# Patient Record
Sex: MALE | Race: WHITE | NOT HISPANIC OR LATINO | Employment: OTHER | URBAN - METROPOLITAN AREA
[De-identification: names, ages, dates, MRNs, and addresses within clinical notes are randomized per-mention and may not be internally consistent; named-entity substitution may affect disease eponyms.]

---

## 2020-03-03 ENCOUNTER — TELEPHONE (OUTPATIENT)
Dept: SCHEDULING | Facility: IMAGING CENTER | Age: 65
End: 2020-03-03

## 2020-06-01 ENCOUNTER — OFFICE VISIT (OUTPATIENT)
Dept: MEDICAL GROUP | Facility: PHYSICIAN GROUP | Age: 65
End: 2020-06-01
Payer: COMMERCIAL

## 2020-06-01 VITALS
HEART RATE: 72 BPM | TEMPERATURE: 97.9 F | WEIGHT: 172.4 LBS | RESPIRATION RATE: 16 BRPM | SYSTOLIC BLOOD PRESSURE: 124 MMHG | DIASTOLIC BLOOD PRESSURE: 80 MMHG | BODY MASS INDEX: 24.14 KG/M2 | HEIGHT: 71 IN | OXYGEN SATURATION: 95 %

## 2020-06-01 DIAGNOSIS — E78.2 MIXED HYPERLIPIDEMIA: ICD-10-CM

## 2020-06-01 DIAGNOSIS — E10.9 TYPE 1 DIABETES MELLITUS WITHOUT COMPLICATION (HCC): ICD-10-CM

## 2020-06-01 DIAGNOSIS — I10 ESSENTIAL HYPERTENSION: ICD-10-CM

## 2020-06-01 DIAGNOSIS — N52.8 OTHER MALE ERECTILE DYSFUNCTION: ICD-10-CM

## 2020-06-01 PROCEDURE — 99204 OFFICE O/P NEW MOD 45 MIN: CPT | Performed by: FAMILY MEDICINE

## 2020-06-01 RX ORDER — FLASH GLUCOSE SENSOR
KIT MISCELLANEOUS
COMMUNITY
End: 2021-05-06 | Stop reason: SDUPTHER

## 2020-06-01 RX ORDER — ASPIRIN 81 MG/1
81 TABLET, CHEWABLE ORAL DAILY
COMMUNITY

## 2020-06-01 RX ORDER — AMLODIPINE BESYLATE 5 MG/1
TABLET ORAL
COMMUNITY
Start: 2020-05-13 | End: 2021-05-06 | Stop reason: SDUPTHER

## 2020-06-01 RX ORDER — INSULIN GLARGINE 100 [IU]/ML
INJECTION, SOLUTION SUBCUTANEOUS
COMMUNITY
Start: 2020-05-13 | End: 2021-05-04 | Stop reason: SDUPTHER

## 2020-06-01 RX ORDER — LORATADINE 10 MG/1
10 TABLET ORAL DAILY
COMMUNITY
End: 2023-07-24

## 2020-06-01 RX ORDER — INSULIN LISPRO 100 [IU]/ML
INJECTION, SOLUTION INTRAVENOUS; SUBCUTANEOUS
Qty: 5 PEN | Refills: 11 | Status: SHIPPED | OUTPATIENT
Start: 2020-06-01 | End: 2020-06-01 | Stop reason: SDUPTHER

## 2020-06-01 RX ORDER — TADALAFIL 20 MG/1
20 TABLET ORAL PRN
Qty: 30 TAB | Refills: 3 | Status: SHIPPED | OUTPATIENT
Start: 2020-06-01 | End: 2020-12-01

## 2020-06-01 RX ORDER — RANITIDINE 150 MG/1
150 TABLET ORAL 2 TIMES DAILY
COMMUNITY
End: 2021-05-04

## 2020-06-01 RX ORDER — INSULIN LISPRO 100 [IU]/ML
INJECTION, SOLUTION INTRAVENOUS; SUBCUTANEOUS
Qty: 15 PEN | Refills: 0 | Status: SHIPPED | OUTPATIENT
Start: 2020-06-01 | End: 2021-04-29 | Stop reason: SDUPTHER

## 2020-06-01 RX ORDER — ATORVASTATIN CALCIUM 10 MG/1
TABLET, FILM COATED ORAL
COMMUNITY
Start: 2020-04-24 | End: 2020-12-01 | Stop reason: SDUPTHER

## 2020-06-01 ASSESSMENT — PATIENT HEALTH QUESTIONNAIRE - PHQ9: CLINICAL INTERPRETATION OF PHQ2 SCORE: 0

## 2020-06-01 NOTE — PROGRESS NOTES
Subjective:     CC:  Diagnoses of Type 1 diabetes mellitus without complication (HCC), Mixed hyperlipidemia, Essential hypertension, and Other male erectile dysfunction were pertinent to this visit.    HISTORY OF THE PRESENT ILLNESS: Patient is a 65 y.o. male. This pleasant patient is here today to establish care and discuss the following problems.   Prior PCP: PCP in [place]     Type 1 diabetes mellitus without complication (HCC)  This is a chronic condition  The patient was diagnosed with this 43 years ago   He is on insulin basaglar 47U qAM  Also on Humalog 10U before breakfast and 55U before dinner.   Last A1c was 7/16/19 at 6.3  Cannot tell if he is hypoglycemic   Has not seen endocrinology    Mixed hyperlipidemia  This is a chronic issue.  The patient has a hx of of this. He is on lipitor 10mg daily.     Essential hypertension  Stable. Monitoring BP at home. Currently taking amlodipine 5mg as directed. Also taking baby aspirin. Denies lightheadedness, vision changes, headache, palpitations or leg swelling.      Other male erectile dysfunction  This is a chronic issue  The patient has had ED on/off over the past year.   Trouble initiating with this.   Requesting medicine today.       Allergies: Patient has no known allergies.    Current Outpatient Medications Ordered in Epic   Medication Sig Dispense Refill   • amLODIPine (NORVASC) 5 MG Tab      • atorvastatin (LIPITOR) 10 MG Tab      • INSULIN GLARGINE 100 UNIT/ML injection PEN      • insulin aspart (NOVOLOG) 100 UNIT/ML Solution Inject  as instructed 3 times a day before meals.     • Continuous Blood Gluc Sensor (FREESTYLE DON SENSOR SYSTEM) Misc by Does not apply route.     • raNITidine (ZANTAC) 150 MG Tab Take 150 mg by mouth 2 times a day.     • aspirin (ASPIRIN 81) 81 MG Chew Tab chewable tablet Take 81 mg by mouth every day.     • loratadine (CLARITIN) 10 MG Tab Take 10 mg by mouth every day.     • insulin lispro (HUMALOG) 100 UNIT/ML Solution  "Pen-injector injection PEN Continue with 10U qAM and 55U qPM 5 PEN 11   • tadalafil (CIALIS) 20 MG tablet Take 1 Tab by mouth as needed for Erectile Dysfunction. 30 Tab 3     No current Epic-ordered facility-administered medications on file.        Past Medical History:   Diagnosis Date   • HTN (hypertension)        History reviewed. No pertinent surgical history.    Social History     Tobacco Use   • Smoking status: Never Smoker   • Smokeless tobacco: Never Used   Substance Use Topics   • Alcohol use: Yes     Alcohol/week: 1.2 oz     Types: 2 Cans of beer per week   • Drug use: Never       Social History     Social History Narrative   • Not on file       Family History   Problem Relation Age of Onset   • No Known Problems Mother    • No Known Problems Father        Health Maintenance: Completed    ROS:   Gen: no fevers/chills, no changes in weight  Eyes: no changes in vision  ENT: no sore throat, no hearing loss, no bloody nose  Pulm: no sob, no cough  CV: no chest pain, no palpitations  GI: no nausea/vomiting, no diarrhea  : no dysuria  MSk: no myalgias  Skin: no rash  Neuro: no headaches, no numbness/tingling      Objective:     Exam: /80 (BP Location: Left arm, Patient Position: Sitting, BP Cuff Size: Adult)   Pulse 72   Temp 36.6 °C (97.9 °F) (Temporal)   Resp 16   Ht 1.803 m (5' 11\")   Wt 78.2 kg (172 lb 6.4 oz)   SpO2 95%  Body mass index is 24.04 kg/m².    General: Normal appearing. No distress.  HENT: Normocephalic. Ears normal shape and contour, canals are clear bilaterally, tympanic membranes are benign, nasal mucosa benign, oropharynx is without erythema, edema or exudates.   Eyes: Conjunctiva clear lids without ptosis, pupils equal and reactive to light accommodation.  Neck: Supple without JVD. Thyroid is not enlarged.  Pulmonary: Clear to ausculation.  Normal effort. No rales, ronchi, or wheezing.  Cardiovascular: Regular rate and rhythm without murmur. Radial pulses are intact and equal " bilaterally.  Abdomen: Soft, nontender, nondistended. Normal bowel sounds. Liver and spleen are not palpable  Neurologic: Moves all extremities  Lymph: No cervical or supraclavicular lymph nodes are palpable  Skin: Warm and dry.  No obvious lesions.  Musculoskeletal: Normal gait. No extremity cyanosis, clubbing, or edema.  Psych: Normal mood and affect. Alert and oriented x3. Judgment and insight is normal.    Assessment & Plan:   65 y.o. male with the following -    1. Type 1 diabetes mellitus without complication (HCC)  This is a chronic issue.  The patient has a history of type 1 diabetes currently on Humalog 10 units in the morning and 55 units at night as managed by previous PCP.  He is also taking Basaglar 47 units in the morning.  Most recent A1c was 6.3 back in July 2019.  I did offer him the opportunity to establish with our diabetic nurse and he will consider this in the near future.  Today he is requesting refills for his medications.  I would like to repeat his A1c today to see if there needs to be changes to his current regimen.  We will also refer to ophthalmology.  Blood pressure is within goal.  The patient is already on statin.  The patient is not on ACE inhibitor.  We will continue to revisit this in the future.  - insulin lispro (HUMALOG) 100 UNIT/ML Solution Pen-injector injection PEN; Continue with 10U qAM and 55U qPM  Dispense: 5 PEN; Refill: 11  - HEMOGLOBIN A1C; Future  - Lipid Profile; Future  - CBC WITHOUT DIFFERENTIAL; Future  - Basic Metabolic Panel; Future  - REFERRAL TO OPHTHALMOLOGY    2. Mixed hyperlipidemia  Is a chronic issue.  The patient has a history of hyperlipidemia currently on atorvastatin 10 mg daily.  The patient is due for labs today.    3. Essential hypertension  This is a chronic issue.  Patient has a history of hypertension currently on amlodipine 5 mg daily.  Blood pressure is within goal.    4. Other male erectile dysfunction  This is a chronic issue.  The patient has  been experiencing erectile dysfunction.  He is requesting a refill for Cialis today.  - tadalafil (CIALIS) 20 MG tablet; Take 1 Tab by mouth as needed for Erectile Dysfunction.  Dispense: 30 Tab; Refill: 3      Return in about 6 months (around 12/1/2020).    Please note that this dictation was created using voice recognition software. I have made every reasonable attempt to correct obvious errors, but I expect that there are errors of grammar and possibly content that I did not discover before finalizing the note.

## 2020-06-01 NOTE — ASSESSMENT & PLAN NOTE
This is a chronic issue  The patient has had ED on/off over the past year.   Trouble initiating with this.   Requesting medicine today.

## 2020-06-01 NOTE — ASSESSMENT & PLAN NOTE
Stable. Monitoring BP at home. Currently taking amlodipine 5mg as directed. Also taking baby aspirin. Denies lightheadedness, vision changes, headache, palpitations or leg swelling.

## 2020-06-01 NOTE — TELEPHONE ENCOUNTER
Received request via: Patient    Was the patient seen in the last year in this department? Yes    Does the patient have an active prescription (recently filled or refills available) for medication(s) requested? No     Patient needs 90 day supply for insurance

## 2020-06-01 NOTE — ASSESSMENT & PLAN NOTE
This is a chronic condition  The patient was diagnosed with this 43 years ago   He is on insulin basaglar 47U qAM  Also on Humalog 10U before breakfast and 55U before dinner.   Last A1c was 7/16/19 at 6.3  Cannot tell if he is hypoglycemic   Has not seen endocrinology

## 2020-06-09 LAB — HBA1C MFR BLD: 6.6 % (ref 0–5.6)

## 2020-09-15 ENCOUNTER — TELEPHONE (OUTPATIENT)
Dept: MEDICAL GROUP | Facility: PHYSICIAN GROUP | Age: 65
End: 2020-09-15

## 2020-09-15 DIAGNOSIS — M54.9 OTHER CHRONIC BACK PAIN: ICD-10-CM

## 2020-09-15 DIAGNOSIS — G89.29 OTHER CHRONIC BACK PAIN: ICD-10-CM

## 2020-09-15 NOTE — TELEPHONE ENCOUNTER
1. Caller Name: Sukumar Tyson                          Call Back Number: [phone] (home)     Patient is requesting a referral to a chiropractor Luis Fernando Voss @ [facility] for back pain.    Please advise

## 2020-09-22 ENCOUNTER — TELEPHONE (OUTPATIENT)
Dept: MEDICAL GROUP | Facility: PHYSICIAN GROUP | Age: 65
End: 2020-09-22

## 2020-09-22 NOTE — TELEPHONE ENCOUNTER
1. Caller Name: Sukumar Tyson                          Call Back Number: [phone] (home)     Patient is upset and want something done about his chiropractor referral. I explained to the patient when it was placed and today several times that it does take 5-7 business days. Patient was upset stating Dr. Awad needs to get faster on referrals I explained that this is a whole other department that handles this. I let patient know he is welcome to come in for an appointment. He does not want an appointment and believes the office is money hungry. Patient will call back in a couple days to get an update

## 2020-12-01 ENCOUNTER — TELEMEDICINE (OUTPATIENT)
Dept: MEDICAL GROUP | Facility: PHYSICIAN GROUP | Age: 65
End: 2020-12-01
Payer: COMMERCIAL

## 2020-12-01 VITALS — WEIGHT: 170 LBS | BODY MASS INDEX: 23.8 KG/M2 | TEMPERATURE: 97.5 F | HEIGHT: 71 IN

## 2020-12-01 DIAGNOSIS — I10 ESSENTIAL HYPERTENSION: ICD-10-CM

## 2020-12-01 DIAGNOSIS — E78.2 MIXED HYPERLIPIDEMIA: ICD-10-CM

## 2020-12-01 DIAGNOSIS — G89.29 CHRONIC LOW BACK PAIN WITHOUT SCIATICA, UNSPECIFIED BACK PAIN LATERALITY: ICD-10-CM

## 2020-12-01 DIAGNOSIS — E10.9 TYPE 1 DIABETES MELLITUS WITHOUT COMPLICATION (HCC): ICD-10-CM

## 2020-12-01 DIAGNOSIS — M54.50 CHRONIC LOW BACK PAIN WITHOUT SCIATICA, UNSPECIFIED BACK PAIN LATERALITY: ICD-10-CM

## 2020-12-01 PROCEDURE — 99214 OFFICE O/P EST MOD 30 MIN: CPT | Mod: 95,CR | Performed by: FAMILY MEDICINE

## 2020-12-01 RX ORDER — ATORVASTATIN CALCIUM 10 MG/1
10 TABLET, FILM COATED ORAL DAILY
Qty: 90 TAB | Refills: 3 | Status: SHIPPED | OUTPATIENT
Start: 2020-12-01 | End: 2021-11-29

## 2020-12-01 RX ORDER — SILDENAFIL 100 MG/1
100 TABLET, FILM COATED ORAL PRN
Qty: 10 TAB | Refills: 3 | Status: SHIPPED | OUTPATIENT
Start: 2020-12-01 | End: 2020-12-01

## 2020-12-01 RX ORDER — SILDENAFIL 100 MG/1
100 TABLET, FILM COATED ORAL PRN
Qty: 30 TAB | Refills: 3 | Status: SHIPPED | OUTPATIENT
Start: 2020-12-01 | End: 2022-01-20

## 2020-12-01 NOTE — PROGRESS NOTES
Virtual Visit: Established Patient   This visit was conducted via Zoom using secure and encrypted videoconferencing technology. The patient was in a private location in the state of [place].    The patient's identity was confirmed and verbal consent was obtained for this virtual visit.    Subjective:   CC:   Chief Complaint   Patient presents with   • Follow-Up       Sukumar Tyson is a 65 y.o. male presenting for evaluation and management of:    #DMT1  This is a chronic issue  Currently on Basaglar 47U qAM and Humalog 10U before breakfast and 55U before dinner  Last A1c was 6.6 in 6/20  FBG has been in the 120s  Not following up with endocrinology   Follows up with ophthalmology     #HTN  Stable. Monitoring BP at home. Currently taking amlodipine 5mg daily as directed. Also taking baby aspirin. Denies lightheadedness, vision changes, headache, palpitations or leg swelling.    #HLD  This is a chronic issue  The patient is on Lipitor 10mg daily  Last lipid panel 6/20 and all wnl.     #Low back pain  This is a chronic issue  Has seen a chiropractor   Requesting referral today    ROS   Denies any recent fevers or chills. No nausea or vomiting. No chest pains or shortness of breath.     No Known Allergies    Current medicines (including changes today)  Current Outpatient Medications   Medication Sig Dispense Refill   • atorvastatin (LIPITOR) 10 MG Tab Take 1 Tab by mouth every day. 90 Tab 3   • sildenafil citrate (VIAGRA) 100 MG tablet Take 1 Tab by mouth as needed for Erectile Dysfunction. 30 Tab 3   • glucagon 1 mg Recon Soln Infuse 1 mg into a venous catheter Once for 1 dose. 1 Each 0   • amLODIPine (NORVASC) 5 MG Tab      • INSULIN GLARGINE 100 UNIT/ML injection PEN      • insulin aspart (NOVOLOG) 100 UNIT/ML Solution Inject  as instructed 3 times a day before meals.     • Continuous Blood Gluc Sensor ([employer] DON SENSOR SYSTEM) Misc by Does not apply route.     • raNITidine (ZANTAC) 150 MG Tab Take 150 mg by  "mouth 2 times a day.     • aspirin (ASPIRIN 81) 81 MG Chew Tab chewable tablet Take 81 mg by mouth every day.     • loratadine (CLARITIN) 10 MG Tab Take 10 mg by mouth every day.     • insulin lispro (HUMALOG) 100 UNIT/ML Solution Pen-injector injection PEN Continue with 10U qAM and 55U qPM 15 PEN 0     No current facility-administered medications for this visit.        Patient Active Problem List    Diagnosis Date Noted   • Chronic low back pain without sciatica 12/01/2020   • Type 1 diabetes mellitus without complication (HCC) 06/01/2020   • Mixed hyperlipidemia 06/01/2020   • Essential hypertension 06/01/2020   • Other male erectile dysfunction 06/01/2020       Family History   Problem Relation Age of Onset   • No Known Problems Mother    • No Known Problems Father        He  has a past medical history of HTN (hypertension).  He  has no past surgical history on file.       Objective:   Temp 36.4 °C (97.5 °F) (Oral) Comment: pt reported  Ht 1.803 m (5' 11\") Comment: pt reported  Wt 77.1 kg (170 lb) Comment: pt reported  BMI 23.71 kg/m²     Physical Exam:  Constitutional: Alert, no distress, well-groomed.  Skin: No rashes in visible areas.  Eye: Round. Conjunctiva clear, lids normal. No icterus.   ENMT: Lips pink without lesions, good dentition, moist mucous membranes. Phonation normal.  Neck: No masses, no thyromegaly. Moves freely without pain.  Respiratory: Unlabored respiratory effort, no cough or audible wheeze  Psych: Alert and oriented x3, normal affect and mood.       Assessment and Plan:   The following treatment plan was discussed:     1. Type 1 diabetes mellitus without complication (HCC)  Last A1c - 6.6 6/20. Due for repeat  Next A1c due - due in 1 month   Regimen - basaglar 47U qam, humalog 10U before breakfast and 55U before dinner   BP at goal <140/90  Last DM foot exam - w/ the last 12 months   Last ophthalmology visit - 7/20  Follow up in - 6 mo    - HEMOGLOBIN A1C; Future  - atorvastatin " (LIPITOR) 10 MG Tab; Take 1 Tab by mouth every day.  Dispense: 90 Tab; Refill: 3  - glucagon 1 mg Recon Soln; Infuse 1 mg into a venous catheter Once for 1 dose.  Dispense: 1 Each; Refill: 0    2. Essential hypertension  This is a chronic issue, stable. Continue with current regimen  - atorvastatin (LIPITOR) 10 MG Tab; Take 1 Tab by mouth every day.  Dispense: 90 Tab; Refill: 3    3. Mixed hyperlipidemia  This is a chronic issue. Due for repeat lipid panel 6/21.     4. Chronic low back pain without sciatica, unspecified back pain laterality  This is a new problem. Has had lumbar back pain w/o sciatica. Failed chiropractor therapy. Will refer to PT.   - REFERRAL TO PHYSICAL THERAPY    Other orders  - sildenafil citrate (VIAGRA) 100 MG tablet; Take 1 Tab by mouth as needed for Erectile Dysfunction.  Dispense: 30 Tab; Refill: 3        Follow-up: Return in about 6 months (around 6/1/2021).

## 2021-01-04 ENCOUNTER — PHYSICAL THERAPY (OUTPATIENT)
Dept: PHYSICAL THERAPY | Facility: REHABILITATION | Age: 66
End: 2021-01-04
Attending: FAMILY MEDICINE
Payer: COMMERCIAL

## 2021-01-04 DIAGNOSIS — M54.50 CHRONIC LOW BACK PAIN WITHOUT SCIATICA, UNSPECIFIED BACK PAIN LATERALITY: ICD-10-CM

## 2021-01-04 DIAGNOSIS — G89.29 CHRONIC LOW BACK PAIN WITHOUT SCIATICA, UNSPECIFIED BACK PAIN LATERALITY: ICD-10-CM

## 2021-01-04 PROCEDURE — 97162 PT EVAL MOD COMPLEX 30 MIN: CPT

## 2021-01-04 PROCEDURE — 97535 SELF CARE MNGMENT TRAINING: CPT

## 2021-01-04 PROCEDURE — 97110 THERAPEUTIC EXERCISES: CPT

## 2021-01-04 ASSESSMENT — ENCOUNTER SYMPTOMS
PAIN SCALE AT LOWEST: 3
PAIN SCALE AT HIGHEST: 8
PAIN SCALE: 3

## 2021-01-04 NOTE — OP THERAPY EVALUATION
Outpatient Physical Therapy  INITIAL EVALUATION    [facility]  [address], [address]  [place] [address]  Phone:  [phone]    Date of Evaluation: 2021    Patient: Sukumar Tyson  YOB: 1955  MRN: [id number]     Referring Provider: Soren Awad M.D.  [address]  [place] [address]   Referring Diagnosis Chronic low back pain without sciatica, unspecified back pain laterality [M54.5, G89.29]     Time Calculation                   Chief Complaint: No chief complaint on file.    Visit Diagnoses     ICD-10-CM   1. Chronic low back pain without sciatica, unspecified back pain laterality  M54.5    G89.29         Subjective:   History of Present Illness:     Mechanism of injury:  Lower left back pain that has been going on for years. Feels constant symptoms. Made worse with sitting and forward bending. Denies any specific injury. Walking helps reduce symptoms. Dressing every morning, to put on socks and shoes provokes low back symptoms. Denies radicular symptoms, but does recall a past episode of sciatica in 2020 where symptoms travelled down lateral left leg. Recalls, 7-8 pain upon standing after sitting for 10-15 minutes.     Occupation: Semi-retired   Hobbies: skiing, walking, stretches, golfing  Pain:     Current pain rating:  3    At best pain rating:  3    At worst pain ratin    Location:  L. lumbar     Progression:  Stable  Treatments:     Previous treatment:  Chiropractic    Treatment Comments:  Has had 4 chiro visits in past 6 weeks, don't feel like it's helping  Takes 2 ibuprofen and 2 tylenol before activity   Patient Goals:     Patient goals for therapy:  Decreased pain      Past Medical History:   Diagnosis Date   • HTN (hypertension)      No past surgical history on file.  Social History     Tobacco Use   • Smoking status: Never Smoker   • Smokeless tobacco: Never Used   Substance Use Topics   • Alcohol use: Yes      Alcohol/week: 1.2 oz     Types: 2 Cans of beer per week     Family and Occupational History     Socioeconomic History   • Marital status:      Spouse name: Not on file   • Number of children: Not on file   • Years of education: Not on file   • Highest education level: Not on file   Occupational History   • Not on file       Objective     Hip Screen   Hip range of motion within functional limits with the following exceptions: Pain with active and passive hip flex on L. In sitting but not supine   Stiff but not painful R. Hip IR  Hip strength within functional limits    Tenderness     Left Hip   Tenderness in the PSIS, sacroiliac joint and sacrum.      Active Range of Motion     Lumbar   Flexion: decreased  Extension: within functional limits  Left rotation: decreased  Right rotation: within functional limits    Additional Active Range of Motion Details  ROS LR and flexion     Passive Range of Motion     Lumbar   Flexion: within functional limits  Extension: decreased  Left rotation: decreased  Right rotation: decreased    Additional Passive Range of Motion Details  Pain PROM flexion and caudal R. And L. Rotation     Joint Play   Spine     Central PA [place]        L4: hypomobile with grade 2       L5: hypomobile with grade 2       S1: hypomobile with grade 2            Therapeutic Exercises (CPT 77151):     1. HS stretch supine w/ strap , 2 min B, HEP w/ picture    2. Piriformis stretch , 1 min , HEP w/ picture    3. Bridging , 1 x 15 x 5'', HEP w/ picture    4. Ball caudal rotation , 1 min , HEP for lumbar decompression when Symptomatic     Therapeutic Treatments and Modalities:     1. Mechanical Traction (CPT 60632), Lumbar, 75/35# and 60/20t w/ MHP     2. Functional Training, Self Care (CPT 10485), HEP and education on sitting posture     Time-based treatments/modalities:           Assessment, Response and Plan:   Impairments: lacks appropriate home exercise program, limited mobility and pain with function     Assessment details:  Patient would benefit from skilled physical therapy to address low back pain by working on strength and conditioning, AROM/PROM, manual therapy techniques, postural positioning, activity tolerance and functional activity   Barriers to therapy:  None  Prognosis: good    Goals:   Short Term Goals:   1) Indep with HEP  2) 50% less pain in forward trunk flexion while seated  Short term goal time span:  2-4 weeks      Long Term Goals:    1) Progression/regression advancing HEP  2) Able to don/doff socks shoes with 1-2 levels of less pain on VAS  Long term goal time span:  4-6 weeks    Plan:   Therapy options:  Physical therapy treatment to continue  Planned therapy interventions:  Therapeutic Exercise (CPT 81300), Therapeutic Activities (CPT 44110), Functional Training, Self Care (CPT 11250) and Neuromuscular Re-education (CPT 89976)  Frequency:  2x week  Duration in weeks:  6  Duration in visits:  12  Discussed with:  Patient      Functional Assessment Used        Referring provider co-signature:  I have reviewed this plan of care and my co-signature certifies the need for services.    Certification Period: 01/04/2021 to  03/01/21    Physician Signature: ________________________________ Date: ______________

## 2021-01-11 ENCOUNTER — PHYSICAL THERAPY (OUTPATIENT)
Dept: PHYSICAL THERAPY | Facility: REHABILITATION | Age: 66
End: 2021-01-11
Attending: FAMILY MEDICINE
Payer: COMMERCIAL

## 2021-01-11 DIAGNOSIS — G89.29 CHRONIC LOW BACK PAIN WITHOUT SCIATICA, UNSPECIFIED BACK PAIN LATERALITY: ICD-10-CM

## 2021-01-11 DIAGNOSIS — M54.50 CHRONIC LOW BACK PAIN WITHOUT SCIATICA, UNSPECIFIED BACK PAIN LATERALITY: ICD-10-CM

## 2021-01-11 PROCEDURE — 97110 THERAPEUTIC EXERCISES: CPT

## 2021-01-11 PROCEDURE — 97535 SELF CARE MNGMENT TRAINING: CPT

## 2021-01-11 NOTE — OP THERAPY DAILY TREATMENT
"  Outpatient Physical Therapy  DAILY TREATMENT     [facility] [place] [address] [facility], [address]  [place] [address]  Phone:  [phone]    Date: 01/11/2021    Patient: Sukumar Tyson  YOB: 1955  MRN: [id number]     Time Calculation    Start time: 0900  Stop time: 0930 Time Calculation (min): 30 minutes         Chief Complaint: Back Problem    Visit #: 2    SUBJECTIVE:  No changes in symptoms. The constant pain I feel in low back feels still present.     OBJECTIVE:  Current objective measures:     Standing lumbar flexion \"pain\" in posterior legs posterior not back  qped flexion, normal     Normal rotation mobility    PIVM: stiff for lumbar extension          Therapeutic Exercises (CPT 11787):     1. HS stretch supine w/ strap , 2 min B, HEP w/ picture    2. Piriformis stretch , 1 min , HEP w/ picture    3. Bridging , 1 x 15 x 5'', HEP w/ picture    4. Ball caudal rotation , HEP, HEP for lumbar decompression when Symptomatic     5. Ball wall squat, 1 x 20 , emphasis on neutral lumbar spine     6. ASLR , 2 x 12 B     Therapeutic Treatments and Modalities:     1. Mechanical Traction (CPT 06838), Lumbar, 75/35# and 60/20t w/ MHP     2. Functional Training, Self Care (CPT 25813), HEP and education on sitting posture     Time-based treatments/modalities:    Physical Therapy Timed Treatment Charges  Functional training, self care minutes (CPT 48562): 10 minutes  Therapeutic exercise minutes (CPT 43085): 20 minutes      ASSESSMENT:   Response to treatment: patient has very restricted mobility in his hamstrings and glutes and has pain with forward flexion as his chief complaint. Focus of PT has been improving lower extremity soft tissue mobility. Tolerated tx well.     PLAN/RECOMMENDATIONS:   Plan for treatment: therapy treatment to continue next visit.  Planned interventions for next visit: continue with current treatment.       "

## 2021-01-18 ENCOUNTER — PHYSICAL THERAPY (OUTPATIENT)
Dept: PHYSICAL THERAPY | Facility: REHABILITATION | Age: 66
End: 2021-01-18
Attending: FAMILY MEDICINE
Payer: COMMERCIAL

## 2021-01-18 DIAGNOSIS — G89.29 CHRONIC LOW BACK PAIN WITHOUT SCIATICA, UNSPECIFIED BACK PAIN LATERALITY: ICD-10-CM

## 2021-01-18 DIAGNOSIS — M54.50 CHRONIC LOW BACK PAIN WITHOUT SCIATICA, UNSPECIFIED BACK PAIN LATERALITY: ICD-10-CM

## 2021-01-18 PROCEDURE — 97110 THERAPEUTIC EXERCISES: CPT

## 2021-01-18 PROCEDURE — 97140 MANUAL THERAPY 1/> REGIONS: CPT

## 2021-01-18 NOTE — OP THERAPY DAILY TREATMENT
Outpatient Physical Therapy  DAILY TREATMENT     [facility] [address] [facility], [address]  [place] [address]  Phone:  [phone]    Date: 01/18/2021    Patient: Sukumar Tyson  YOB: 1955  MRN: [id number]     Time Calculation    Start time: 0830  Stop time: 0900 Time Calculation (min): 30 minutes         Chief Complaint: Back Problem    Visit #: 3    SUBJECTIVE:  The patient reports difficulty getting up from seated positions and turning to the (L) side standing. No changes in frequency or intensity of symptoms as of lately. Pain 2-3/10     OBJECTIVE:  Current objective measures:     increase mobility to lumbar; decrease tightness to quadratus piriformis       Therapeutic Exercises (CPT 79734):     1. HS stretch supine w/ strap , 2 min B, HEP w/ picture    2. Piriformis stretch , 1 min , HEP w/ picture    3. Bridging , 1 x 15 x 5'', HEP w/ picture    4. Ball caudal rotation , HEP, HEP for lumbar decompression when Symptomatic     5. Ball wall squat, 1 x 20 , emphasis on neutral lumbar spine     6. ASLR , 2 x 12 B     7. Prone over ball trunk flexion, 2 minutes    Therapeutic Treatments and Modalities:     1. Mechanical Traction (CPT 67187), Lumbar, 75/35# and 60/20t w/ MHP     2. Functional Training, Self Care (CPT 51945), HEP and education on sitting posture     3. Manual Therapy (CPT 66613), STM to the (L) quadratus/ piriformis     Time-based treatments/modalities:    Physical Therapy Timed Treatment Charges  Manual therapy minutes (CPT 19880): 15 minutes  Therapeutic exercise minutes (CPT 45785): 15 minutes      ASSESSMENT:   Response to treatment:   Patient responded well with modification to piriformis/gluteal stretch with positional change in supine; self care directions for lumbar traction at home using theraball in prone , seated triceps extension lifting weight of body intermittently with tolerable outcomes to alleviate pressure to lumbar. Patient given HEP  handout with  Exercises.     PLAN/RECOMMENDATIONS:   Plan for treatment: therapy treatment to continue next visit.  Planned interventions for next visit: continue with current treatment.

## 2021-02-01 ENCOUNTER — APPOINTMENT (OUTPATIENT)
Dept: PHYSICAL THERAPY | Facility: REHABILITATION | Age: 66
End: 2021-02-01
Attending: FAMILY MEDICINE
Payer: COMMERCIAL

## 2021-02-09 NOTE — OP THERAPY DAILY TREATMENT
Outpatient Physical Therapy  DAILY TREATMENT     [facility] Outpatient Physical Therapy [place] [address] [employer] [facility], [address]  [place] [address]  Phone:  [phone]    Date: 02/10/2021    Patient: Sukumar Tyson  YOB: 1955  MRN: [id number]     Time Calculation    Start time: 0800  Stop time: 0830 Time Calculation (min): 30 minutes         Chief Complaint: Back Problem    Visit #: 4    SUBJECTIVE:  The patient reports continued discomfort/pain initially when getting up from bending or seated positions    OBJECTIVE:  Current objective measures:       Decrease tightness to the lumbar paraspinals; increase strength and mobility in (L) hip      Therapeutic Exercises (CPT 60855):     1. HS stretch supine w/ strap , 2 min B    2. Piriformis stretch , 1 min     3. Bridging , 1 x 15 x 5''    4. Ball caudal rotation , HEP    5. Ball wall squat, 1 x 20 , emphasis on neutral lumbar spine     6. Prone table (L) hip extension, 2 x15 reps    7. (L) hip flexion, abduction with green cord, 2 x15 reps    Therapeutic Treatments and Modalities:     1. Mechanical Traction (CPT 37765), Lumbar, 75/35# and 60/20t w/ MHP     2. Functional Training, Self Care (CPT 67457), HEP and education on sitting posture     3. Manual Therapy (CPT 16324), STM to the (L) quadratus/ piriformis     Time-based treatments/modalities:    Physical Therapy Timed Treatment Charges  Manual therapy minutes (CPT 38358): 10 minutes  Therapeutic exercise minutes (CPT 82764): 20 minutes      ASSESSMENT:   Response to treatment:   Performed prone on table (L) hip extension; patient completed strict movement of exercise with lumbar and (R) gluteal fatigue response; less discomfort getting up from seated position. Patient was given HEP reference for newly introduced exercises and acknowledged.     PLAN/RECOMMENDATIONS:   Plan for treatment: therapy treatment to continue next visit.  Planned interventions for next visit: continue with current treatment.

## 2021-02-10 ENCOUNTER — PHYSICAL THERAPY (OUTPATIENT)
Dept: PHYSICAL THERAPY | Facility: REHABILITATION | Age: 66
End: 2021-02-10
Attending: FAMILY MEDICINE
Payer: COMMERCIAL

## 2021-02-10 DIAGNOSIS — G89.29 CHRONIC LOW BACK PAIN WITHOUT SCIATICA, UNSPECIFIED BACK PAIN LATERALITY: ICD-10-CM

## 2021-02-10 DIAGNOSIS — M54.50 CHRONIC LOW BACK PAIN WITHOUT SCIATICA, UNSPECIFIED BACK PAIN LATERALITY: ICD-10-CM

## 2021-02-10 PROCEDURE — 97140 MANUAL THERAPY 1/> REGIONS: CPT

## 2021-02-10 PROCEDURE — 97110 THERAPEUTIC EXERCISES: CPT

## 2021-03-15 DIAGNOSIS — Z23 NEED FOR VACCINATION: ICD-10-CM

## 2021-04-29 DIAGNOSIS — E10.9 TYPE 1 DIABETES MELLITUS WITHOUT COMPLICATION (HCC): ICD-10-CM

## 2021-04-29 RX ORDER — INSULIN LISPRO 100 [IU]/ML
INJECTION, SOLUTION INTRAVENOUS; SUBCUTANEOUS
Qty: 15 EACH | Refills: 3 | Status: SHIPPED | OUTPATIENT
Start: 2021-04-29 | End: 2021-05-04 | Stop reason: SDUPTHER

## 2021-05-04 ENCOUNTER — OFFICE VISIT (OUTPATIENT)
Dept: MEDICAL GROUP | Facility: PHYSICIAN GROUP | Age: 66
End: 2021-05-04
Payer: COMMERCIAL

## 2021-05-04 ENCOUNTER — PATIENT MESSAGE (OUTPATIENT)
Dept: MEDICAL GROUP | Facility: PHYSICIAN GROUP | Age: 66
End: 2021-05-04

## 2021-05-04 VITALS
WEIGHT: 166.6 LBS | OXYGEN SATURATION: 96 % | HEIGHT: 71 IN | DIASTOLIC BLOOD PRESSURE: 61 MMHG | HEART RATE: 69 BPM | BODY MASS INDEX: 23.32 KG/M2 | SYSTOLIC BLOOD PRESSURE: 140 MMHG | RESPIRATION RATE: 16 BRPM | TEMPERATURE: 97.2 F

## 2021-05-04 DIAGNOSIS — Z12.12 SCREENING FOR COLORECTAL CANCER: ICD-10-CM

## 2021-05-04 DIAGNOSIS — Z12.5 SCREENING FOR MALIGNANT NEOPLASM OF PROSTATE: ICD-10-CM

## 2021-05-04 DIAGNOSIS — E10.9 TYPE 1 DIABETES MELLITUS WITHOUT COMPLICATION (HCC): ICD-10-CM

## 2021-05-04 DIAGNOSIS — Z00.00 WELL ADULT EXAM: ICD-10-CM

## 2021-05-04 DIAGNOSIS — Z12.11 SCREENING FOR COLORECTAL CANCER: ICD-10-CM

## 2021-05-04 PROCEDURE — 99396 PREV VISIT EST AGE 40-64: CPT | Performed by: FAMILY MEDICINE

## 2021-05-04 RX ORDER — INSULIN LISPRO 100 [IU]/ML
INJECTION, SOLUTION INTRAVENOUS; SUBCUTANEOUS
Qty: 15 EACH | Refills: 3 | Status: SHIPPED | OUTPATIENT
Start: 2021-05-04 | End: 2021-05-06 | Stop reason: SDUPTHER

## 2021-05-04 RX ORDER — SODIUM PHOSPHATE,MONO-DIBASIC 19G-7G/118
500 ENEMA (ML) RECTAL
COMMUNITY

## 2021-05-04 RX ORDER — INSULIN GLARGINE 100 [IU]/ML
47 INJECTION, SOLUTION SUBCUTANEOUS EVERY EVENING
Qty: 15 EACH | Refills: 3 | Status: SHIPPED
Start: 2021-05-04 | End: 2021-12-28

## 2021-05-04 RX ORDER — INSULIN GLARGINE 100 [IU]/ML
47 INJECTION, SOLUTION SUBCUTANEOUS EVERY EVENING
Qty: 15 EACH | Refills: 3 | Status: SHIPPED
Start: 2021-05-04 | End: 2021-05-04 | Stop reason: SDUPTHER

## 2021-05-04 RX ORDER — MULTIVITAMIN WITH FOLIC ACID 400 MCG
1 TABLET ORAL DAILY
COMMUNITY

## 2021-05-04 ASSESSMENT — PATIENT HEALTH QUESTIONNAIRE - PHQ9: CLINICAL INTERPRETATION OF PHQ2 SCORE: 0

## 2021-05-04 NOTE — PROGRESS NOTES
Subjective:     CC:   Chief Complaint   Patient presents with   • Annual Exam       HPI:   Sukumar Tyson is a 66 y.o. male who presents for an annual exam. He is feeling well and has no complaints.    Health Maintenance  Diet: well controlled and balanced   Exercise: keeping active and walks daily. Loves to ski.   Substance Abuse: none   Safe in relationship.   Seat belts, bike helmet, gun safety discussed.  Sun protection used.    Cancer screening  Colorectal Cancer Screening: last colonoscopy was 5 years ago.     Prostate Cancer Screening/PSA: requested     Infectious disease screening/Immunizations  --HIV Screening: Declines   --Hepatitis C Screening: neg   --Immunizations:    UTD  He  has a past medical history of HTN (hypertension).  He  has no past surgical history on file.  Family History   Problem Relation Age of Onset   • No Known Problems Mother    • No Known Problems Father      Social History     Tobacco Use   • Smoking status: Never Smoker   • Smokeless tobacco: Never Used   Substance Use Topics   • Alcohol use: Yes     Alcohol/week: 1.2 oz     Types: 2 Cans of beer per week   • Drug use: Never       Patient Active Problem List    Diagnosis Date Noted   • Chronic low back pain without sciatica 12/01/2020   • Type 1 diabetes mellitus without complication (HCC) 06/01/2020   • Mixed hyperlipidemia 06/01/2020   • Essential hypertension 06/01/2020   • Other male erectile dysfunction 06/01/2020       Current Outpatient Medications   Medication Sig Dispense Refill   • glucosamine Sulfate 500 MG Cap Take 500 mg by mouth 3 times a day with meals.     • insulin lispro (HUMALOG) 100 UNIT/ML Solution Pen-injector injection PEN Continue with 10U qAM and 55U qPM 15 Each 3   • atorvastatin (LIPITOR) 10 MG Tab Take 1 Tab by mouth every day. 90 Tab 3   • sildenafil citrate (VIAGRA) 100 MG tablet Take 1 Tab by mouth as needed for Erectile Dysfunction. 30 Tab 3   • amLODIPine (NORVASC) 5 MG Tab      • INSULIN  "GLARGINE 100 UNIT/ML injection PEN      • Continuous Blood Gluc Sensor (FREESTYLE DON SENSOR SYSTEM) Misc by Does not apply route.     • aspirin (ASPIRIN 81) 81 MG Chew Tab chewable tablet Take 81 mg by mouth every day.     • loratadine (CLARITIN) 10 MG Tab Take 10 mg by mouth every day.     • Multiple Vitamin (DAILY-JEANNA) Tab Take 1 tablet by mouth every day.       No current facility-administered medications for this visit.    (including changes today)  Allergies: Patient has no known allergies.    Review of Systems   Constitutional: Negative for fever, chills and malaise/fatigue.   HENT: Negative for congestion.    Eyes: Negative for pain.   Respiratory: Negative for cough and shortness of breath.    Cardiovascular: Negative for leg swelling.   Gastrointestinal: Negative for nausea, vomiting, abdominal pain and diarrhea.   Genitourinary: Negative for dysuria and hematuria.   Skin: Negative for rash.   Neurological: Negative for dizziness, focal weakness and headaches.   Endo/Heme/Allergies: Does not bruise/bleed easily.   Psychiatric/Behavioral: Negative for depression.  The patient is not nervous/anxious.      Objective:     /61 (BP Location: Left arm, Patient Position: Sitting, BP Cuff Size: Adult)   Pulse 69   Temp 36.2 °C (97.2 °F) (Temporal)   Resp 16   Ht 1.803 m (5' 11\")   Wt 75.6 kg (166 lb 9.6 oz)   SpO2 96%   BMI 23.24 kg/m²   Body mass index is 23.24 kg/m².  Wt Readings from Last 4 Encounters:   05/04/21 75.6 kg (166 lb 9.6 oz)   12/01/20 77.1 kg (170 lb)   06/01/20 78.2 kg (172 lb 6.4 oz)       Physical Exam:  Constitutional: Well-developed and well-nourished. Not diaphoretic. No distress.   Skin: Skin is warm and dry. No rash noted.  Head: Atraumatic without lesions.  Eyes: Conjunctivae and extraocular motions are normal. Pupils are equal, round, and reactive to light. No scleral icterus.   Ears:  External ears unremarkable. Tympanic membranes clear and intact.  Nose: Nares patent. " Septum midline. Turbinates without erythema nor edema. No discharge.   Mouth/Throat: Dentition is normal. Tongue normal. Oropharynx is clear and moist. Posterior pharynx without erythema or exudates.  Neck: Supple, trachea midline. Normal range of motion. No thyromegaly present. No lymphadenopathy--cervical or supraclavicular.  Cardiovascular: Regular rate and rhythm, S1 and S2 without murmur, rubs, or gallops.    Lungs: Effort normal. Clear to auscultation throughout. No adventitious sounds. No CVA tenderness.  Abdomen: Soft, non tender, and without distention. Active bowel sounds in all four quadrants. No rebound, guarding, masses or HSM.  Extremities: No cyanosis, clubbing, erythema, nor edema. Distal pulses intact and symmetric.   Musculoskeletal: All major joints AROM full in all directions without pain.  Neurological: Alert and oriented x 3.  No cranial nerve deficit. 5/5 myotomes. Sensation intact.   Psychiatric:  Behavior, mood, and affect are appropriate.      Assessment and Plan:     1. Well adult exam  CBC WITHOUT DIFFERENTIAL    Comp Metabolic Panel    HEMOGLOBIN A1C    Lipid Profile   2. Screening for colorectal cancer  REFERRAL TO GI FOR COLONOSCOPY   3. Screening for malignant neoplasm of prostate  PROSTATE SPECIFIC AG SCREENING       HCM: Completed.  Labs per orders.  Counseling about diet, supplements, exercise, skin care and safe sex.    Follow-up: Return in about 6 months (around 11/4/2021).

## 2021-05-06 RX ORDER — AMLODIPINE BESYLATE 5 MG/1
5 TABLET ORAL DAILY
Qty: 90 TABLET | Refills: 3 | Status: SHIPPED | OUTPATIENT
Start: 2021-05-06 | End: 2021-08-04

## 2021-05-06 RX ORDER — INSULIN LISPRO 100 [IU]/ML
INJECTION, SOLUTION INTRAVENOUS; SUBCUTANEOUS
Qty: 15 EACH | Refills: 3 | Status: SHIPPED | OUTPATIENT
Start: 2021-05-06 | End: 2022-01-20

## 2021-05-06 RX ORDER — FLASH GLUCOSE SENSOR
KIT MISCELLANEOUS
Qty: 6 EACH | Refills: 3 | Status: SHIPPED | OUTPATIENT
Start: 2021-05-06 | End: 2024-03-06

## 2021-05-11 LAB — HBA1C MFR BLD: 6.2 % (ref 0–5.6)

## 2021-09-20 ENCOUNTER — TELEPHONE (OUTPATIENT)
Dept: MEDICAL GROUP | Facility: PHYSICIAN GROUP | Age: 66
End: 2021-09-20

## 2021-09-20 VITALS — SYSTOLIC BLOOD PRESSURE: 132 MMHG | DIASTOLIC BLOOD PRESSURE: 79 MMHG

## 2021-09-20 NOTE — TELEPHONE ENCOUNTER
9/10/21 requested home blood pressure reading from patient via [facility], patient responded with same, readings within normal range, see flowsheets.

## 2021-10-11 ENCOUNTER — TELEPHONE (OUTPATIENT)
Dept: MEDICAL GROUP | Facility: PHYSICIAN GROUP | Age: 66
End: 2021-10-11

## 2021-10-11 NOTE — TELEPHONE ENCOUNTER
Patient paged on call physician on 10/10/2021 at 12:30 PM. He reported positive home Covid test. His symptoms started last Friday. His wife tested positive over the weekend prompting him to use a home test. He reports vaccination with Moderna. Symptoms include Tmax 100.9 F and dry cough, he feels like he has a cold. Discussed arranging for nasal swab in clinic and reasons for additional evaluation. He has a pulse ox at home, has not gone below 97%. He feels comfortable using OTC medicines to treat symptoms and reaching out if he would worsen. I will make his primary physician aware so their staff can continue to check in with him. He was appreciative of the discussion. He declines monoclonal antibody infusion.

## 2021-11-01 ENCOUNTER — APPOINTMENT (OUTPATIENT)
Dept: MEDICAL GROUP | Facility: PHYSICIAN GROUP | Age: 66
End: 2021-11-01

## 2021-11-29 DIAGNOSIS — I10 ESSENTIAL HYPERTENSION: ICD-10-CM

## 2021-11-29 DIAGNOSIS — E10.9 TYPE 1 DIABETES MELLITUS WITHOUT COMPLICATION (HCC): ICD-10-CM

## 2021-11-29 RX ORDER — ATORVASTATIN CALCIUM 10 MG/1
TABLET, FILM COATED ORAL
Qty: 90 TABLET | Refills: 3 | Status: SHIPPED | OUTPATIENT
Start: 2021-11-29 | End: 2022-10-13 | Stop reason: SDUPTHER

## 2022-01-20 ENCOUNTER — HOSPITAL ENCOUNTER (EMERGENCY)
Facility: MEDICAL CENTER | Age: 67
End: 2022-01-20
Attending: EMERGENCY MEDICINE
Payer: COMMERCIAL

## 2022-01-20 ENCOUNTER — APPOINTMENT (OUTPATIENT)
Dept: RADIOLOGY | Facility: MEDICAL CENTER | Age: 67
End: 2022-01-20
Attending: EMERGENCY MEDICINE
Payer: COMMERCIAL

## 2022-01-20 ENCOUNTER — OFFICE VISIT (OUTPATIENT)
Dept: MEDICAL GROUP | Facility: PHYSICIAN GROUP | Age: 67
End: 2022-01-20
Payer: COMMERCIAL

## 2022-01-20 VITALS
OXYGEN SATURATION: 94 % | BODY MASS INDEX: 22.9 KG/M2 | SYSTOLIC BLOOD PRESSURE: 128 MMHG | TEMPERATURE: 98.6 F | HEIGHT: 71 IN | WEIGHT: 163.6 LBS | HEART RATE: 57 BPM | DIASTOLIC BLOOD PRESSURE: 62 MMHG

## 2022-01-20 VITALS
DIASTOLIC BLOOD PRESSURE: 125 MMHG | WEIGHT: 166.01 LBS | SYSTOLIC BLOOD PRESSURE: 187 MMHG | RESPIRATION RATE: 18 BRPM | HEART RATE: 59 BPM | BODY MASS INDEX: 23.24 KG/M2 | HEIGHT: 71 IN | TEMPERATURE: 96.9 F | OXYGEN SATURATION: 96 %

## 2022-01-20 DIAGNOSIS — I10 HYPERTENSION, UNSPECIFIED TYPE: ICD-10-CM

## 2022-01-20 DIAGNOSIS — R07.89 LEFT CHEST PRESSURE: ICD-10-CM

## 2022-01-20 DIAGNOSIS — K21.9 GASTROESOPHAGEAL REFLUX DISEASE WITHOUT ESOPHAGITIS: ICD-10-CM

## 2022-01-20 DIAGNOSIS — R07.89 OTHER CHEST PAIN: ICD-10-CM

## 2022-01-20 DIAGNOSIS — Z12.12 SCREENING FOR COLORECTAL CANCER: ICD-10-CM

## 2022-01-20 DIAGNOSIS — E10.9 TYPE 1 DIABETES MELLITUS WITHOUT COMPLICATION (HCC): ICD-10-CM

## 2022-01-20 DIAGNOSIS — Z12.11 SCREENING FOR COLORECTAL CANCER: ICD-10-CM

## 2022-01-20 DIAGNOSIS — N52.8 OTHER MALE ERECTILE DYSFUNCTION: ICD-10-CM

## 2022-01-20 DIAGNOSIS — R06.02 SHORTNESS OF BREATH: ICD-10-CM

## 2022-01-20 LAB
ALBUMIN SERPL BCP-MCNC: 4.6 G/DL (ref 3.2–4.9)
ALBUMIN/GLOB SERPL: 1.7 G/DL
ALP SERPL-CCNC: 68 U/L (ref 30–99)
ALT SERPL-CCNC: 29 U/L (ref 2–50)
ANION GAP SERPL CALC-SCNC: 11 MMOL/L (ref 7–16)
AST SERPL-CCNC: 28 U/L (ref 12–45)
BASOPHILS # BLD AUTO: 0.4 % (ref 0–1.8)
BASOPHILS # BLD: 0.03 K/UL (ref 0–0.12)
BILIRUB SERPL-MCNC: 0.6 MG/DL (ref 0.1–1.5)
BUN SERPL-MCNC: 23 MG/DL (ref 8–22)
CALCIUM SERPL-MCNC: 9.4 MG/DL (ref 8.5–10.5)
CHLORIDE SERPL-SCNC: 108 MMOL/L (ref 96–112)
CO2 SERPL-SCNC: 25 MMOL/L (ref 20–33)
CREAT SERPL-MCNC: 0.91 MG/DL (ref 0.5–1.4)
EKG IMPRESSION: NORMAL
EOSINOPHIL # BLD AUTO: 0.14 K/UL (ref 0–0.51)
EOSINOPHIL NFR BLD: 1.9 % (ref 0–6.9)
ERYTHROCYTE [DISTWIDTH] IN BLOOD BY AUTOMATED COUNT: 43.8 FL (ref 35.9–50)
GLOBULIN SER CALC-MCNC: 2.7 G/DL (ref 1.9–3.5)
GLUCOSE SERPL-MCNC: 57 MG/DL (ref 65–99)
HCT VFR BLD AUTO: 45.8 % (ref 42–52)
HGB BLD-MCNC: 15.4 G/DL (ref 14–18)
IMM GRANULOCYTES # BLD AUTO: 0.02 K/UL (ref 0–0.11)
IMM GRANULOCYTES NFR BLD AUTO: 0.3 % (ref 0–0.9)
LYMPHOCYTES # BLD AUTO: 2.77 K/UL (ref 1–4.8)
LYMPHOCYTES NFR BLD: 37.3 % (ref 22–41)
MCH RBC QN AUTO: 31.5 PG (ref 27–33)
MCHC RBC AUTO-ENTMCNC: 33.6 G/DL (ref 33.7–35.3)
MCV RBC AUTO: 93.7 FL (ref 81.4–97.8)
MONOCYTES # BLD AUTO: 0.6 K/UL (ref 0–0.85)
MONOCYTES NFR BLD AUTO: 8.1 % (ref 0–13.4)
NEUTROPHILS # BLD AUTO: 3.86 K/UL (ref 1.82–7.42)
NEUTROPHILS NFR BLD: 52 % (ref 44–72)
NRBC # BLD AUTO: 0 K/UL
NRBC BLD-RTO: 0 /100 WBC
PLATELET # BLD AUTO: 227 K/UL (ref 164–446)
PMV BLD AUTO: 10.2 FL (ref 9–12.9)
POTASSIUM SERPL-SCNC: 4.1 MMOL/L (ref 3.6–5.5)
PROT SERPL-MCNC: 7.3 G/DL (ref 6–8.2)
RBC # BLD AUTO: 4.89 M/UL (ref 4.7–6.1)
SODIUM SERPL-SCNC: 144 MMOL/L (ref 135–145)
TROPONIN T SERPL-MCNC: 6 NG/L (ref 6–19)
TROPONIN T SERPL-MCNC: 8 NG/L (ref 6–19)
WBC # BLD AUTO: 7.4 K/UL (ref 4.8–10.8)

## 2022-01-20 PROCEDURE — 71045 X-RAY EXAM CHEST 1 VIEW: CPT

## 2022-01-20 PROCEDURE — 99215 OFFICE O/P EST HI 40 MIN: CPT | Performed by: FAMILY MEDICINE

## 2022-01-20 PROCEDURE — 36415 COLL VENOUS BLD VENIPUNCTURE: CPT

## 2022-01-20 PROCEDURE — 93000 ELECTROCARDIOGRAM COMPLETE: CPT | Performed by: FAMILY MEDICINE

## 2022-01-20 PROCEDURE — A9270 NON-COVERED ITEM OR SERVICE: HCPCS | Performed by: EMERGENCY MEDICINE

## 2022-01-20 PROCEDURE — 700102 HCHG RX REV CODE 250 W/ 637 OVERRIDE(OP): Performed by: EMERGENCY MEDICINE

## 2022-01-20 PROCEDURE — 85025 COMPLETE CBC W/AUTO DIFF WBC: CPT

## 2022-01-20 PROCEDURE — 84484 ASSAY OF TROPONIN QUANT: CPT

## 2022-01-20 PROCEDURE — 99284 EMERGENCY DEPT VISIT MOD MDM: CPT

## 2022-01-20 PROCEDURE — 80053 COMPREHEN METABOLIC PANEL: CPT

## 2022-01-20 PROCEDURE — 93005 ELECTROCARDIOGRAM TRACING: CPT

## 2022-01-20 PROCEDURE — 93005 ELECTROCARDIOGRAM TRACING: CPT | Performed by: EMERGENCY MEDICINE

## 2022-01-20 RX ORDER — OMEPRAZOLE 40 MG/1
40 CAPSULE, DELAYED RELEASE ORAL DAILY
Qty: 30 CAPSULE | Refills: 3 | Status: SHIPPED | OUTPATIENT
Start: 2022-01-20 | End: 2022-04-18 | Stop reason: SDUPTHER

## 2022-01-20 RX ORDER — AMLODIPINE BESYLATE 5 MG/1
5 TABLET ORAL DAILY
COMMUNITY
Start: 2022-01-12 | End: 2022-05-16

## 2022-01-20 RX ORDER — GLUCAGON 3 MG/1
3 POWDER NASAL
Qty: 1 EACH | Refills: 3 | Status: SHIPPED | OUTPATIENT
Start: 2022-01-20 | End: 2023-12-11

## 2022-01-20 RX ORDER — INSULIN GLARGINE 100 [IU]/ML
47 INJECTION, SOLUTION SUBCUTANEOUS EVERY EVENING
Qty: 5 EACH | Refills: 6 | Status: SHIPPED | OUTPATIENT
Start: 2022-01-20 | End: 2022-03-28

## 2022-01-20 RX ORDER — TADALAFIL 20 MG/1
20 TABLET ORAL PRN
Qty: 30 TABLET | Refills: 3 | Status: SHIPPED | OUTPATIENT
Start: 2022-01-20 | End: 2023-07-24 | Stop reason: SDUPTHER

## 2022-01-20 RX ORDER — OMEPRAZOLE 20 MG/1
40 CAPSULE, DELAYED RELEASE ORAL ONCE
Status: COMPLETED | OUTPATIENT
Start: 2022-01-20 | End: 2022-01-20

## 2022-01-20 RX ADMIN — OMEPRAZOLE 40 MG: 20 CAPSULE, DELAYED RELEASE ORAL at 21:23

## 2022-01-20 ASSESSMENT — PATIENT HEALTH QUESTIONNAIRE - PHQ9: CLINICAL INTERPRETATION OF PHQ2 SCORE: 0

## 2022-01-21 ENCOUNTER — TELEPHONE (OUTPATIENT)
Dept: MEDICAL GROUP | Facility: PHYSICIAN GROUP | Age: 67
End: 2022-01-21

## 2022-01-21 NOTE — DISCHARGE INSTRUCTIONS
Continue following a strict diabetic diet  Take your blood pressure medications as directed and check your blood pressure daily, if you are still consistently running over 150 systolic please let your doctor know as they may need to increase your dosage.  Continue taking 1 baby aspirin a day.  I am switching your GERD medication, follow a strict diet with small frequent meals, do not lie flat for at least 1 hour past eating, elevate the head of your bed by 30 degrees, avoid all alcohol, nicotine and caffeine products.  Follow-up with your primary care provider this next week for recheck and return immediately if there is any change or worsening in your condition.  All of your laboratory tests tonight do not indicate any type of cardiac problem but I would recommend an outpatient cardiac evaluation which can be done when your Medicare kicks in.

## 2022-01-21 NOTE — ASSESSMENT & PLAN NOTE
Chronic issue  The patient diagnosed 43 years ago  On basaglar 47U daily  However, insurance only wanted lantus 47U  Follows up with ophtho

## 2022-01-21 NOTE — ED NOTES
(Assist RN) Dr. Atkins in to reevaluate pt. Discharge teaching and paperwork provided regarding chest pressure and all questions/concerns answered. VSS,  assessment stable. Given information regarding home care and reasons to follow up with ED or primary MD.

## 2022-01-21 NOTE — PROGRESS NOTES
Subjective:     Chief Complaint   Patient presents with   • Medication Refill     Basaglar, freestyle don sensor,TADALFIL NOT Sildenafil   • Diabetes Follow-up   • Eye Problem     Irritation, started just before appointment       HPI:   Sukumar presents today to discuss the following.    Type 1 diabetes mellitus without complication (HCC)  Chronic issue  The patient diagnosed 43 years ago  On basaglar 47U daily  However, insurance only wanted lantus 47U  Follows up with ophtho    Other male erectile dysfunction  Chronic issue  Would like to continue with cialis     Other chest pain  New problem  Started having chest pain a couple of months ago  Short lived and he feels a dull pain with tightness.  It is unrelated to activity.  Palpating over the chest does not recreate the pain.  Denies any active chest pain at this time.  Ideally he would like to hold off on any treatment until April when his insurance changes.      Past Medical History:   Diagnosis Date   • HTN (hypertension)        Current Outpatient Medications Ordered in Epic   Medication Sig Dispense Refill   • amLODIPine (NORVASC) 5 MG Tab Take 5 mg by mouth every day. FOR 90 DAYS     • insulin glargine (INSULIN GLARGINE) 100 UNIT/ML Solution Pen-injector injection Inject 47 Units under the skin every evening. 5 Each 6   • tadalafil (CIALIS) 20 MG tablet Take 1 Tablet by mouth as needed for Erectile Dysfunction. 30 Tablet 3   • Glucagon (BAQSIMI TWO PACK) 3 MG/DOSE Powder Administer 3 mg into affected nostril(S) one time as needed for up to 1 dose. 1 Each 3   • atorvastatin (LIPITOR) 10 MG Tab Take 1 tablet by mouth once daily 90 Tablet 3   • Continuous Blood Gluc Sensor (FREESTYLE DON SENSOR SYSTEM) [facility] Follow instructions on the box 6 Each 3   • Multiple Vitamin (DAILY-JEANNA) Tab Take 1 tablet by mouth every day.     • glucosamine Sulfate 500 MG Cap Take 500 mg by mouth 3 times a day with meals.     • aspirin (ASPIRIN 81) 81 MG Chew Tab chewable tablet  "Take 81 mg by mouth every day.     • loratadine (CLARITIN) 10 MG Tab Take 10 mg by mouth every day.       No current [facility]-ordered facility-administered medications on file.       Allergies:  Patient has no known allergies.    Health Maintenance: Completed    ROS:  Gen: no fevers/chills, no changes in weight  Eyes: no changes in vision  Pulm: no sob, no cough  CV:  no palpitations  GI: no nausea/vomiting, no diarrhea      Objective:     Exam:  /62 (BP Location: Right arm, Patient Position: Sitting, BP Cuff Size: Adult)   Pulse (!) 57   Temp 37 °C (98.6 °F) (Temporal)   Ht 1.803 m (5' 11\")   Wt 74.2 kg (163 lb 9.6 oz)   SpO2 94%   BMI 22.82 kg/m²  Body mass index is 22.82 kg/m².    Constitutional: Alert, no distress, well-groomed.  Skin: Warm, dry, good turgor, no rashes in visible areas.  Eye: Equal, round and reactive, conjunctiva clear, lids normal.  ENMT: Lips without lesions, good dentition, moist mucous membranes.  Neck: Trachea midline, no masses, no thyromegaly.  Respiratory: Unlabored respiratory effort, no cough.  MSK: Normal gait, moves all extremities.  Neuro: Grossly non-focal.   Psych: Alert and oriented x3, normal affect and mood.          Assessment & Plan:     66 y.o. male with the following -     1. Type 1 diabetes mellitus without complication (HCC)  Chronic, stable condition.  Patient due for repeat labs.  Requesting refill for glargine.  - insulin glargine (INSULIN GLARGINE) 100 UNIT/ML Solution Pen-injector injection; Inject 47 Units under the skin every evening.  Dispense: 5 Each; Refill: 6  - Hemoglobin A1c; Future    2. Other male erectile dysfunction  Chronic, stable condition.  Continue with Cialis.    3. Screening for colorectal cancer  - Referral to GI for Colonoscopy    4. Other chest pain  New problem.  Unknown etiology and prognosis.  The patient has been complaining of chest tightness/dull aches in the center of the chest over the past couple of months.  He denies any chest " pain at this time.  It is unrelated to activity.  As such, it is atypical for cardiac etiology.  I did give him the warning that he should go to the emergency room should he have new chest pain once again.  He is hesitant given the fact that he is about to change insurance.  However he verbalizes understanding about seeking urgent medical attention for this problem.  I did perform an EKG in the office today which demonstrated possible ST elevation in the anterior leads.  Given this finding I do recommend the patient goes to the emergency room for more prompt evaluation.  This was explained to the patient he verbalized understanding.  - EKG - Clinic Performed      Return in about 3 months (around 4/20/2022).    Please note that this dictation was created using voice recognition software. I have made every reasonable attempt to correct obvious errors, but I expect that there are errors of grammar and possibly content that I did not discover before finalizing the note.

## 2022-01-21 NOTE — ED TRIAGE NOTES
"Chief Complaint   Patient presents with   • Sent by MD     for an abnormal EKG   • Chest Pain     intermittent x 2 months, described as dull pain and some pressure on the left side of his chest. Denies any SOB and has been able to maintain regular activity levels.      BP (!) 183/83   Pulse 68   Temp 36 °C (96.8 °F) (Temporal)   Resp 18   Ht 1.803 m (5' 11\")   Wt 75.3 kg (166 lb 0.1 oz)   SpO2 98%   BMI 23.15 kg/m²     Pt ambulatory to triage for above, protocol ordered.   "

## 2022-01-21 NOTE — ASSESSMENT & PLAN NOTE
New problem  Started having chest pain a couple of months ago  Short lived and he feels a dull pain with tightness.  It is unrelated to activity.  Palpating over the chest does not recreate the pain.  Denies any active chest pain at this time.  Ideally he would like to hold off on any treatment until April when his insurance changes.

## 2022-01-21 NOTE — TELEPHONE ENCOUNTER
Phone conversation with patient, he wants to schedule follow-up appointment with his primary care provider following his ED visit on 1/20/22, he asked me to call him back on 1/24/22 to schedule same.      1/24/22 called patient & left voicemail message, asking him to call Nurse Yanna at [phone] to schedule his follow-up appointment with his primary care provider following his ED visit on 1/20/22.

## 2022-01-21 NOTE — ED NOTES
"RV@ 1937  Patient stats \"I feel fine,  My doctor did an EKG and had me come in, but I feel fine.\"  Apologized for wait times.   "

## 2022-01-21 NOTE — ED PROVIDER NOTES
"ED Provider Note     Scribed for Farzaneh Tyler D.O. by Manuel Ring. 1/20/2022, 9:00 PM.     Primary care provider: Soren Awad M.D.  Means of arrival: Walk-in         History obtained from: Patient  History limited by: None    CHIEF COMPLAINT  Chief Complaint   Patient presents with   • Sent by MD     for an abnormal EKG   • Chest Pain     intermittent x 2 months, described as dull pain and some pressure on the left side of his chest. Denies any SOB and has been able to maintain regular activity levels.        HPI  Sukumar Tyson is a 66 y.o. male who presents to the emergency Department for acute, mild intermittent chest pain onset 2 months ago. Per patient, he was at his primary provider for his routine appointment. He had an abnormal EKG which showed a slight ST elevation and was told to present to the ED but states that he feels normal. He describes his pain as \"dull pressure.\" He additionally notes that he is normally active. Denies shortness of breath, nausea, vomiting, or diaphoresis. Patient states that he had COVID a few months ago and believes his symptoms may be associated to the virus. No alleviating or exacerbating factors reported. Denies family history of heart problems. He has a history of GERD and takes Pepcid 20 mg daily but it does not seem to be working as well anymore.  He also takes 1 baby aspirin a day.  He denies tobacco or substance abuse.  He states he works out regularly [place] by walking and he skis.  He is an insulin-dependent diabetic type I.    REVIEW OF SYSTEMS  Pertinent positives include chest pain. Pertinent negatives include no shortness of breath, nausea, vomiting, or diaphoresis.   See HPI for further details. All other systems are negative.    PAST MEDICAL HISTORY  Past Medical History:   Diagnosis Date   • HTN (hypertension)        FAMILY HISTORY  Family History   Problem Relation Age of Onset   • No Known Problems Mother    • No Known Problems Father  " "      SOCIAL HISTORY  Social History     Tobacco Use   • Smoking status: Never Smoker   • Smokeless tobacco: Never Used   Vaping Use   • Vaping Use: Never used   Substance Use Topics   • Alcohol use: Yes     Alcohol/week: 1.2 oz     Types: 2 Cans of beer per week   • Drug use: Never      Social History     Substance and Sexual Activity   Drug Use Never       SURGICAL HISTORY  History reviewed. No pertinent surgical history.    CURRENT MEDICATIONS  No current facility-administered medications for this encounter.    Current Outpatient Medications:   •  amLODIPine (NORVASC) 5 MG Tab, Take 5 mg by mouth every day. FOR 90 DAYS, Disp: , Rfl:   •  insulin glargine (INSULIN GLARGINE) 100 UNIT/ML Solution Pen-injector injection, Inject 47 Units under the skin every evening., Disp: 5 Each, Rfl: 6  •  tadalafil (CIALIS) 20 MG tablet, Take 1 Tablet by mouth as needed for Erectile Dysfunction., Disp: 30 Tablet, Rfl: 3  •  Glucagon (BAQSIMI TWO PACK) 3 MG/DOSE Powder, Administer 3 mg into affected nostril(S) one time as needed for up to 1 dose., Disp: 1 Each, Rfl: 3  •  atorvastatin (LIPITOR) 10 MG Tab, Take 1 tablet by mouth once daily, Disp: 90 Tablet, Rfl: 3  •  Continuous Blood Gluc Sensor (FREESTYLE DON SENSOR SYSTEM) [facility] [place], Follow instructions on the box, Disp: 6 Each, Rfl: 3  •  Multiple Vitamin (DAILY-JEANNA) Tab, Take 1 tablet by mouth every day., Disp: , Rfl:   •  glucosamine Sulfate 500 MG Cap, Take 500 mg by mouth 3 times a day with meals., Disp: , Rfl:   •  aspirin (ASPIRIN 81) 81 MG Chew Tab chewable tablet, Take 81 mg by mouth every day., Disp: , Rfl:   •  loratadine (CLARITIN) 10 MG Tab, Take 10 mg by mouth every day., Disp: , Rfl:     ALLERGIES  No Known Allergies    PHYSICAL EXAM  VITAL SIGNS: BP (!) 173/76   Pulse 67   Temp 36.1 °C (96.9 °F) (Temporal)   Resp 18   Ht 1.803 m (5' 11\")   Wt 75.3 kg (166 lb 0.1 oz)   SpO2 98% Comment: Room air  BMI 23.15 kg/m²     Constitutional: Patient is well developed, " well nourished. Non-toxic appearing. No acute distress.   HENT: Normocephalic, atraumatic.  Moist oral mucosa.  Cardiovascular: Normal heart rate and Regular rhythm. No murmur.  Thorax & Lungs: Clear and equal breath sounds with good excursion. No respiratory distress, no rhonchi, wheezing or rales.  Abdomen: Bowel sounds normal in all four quadrants. Soft ,nontender, no rebound, guarding, palpable masses.   Skin: Warm, Dry, No erythema, No rashes.   Extremities: Peripheral pulses 4/4 No edema, No tenderness, normal range of motion.  Neurologic: Alert & oriented x 3, Normal motor function, Normal sensory function.  Psychiatric: Affect normal, Judgment normal, Mood normal.     DIAGNOSTICS/PROCEDURES    LABS  Results for orders placed or performed during the hospital encounter of 01/20/22   CBC with Differential   Result Value Ref Range    WBC 7.4 4.8 - 10.8 K/uL    RBC 4.89 4.70 - 6.10 M/uL    Hemoglobin 15.4 14.0 - 18.0 g/dL    Hematocrit 45.8 42.0 - 52.0 %    MCV 93.7 81.4 - 97.8 fL    MCH 31.5 27.0 - 33.0 pg    MCHC 33.6 (L) 33.7 - 35.3 g/dL    RDW 43.8 35.9 - 50.0 fL    Platelet Count 227 164 - 446 K/uL    MPV 10.2 9.0 - 12.9 fL    Neutrophils-Polys 52.00 44.00 - 72.00 %    Lymphocytes 37.30 22.00 - 41.00 %    Monocytes 8.10 0.00 - 13.40 %    Eosinophils 1.90 0.00 - 6.90 %    Basophils 0.40 0.00 - 1.80 %    Immature Granulocytes 0.30 0.00 - 0.90 %    Nucleated RBC 0.00 /100 WBC    Neutrophils (Absolute) 3.86 1.82 - 7.42 K/uL    Lymphs (Absolute) 2.77 1.00 - 4.80 K/uL    Monos (Absolute) 0.60 0.00 - 0.85 K/uL    Eos (Absolute) 0.14 0.00 - 0.51 K/uL    Baso (Absolute) 0.03 0.00 - 0.12 K/uL    Immature Granulocytes (abs) 0.02 0.00 - 0.11 K/uL    NRBC (Absolute) 0.00 K/uL   Complete Metabolic Panel (CMP)   Result Value Ref Range    Sodium 144 135 - 145 mmol/L    Potassium 4.1 3.6 - 5.5 mmol/L    Chloride 108 96 - 112 mmol/L    Co2 25 20 - 33 mmol/L    Anion Gap 11.0 7.0 - 16.0    Glucose 57 (L) 65 - 99 mg/dL    Bun  23 (H) 8 - 22 mg/dL    Creatinine 0.91 0.50 - 1.40 mg/dL    Calcium 9.4 8.5 - 10.5 mg/dL    AST(SGOT) 28 12 - 45 U/L    ALT(SGPT) 29 2 - 50 U/L    Alkaline Phosphatase 68 30 - 99 U/L    Total Bilirubin 0.6 0.1 - 1.5 mg/dL    Albumin 4.6 3.2 - 4.9 g/dL    Total Protein 7.3 6.0 - 8.2 g/dL    Globulin 2.7 1.9 - 3.5 g/dL    A-G Ratio 1.7 g/dL   Troponin   Result Value Ref Range    Troponin T 8 6 - 19 ng/L   ESTIMATED GFR   Result Value Ref Range    GFR If African American >60 >60 mL/min/1.73 m 2    GFR If Non African American >60 >60 mL/min/1.73 m 2   TROPONIN   Result Value Ref Range    Troponin T 6 6 - 19 ng/L   EKG   Result Value Ref Range    Report       [facility] Emergency Dept.    Test Date:  2022  Pt Name:    SUSAN PAIGE             Department: ER  MRN:        [id number]                      Room:  Gender:     Male                         Technician: [id number]  :        [id number]                   Requested By:ER TRIAGE PROTOCOL  Order #:    [id number]                    Reading MD: TITI BOWLES DO    Measurements  Intervals                                Axis  Rate:       57                           P:          64  AL:         117                          QRS:        95  QRSD:       108                          T:          57  QT:         445  QTc:        434    Interpretive Statements  Sinus bradycardia  Borderline short AL interval  Probable left atrial enlargement  Right axis deviation  Borderline ST elevation, anterior leads  Baseline wander in lead(s) V6  No previous ECG available for comparison  Electronically Signed On 2022 22:17:14 PST by TITI BOWLES DO       Labs reviewed by me    EKG  12 Lead EKG interpreted by me as shown above.    RADIOLOGY/PROCEDURES  DX-CHEST-PORTABLE (1 VIEW)   Final Result      No acute cardiopulmonary abnormality.        Results and radiologist interpretation reviewed by me.     COURSE & MEDICAL DECISION MAKING  Pertinent Labs & Imaging  studies reviewed. (See chart for details)    9:00 PM - Patient seen and evaluated at bedside. Ordered for DX-Chest, Troponin, Estimated GFR, CBC w/ Diff, CMP, Troponin, and EKG to evaluate. Patient will be treated with Prilosec 40 mg PO for his symptoms. Differential diagnoses include, but are not limited to, GERD vs. Pulmonary Etiology from recent COVID vs. Cardiac    Patient feels much better after the Prilosec.  Second troponin level came back normal.  He will be sent home in stable condition.    10:18 PM - Patient was reevaluated at bedside. I informed him that his first and second Troponin levels are normal. I had a conversation with him about my concerns of his blood pressure which he will monitor at home. Discussed plans and precautions for discharge. He will be prescribed Prilosec. Patient understands and verbalizes agreement to the plan of discharge.  He is to follow-up with his primary care provider for scheduling for an upper GI, outpatient echocardiogram and stress test and he is to return if worsening    The patient will return for new or worsening symptoms and is stable at the time of discharge.    DISPOSITION:  Patient will be discharged home in stable condition.    FOLLOW UP:  Soren Awad M.D.  [address]  [facility] [id number] [phone]    Schedule an appointment as soon as possible for a visit in 1 week  For recheck of your chest pressure and blood pressure.    OUTPATIENT MEDICATIONS:  New Prescriptions    OMEPRAZOLE (PRILOSEC) 40 MG DELAYED-RELEASE CAPSULE    Take 1 Capsule by mouth every day.     FINAL IMPRESSION  1. Left chest pressure    2. Type 1 diabetes mellitus without complication (HCC)    3. Gastroesophageal reflux disease without esophagitis      Manuel PAINTING (Kurt), am scribing for, and in the presence of, Farzaneh Tyler D.O..    Electronically signed by: Manuel Ring (Kurt), 1/20/2022    Farzaneh PAINTING D.O. personally performed the services described in this  documentation, as scribed by Manuel Ring in my presence, and it is both accurate and complete.    The note accurately reflects work and decisions made by me.  Farzaneh Tyler D.O.  1/21/2022  2:37 AM

## 2022-02-01 ENCOUNTER — TELEPHONE (OUTPATIENT)
Dept: MEDICAL GROUP | Facility: PHYSICIAN GROUP | Age: 67
End: 2022-02-01

## 2022-02-01 NOTE — TELEPHONE ENCOUNTER
Phone Number Called: [phone]    Call outcome: Left detailed message for patient. Informed to call back with any additional questions.    Message: Please call Nurse Yanna with [facility] at [phone] regarding getting an up-to-date blood pressure reading.    Patient returned my call on 2/2/22, provided two recent BP readings, see Flowsheets, readings within normal range.

## 2022-02-02 VITALS — SYSTOLIC BLOOD PRESSURE: 129 MMHG | DIASTOLIC BLOOD PRESSURE: 73 MMHG | HEART RATE: 56 BPM

## 2022-03-28 DIAGNOSIS — E10.9 TYPE 1 DIABETES MELLITUS WITHOUT COMPLICATION (HCC): ICD-10-CM

## 2022-03-28 RX ORDER — INSULIN GLARGINE 100 [IU]/ML
INJECTION, SOLUTION SUBCUTANEOUS
Qty: 15 ML | Refills: 3 | Status: SHIPPED | OUTPATIENT
Start: 2022-03-28 | End: 2022-04-18

## 2022-03-28 RX ORDER — INSULIN GLARGINE 100 [IU]/ML
INJECTION, SOLUTION SUBCUTANEOUS
Qty: 15 ML | Refills: 3 | OUTPATIENT
Start: 2022-03-28

## 2022-04-06 ENCOUNTER — HOSPITAL ENCOUNTER (OUTPATIENT)
Dept: LAB | Facility: MEDICAL CENTER | Age: 67
End: 2022-04-06
Attending: FAMILY MEDICINE
Payer: MEDICARE

## 2022-04-06 DIAGNOSIS — Z12.5 SCREENING FOR MALIGNANT NEOPLASM OF PROSTATE: ICD-10-CM

## 2022-04-06 DIAGNOSIS — Z00.00 WELL ADULT EXAM: ICD-10-CM

## 2022-04-06 LAB
ALBUMIN SERPL BCP-MCNC: 4.4 G/DL (ref 3.2–4.9)
ALBUMIN/GLOB SERPL: 1.7 G/DL
ALP SERPL-CCNC: 86 U/L (ref 30–99)
ALT SERPL-CCNC: 26 U/L (ref 2–50)
ANION GAP SERPL CALC-SCNC: 8 MMOL/L (ref 7–16)
AST SERPL-CCNC: 23 U/L (ref 12–45)
BILIRUB SERPL-MCNC: 0.7 MG/DL (ref 0.1–1.5)
BUN SERPL-MCNC: 26 MG/DL (ref 8–22)
CALCIUM SERPL-MCNC: 9.3 MG/DL (ref 8.5–10.5)
CHLORIDE SERPL-SCNC: 104 MMOL/L (ref 96–112)
CO2 SERPL-SCNC: 25 MMOL/L (ref 20–33)
CREAT SERPL-MCNC: 0.98 MG/DL (ref 0.5–1.4)
EST. AVERAGE GLUCOSE BLD GHB EST-MCNC: 143 MG/DL
GFR SERPLBLD CREATININE-BSD FMLA CKD-EPI: 86 ML/MIN/1.73 M 2
GLOBULIN SER CALC-MCNC: 2.6 G/DL (ref 1.9–3.5)
GLUCOSE SERPL-MCNC: 303 MG/DL (ref 65–99)
HBA1C MFR BLD: 6.6 % (ref 4–5.6)
POTASSIUM SERPL-SCNC: 4.9 MMOL/L (ref 3.6–5.5)
PROT SERPL-MCNC: 7 G/DL (ref 6–8.2)
PSA SERPL-MCNC: 1.52 NG/ML (ref 0–4)
SODIUM SERPL-SCNC: 137 MMOL/L (ref 135–145)

## 2022-04-06 PROCEDURE — 84153 ASSAY OF PSA TOTAL: CPT | Mod: GA

## 2022-04-06 PROCEDURE — 83036 HEMOGLOBIN GLYCOSYLATED A1C: CPT | Mod: GA

## 2022-04-06 PROCEDURE — 80053 COMPREHEN METABOLIC PANEL: CPT

## 2022-04-06 PROCEDURE — 36415 COLL VENOUS BLD VENIPUNCTURE: CPT | Mod: GA

## 2022-04-18 ENCOUNTER — OFFICE VISIT (OUTPATIENT)
Dept: MEDICAL GROUP | Facility: PHYSICIAN GROUP | Age: 67
End: 2022-04-18
Payer: MEDICARE

## 2022-04-18 VITALS
HEART RATE: 67 BPM | SYSTOLIC BLOOD PRESSURE: 116 MMHG | BODY MASS INDEX: 23.38 KG/M2 | HEIGHT: 71 IN | OXYGEN SATURATION: 97 % | WEIGHT: 167 LBS | TEMPERATURE: 98.1 F | DIASTOLIC BLOOD PRESSURE: 70 MMHG

## 2022-04-18 DIAGNOSIS — E10.9 TYPE 1 DIABETES MELLITUS WITHOUT COMPLICATION (HCC): ICD-10-CM

## 2022-04-18 DIAGNOSIS — K21.9 GASTROESOPHAGEAL REFLUX DISEASE WITHOUT ESOPHAGITIS: ICD-10-CM

## 2022-04-18 PROCEDURE — 99214 OFFICE O/P EST MOD 30 MIN: CPT | Performed by: FAMILY MEDICINE

## 2022-04-18 RX ORDER — INSULIN LISPRO 100 [IU]/ML
8 INJECTION, SOLUTION INTRAVENOUS; SUBCUTANEOUS
COMMUNITY

## 2022-04-18 RX ORDER — OMEPRAZOLE 40 MG/1
40 CAPSULE, DELAYED RELEASE ORAL DAILY
Qty: 90 CAPSULE | Refills: 3 | Status: SHIPPED | OUTPATIENT
Start: 2022-04-18 | End: 2023-05-01

## 2022-04-18 ASSESSMENT — FIBROSIS 4 INDEX: FIB4 SCORE: 1.29

## 2022-04-18 NOTE — PROGRESS NOTES
Subjective:     Chief Complaint   Patient presents with   • Hospital Follow-up     From EKG Abnormality, No chest pain since ED visit   • Diabetes Follow-up       HPI:   Sukumar presents today to discuss the following.    Type 1 diabetes mellitus without complication (HCC)  Chronic issue  On insulin - basaglar 47U daily. However, he would like to be transitioned to Tresiba instead.   Retinal exam is normal     Gastroesophageal reflux disease without esophagitis  Chronic issue  Likely cause of his chest pain  On omeprazole 40mg daily       Past Medical History:   Diagnosis Date   • HTN (hypertension)        Current Outpatient Medications Ordered in Epic   Medication Sig Dispense Refill   • insulin lispro (HUMALOG,ADMELOG) 100 UNIT/ML Solution Pen-injector injection PEN      • Insulin Degludec 100 UNIT/ML Solution Pen-injector Inject 47 Units under the skin every day for 30 days. 4 Each 3   • Continuous Blood Gluc  (FREESTYLE DON 2 READER) Device Follow box instructions 1 Each 3   • omeprazole (PRILOSEC) 40 MG delayed-release capsule Take 1 Capsule by mouth every day. 90 Capsule 3   • amLODIPine (NORVASC) 5 MG Tab Take 5 mg by mouth every day. FOR 90 DAYS     • tadalafil (CIALIS) 20 MG tablet Take 1 Tablet by mouth as needed for Erectile Dysfunction. 30 Tablet 3   • Glucagon (BAQSIMI TWO PACK) 3 MG/DOSE Powder Administer 3 mg into affected nostril(S) one time as needed for up to 1 dose. 1 Each 3   • atorvastatin (LIPITOR) 10 MG Tab Take 1 tablet by mouth once daily 90 Tablet 3   • Continuous Blood Gluc Sensor (FREESTYLE DON SENSOR SYSTEM) Misc Follow instructions on the box 6 Each 3   • Multiple Vitamin (DAILY-JEANNA) Tab Take 1 tablet by mouth every day.     • glucosamine Sulfate 500 MG Cap Take 500 mg by mouth 3 times a day with meals.     • aspirin (ASA) 81 MG Chew Tab chewable tablet Take 81 mg by mouth every day.     • loratadine (CLARITIN) 10 MG Tab Take 10 mg by mouth every day.       No current  "Epic-ordered facility-administered medications on file.       Allergies:  Patient has no known allergies.    Health Maintenance: Completed    ROS:  Gen: no fevers/chills, no changes in weight  Eyes: no changes in vision  Pulm: no sob, no cough  CV: no chest pain, no palpitations  GI: no nausea/vomiting, no diarrhea      Objective:     Exam:  /70 (BP Location: Right arm, Patient Position: Sitting, BP Cuff Size: Adult)   Pulse 67   Temp 36.7 °C (98.1 °F) (Temporal)   Ht 1.803 m (5' 11\")   Wt 75.8 kg (167 lb)   SpO2 97%   BMI 23.29 kg/m²  Body mass index is 23.29 kg/m².    Constitutional: Alert, no distress, well-groomed.  Skin: Warm, dry, good turgor, no rashes in visible areas.  Eye: Equal, round and reactive, conjunctiva clear, lids normal.  ENMT: Lips without lesions, good dentition, moist mucous membranes.  Neck: Trachea midline, no masses, no thyromegaly.  Respiratory: Unlabored respiratory effort, no cough.  MSK: Normal gait, moves all extremities.  Neuro: Grossly non-focal.   Psych: Alert and oriented x3, normal affect and mood.        Assessment & Plan:     67 y.o. male with the following -     1. Type 1 diabetes mellitus without complication (HCC)  Chronic issue, stable.  At this time we will transition to Tresiba per patient request.  This is compatible with his formulary.  Continue with 47 units though he may decrease 20% down of usual dose.  Slowly titrate.  I will repeat A1c in 3 months.  - Insulin Degludec 100 UNIT/ML Solution Pen-injector; Inject 47 Units under the skin every day for 30 days.  Dispense: 4 Each; Refill: 3  - Continuous Blood Gluc  (FREESTYLE DON 2 READER) Device; Follow box instructions  Dispense: 1 Each; Refill: 3  - HEMOGLOBIN A1C; Future    2. Gastroesophageal reflux disease without esophagitis  Chronic condition.  Continue with omeprazole.  - omeprazole (PRILOSEC) 40 MG delayed-release capsule; Take 1 Capsule by mouth every day.  Dispense: 90 Capsule; Refill: " 3      Return in about 3 months (around 7/18/2022).    Please note that this dictation was created using voice recognition software. I have made every reasonable attempt to correct obvious errors, but I expect that there are errors of grammar and possibly content that I did not discover before finalizing the note.

## 2022-04-18 NOTE — ASSESSMENT & PLAN NOTE
Chronic issue  On insulin - basaglar 47U daily. However, he would like to be transitioned to Tresiba instead.   Retinal exam is normal

## 2022-04-25 ENCOUNTER — TELEPHONE (OUTPATIENT)
Dept: MEDICAL GROUP | Facility: PHYSICIAN GROUP | Age: 67
End: 2022-04-25
Payer: MEDICARE

## 2022-04-25 NOTE — TELEPHONE ENCOUNTER
VOICEMAIL  1. Caller Name: Lali with [employer]       Call Back Number: [phone]    2. Message: Lali [facility] requesting update regarding patient  Cristina prescription form.  Lali has left fax number [phone]    3. Patient approves office to leave a detailed voicemail/MyChart message: N\A

## 2022-08-09 ENCOUNTER — OFFICE VISIT (OUTPATIENT)
Dept: MEDICAL GROUP | Facility: PHYSICIAN GROUP | Age: 67
End: 2022-08-09
Payer: MEDICARE

## 2022-08-09 ENCOUNTER — HOSPITAL ENCOUNTER (OUTPATIENT)
Dept: LAB | Facility: MEDICAL CENTER | Age: 67
End: 2022-08-09
Attending: FAMILY MEDICINE
Payer: MEDICARE

## 2022-08-09 VITALS
BODY MASS INDEX: 23.8 KG/M2 | TEMPERATURE: 97.3 F | DIASTOLIC BLOOD PRESSURE: 64 MMHG | HEART RATE: 63 BPM | OXYGEN SATURATION: 95 % | SYSTOLIC BLOOD PRESSURE: 120 MMHG | WEIGHT: 170 LBS | HEIGHT: 71 IN

## 2022-08-09 DIAGNOSIS — E10.9 TYPE 1 DIABETES MELLITUS WITHOUT COMPLICATION (HCC): ICD-10-CM

## 2022-08-09 DIAGNOSIS — E78.2 MIXED HYPERLIPIDEMIA: ICD-10-CM

## 2022-08-09 DIAGNOSIS — M54.50 CHRONIC LOW BACK PAIN WITHOUT SCIATICA, UNSPECIFIED BACK PAIN LATERALITY: ICD-10-CM

## 2022-08-09 DIAGNOSIS — G89.29 CHRONIC LOW BACK PAIN WITHOUT SCIATICA, UNSPECIFIED BACK PAIN LATERALITY: ICD-10-CM

## 2022-08-09 LAB
CHOLEST SERPL-MCNC: 141 MG/DL (ref 100–199)
EST. AVERAGE GLUCOSE BLD GHB EST-MCNC: 143 MG/DL
FASTING STATUS PATIENT QL REPORTED: NORMAL
HBA1C MFR BLD: 6.6 % (ref 4–5.6)
HDLC SERPL-MCNC: 55 MG/DL
LDLC SERPL CALC-MCNC: 77 MG/DL
TRIGL SERPL-MCNC: 44 MG/DL (ref 0–149)

## 2022-08-09 PROCEDURE — 80061 LIPID PANEL: CPT

## 2022-08-09 PROCEDURE — 83036 HEMOGLOBIN GLYCOSYLATED A1C: CPT | Mod: GA

## 2022-08-09 PROCEDURE — 99214 OFFICE O/P EST MOD 30 MIN: CPT | Performed by: FAMILY MEDICINE

## 2022-08-09 PROCEDURE — 36415 COLL VENOUS BLD VENIPUNCTURE: CPT

## 2022-08-09 RX ORDER — INSULIN DEGLUDEC 100 U/ML
INJECTION, SOLUTION SUBCUTANEOUS
COMMUNITY
Start: 2022-07-15 | End: 2022-09-21

## 2022-08-09 ASSESSMENT — FIBROSIS 4 INDEX: FIB4 SCORE: 1.29

## 2022-08-09 NOTE — PROGRESS NOTES
Subjective:     Chief Complaint   Patient presents with   • Diabetes Follow-up   • Results     Colonoscopy        HPI:   Sukumar presents today to discuss the following.    Type 1 diabetes mellitus without complication (HCC)  Chronic issue  Last A1c at 6.6 4/22  On tresiba 34U qHS, lispro 7Uam and 14qHS    Due for repeat A1c    FBG has been in the 100s    Mixed hyperlipidemia  Chronic issue  lipitor 10    Chronic low back pain without sciatica  Chronic issue  Still has trouble with this  Has done PT w/o much help      Past Medical History:   Diagnosis Date   • HTN (hypertension)        Current Outpatient Medications Ordered in Epic   Medication Sig Dispense Refill   • TRESIBA FLEXTOUCH 100 UNIT/ML Solution Pen-injector INJECT 47 UNITS SUBCUTANEOUSLY ONCE DAILY     • amLODIPine (NORVASC) 5 MG Tab Take 1 tablet by mouth once daily for 90 days 90 Tablet 3   • insulin lispro (HUMALOG,ADMELOG) 100 UNIT/ML Solution Pen-injector injection PEN      • Continuous Blood Gluc  (FREESTYLE DON 2 READER) Device Follow box instructions 1 Each 3   • omeprazole (PRILOSEC) 40 MG delayed-release capsule Take 1 Capsule by mouth every day. 90 Capsule 3   • tadalafil (CIALIS) 20 MG tablet Take 1 Tablet by mouth as needed for Erectile Dysfunction. 30 Tablet 3   • Glucagon (BAQSIMI TWO PACK) 3 MG/DOSE Powder Administer 3 mg into affected nostril(S) one time as needed for up to 1 dose. 1 Each 3   • atorvastatin (LIPITOR) 10 MG Tab Take 1 tablet by mouth once daily 90 Tablet 3   • Continuous Blood Gluc Sensor (FREESTYLE DON SENSOR SYSTEM) [facility] Follow instructions on the box 6 Each 3   • Multiple Vitamin (DAILY-JEANNA) Tab Take 1 tablet by mouth every day.     • glucosamine Sulfate 500 MG Cap Take 500 mg by mouth 3 times a day with meals.     • aspirin (ASA) 81 MG Chew Tab chewable tablet Take 81 mg by mouth every day.     • loratadine (CLARITIN) 10 MG Tab Take 10 mg by mouth every day.       No current Epic-ordered  "facility-administered medications on file.       Allergies:  Patient has no known allergies.    Health Maintenance: Completed    ROS:  Gen: no fevers/chills, no changes in weight  Eyes: no changes in vision  Pulm: no sob, no cough  CV: no chest pain, no palpitations  GI: no nausea/vomiting, no diarrhea      Objective:     Exam:  /64 (BP Location: Left arm, Patient Position: Sitting, BP Cuff Size: Adult)   Pulse 63   Temp 36.3 °C (97.3 °F) (Temporal)   Ht 1.803 m (5' 11\")   Wt 77.1 kg (170 lb)   SpO2 95%   BMI 23.71 kg/m²  Body mass index is 23.71 kg/m².      Constitutional: Alert, no distress, well-groomed.  Skin: Warm, dry, good turgor, no rashes in visible areas.  Eye: Equal, round and reactive, conjunctiva clear, lids normal.  ENMT: Lips without lesions, good dentition, moist mucous membranes.  Neck: Trachea midline, no masses, no thyromegaly.  Respiratory: Unlabored respiratory effort, no cough.  MSK: Normal gait, moves all extremities.  Neuro: Grossly non-focal.   Psych: Alert and oriented x3, normal affect and mood.        Assessment & Plan:     67 y.o. male with the following -     1. Type 1 diabetes mellitus without complication (HCC)  Chronic condition.  Continue with current regimen.  Due for repeat labs    2. Mixed hyperlipidemia  Chronic condition.  Due for repeat labs.  Continue with statin  - Lipid Profile; Future    3. Chronic low back pain without sciatica, unspecified back pain laterality  Chronic, worsening condition.  Getting more sciatica bilaterally.  Will refer to orthopedics for further evaluation.  - Referral to Orthopedics      Return in about 4 months (around 12/9/2022).    Please note that this dictation was created using voice recognition software. I have made every reasonable attempt to correct obvious errors, but I expect that there are errors of grammar and possibly content that I did not discover before finalizing the note.      "

## 2022-08-09 NOTE — ASSESSMENT & PLAN NOTE
Chronic issue  Last A1c at 6.6 4/22  On tresiba 34U qHS, lispro 7Uam and 14qHS    Due for repeat A1c    FBG has been in the 100s

## 2022-09-21 RX ORDER — INSULIN DEGLUDEC 100 U/ML
INJECTION, SOLUTION SUBCUTANEOUS
Qty: 15 ML | Refills: 0 | Status: SHIPPED | OUTPATIENT
Start: 2022-09-21 | End: 2022-11-07

## 2022-10-13 DIAGNOSIS — E10.9 TYPE 1 DIABETES MELLITUS WITHOUT COMPLICATION (HCC): ICD-10-CM

## 2022-10-13 DIAGNOSIS — I10 ESSENTIAL HYPERTENSION: ICD-10-CM

## 2022-10-13 RX ORDER — ATORVASTATIN CALCIUM 10 MG/1
10 TABLET, FILM COATED ORAL DAILY
Qty: 90 TABLET | Refills: 3 | Status: SHIPPED | OUTPATIENT
Start: 2022-10-13 | End: 2023-08-01

## 2022-11-10 ENCOUNTER — PATIENT MESSAGE (OUTPATIENT)
Dept: HEALTH INFORMATION MANAGEMENT | Facility: OTHER | Age: 67
End: 2022-11-10

## 2023-01-11 ENCOUNTER — PATIENT MESSAGE (OUTPATIENT)
Dept: MEDICAL GROUP | Facility: PHYSICIAN GROUP | Age: 68
End: 2023-01-11
Payer: MEDICARE

## 2023-01-16 RX ORDER — INSULIN DEGLUDEC 100 U/ML
INJECTION, SOLUTION SUBCUTANEOUS
Qty: 15 ML | Refills: 6 | Status: SHIPPED | OUTPATIENT
Start: 2023-01-16 | End: 2023-07-24

## 2023-01-23 ENCOUNTER — OFFICE VISIT (OUTPATIENT)
Dept: MEDICAL GROUP | Facility: PHYSICIAN GROUP | Age: 68
End: 2023-01-23
Payer: MEDICARE

## 2023-01-23 ENCOUNTER — HOSPITAL ENCOUNTER (OUTPATIENT)
Dept: LAB | Facility: MEDICAL CENTER | Age: 68
End: 2023-01-23
Attending: FAMILY MEDICINE
Payer: MEDICARE

## 2023-01-23 VITALS
OXYGEN SATURATION: 96 % | WEIGHT: 171 LBS | BODY MASS INDEX: 23.94 KG/M2 | DIASTOLIC BLOOD PRESSURE: 60 MMHG | SYSTOLIC BLOOD PRESSURE: 142 MMHG | TEMPERATURE: 98 F | HEIGHT: 71 IN | HEART RATE: 62 BPM

## 2023-01-23 DIAGNOSIS — G62.9 NEUROPATHY: ICD-10-CM

## 2023-01-23 DIAGNOSIS — E10.9 TYPE 1 DIABETES MELLITUS WITHOUT COMPLICATION (HCC): ICD-10-CM

## 2023-01-23 DIAGNOSIS — Z23 NEED FOR VACCINATION: ICD-10-CM

## 2023-01-23 LAB
CREAT UR-MCNC: 83.01 MG/DL
EST. AVERAGE GLUCOSE BLD GHB EST-MCNC: 166 MG/DL
HBA1C MFR BLD: 7.4 % (ref 4–5.6)
MICROALBUMIN UR-MCNC: <1.2 MG/DL
MICROALBUMIN/CREAT UR: NORMAL MG/G (ref 0–30)

## 2023-01-23 PROCEDURE — G0009 ADMIN PNEUMOCOCCAL VACCINE: HCPCS | Performed by: FAMILY MEDICINE

## 2023-01-23 PROCEDURE — 83036 HEMOGLOBIN GLYCOSYLATED A1C: CPT | Mod: GA

## 2023-01-23 PROCEDURE — 82570 ASSAY OF URINE CREATININE: CPT

## 2023-01-23 PROCEDURE — 82043 UR ALBUMIN QUANTITATIVE: CPT

## 2023-01-23 PROCEDURE — 99214 OFFICE O/P EST MOD 30 MIN: CPT | Mod: 25 | Performed by: FAMILY MEDICINE

## 2023-01-23 PROCEDURE — 36415 COLL VENOUS BLD VENIPUNCTURE: CPT | Mod: GA

## 2023-01-23 PROCEDURE — 90677 PCV20 VACCINE IM: CPT | Performed by: FAMILY MEDICINE

## 2023-01-23 RX ORDER — BLOOD SUGAR DIAGNOSTIC
STRIP MISCELLANEOUS
Qty: 100 EACH | Refills: 3 | Status: SHIPPED | OUTPATIENT
Start: 2023-01-23

## 2023-01-23 ASSESSMENT — FIBROSIS 4 INDEX: FIB4 SCORE: 1.29

## 2023-01-23 ASSESSMENT — PATIENT HEALTH QUESTIONNAIRE - PHQ9: CLINICAL INTERPRETATION OF PHQ2 SCORE: 0

## 2023-01-23 NOTE — ASSESSMENT & PLAN NOTE
Chronic issue  Noted to have neuropathy to his toes and some pain to his upper thighs.  Wondering about EMG.

## 2023-01-23 NOTE — PROGRESS NOTES
Subjective:     Chief Complaint   Patient presents with    Diabetes Follow-up       HPI:   Sukumar presents today to discuss the following.    Neuropathy  Chronic issue  Noted to have neuropathy to his toes and some pain to his upper thighs.  Wondering about EMG.     Type 1 diabetes mellitus without complication (HCC)  Chronic issue  Stable at this time.   A1c at 6.6    Past Medical History:   Diagnosis Date    HTN (hypertension)        Current Outpatient Medications Ordered in Epic   Medication Sig Dispense Refill    Insulin Pen Needle (PEN NEEDLES) 32G X 5 MM Misc Use to inject insulin 4x daily subq 100 Each 3    Insulin Degludec (TRESIBA FLEXTOUCH) 100 UNIT/ML Solution Pen-injector INJECT 47-50 UNITS SUBCUTANEOUSLY ONCE DAILY 15 mL 6    atorvastatin (LIPITOR) 10 MG Tab Take 1 Tablet by mouth every day. 90 Tablet 3    amLODIPine (NORVASC) 5 MG Tab Take 1 tablet by mouth once daily for 90 days 90 Tablet 3    insulin lispro (HUMALOG,ADMELOG) 100 UNIT/ML Solution Pen-injector injection PEN       Continuous Blood Gluc  (FREESTYLE DON 2 READER) Device Follow box instructions 1 Each 3    omeprazole (PRILOSEC) 40 MG delayed-release capsule Take 1 Capsule by mouth every day. 90 Capsule 3    tadalafil (CIALIS) 20 MG tablet Take 1 Tablet by mouth as needed for Erectile Dysfunction. 30 Tablet 3    Glucagon (BAQSIMI TWO PACK) 3 MG/DOSE Powder Administer 3 mg into affected nostril(S) one time as needed for up to 1 dose. 1 Each 3    Continuous Blood Gluc Sensor (FREESTYLE DON SENSOR SYSTEM) [facility] [place] Follow instructions on the box 6 Each 3    Multiple Vitamin (DAILY-JEANNA) Tab Take 1 tablet by mouth every day.      glucosamine Sulfate 500 MG Cap Take 500 mg by mouth 3 times a day with meals. Once a day 1500 mg      aspirin (ASA) 81 MG Chew Tab chewable tablet Take 81 mg by mouth every day.      loratadine (CLARITIN) 10 MG Tab Take 10 mg by mouth every day.       No current [facility]-ordered facility-administered medications on  "file.       Allergies:  Patient has no known allergies.    Health Maintenance: Completed    ROS:  Gen: no fevers/chills, no changes in weight  Eyes: no changes in vision  Pulm: no sob, no cough  CV: no chest pain, no palpitations  GI: no nausea/vomiting, no diarrhea      Objective:     Exam:  BP (!) 142/60 (BP Location: Left arm, Patient Position: Sitting, BP Cuff Size: Adult)   Pulse 62   Temp 36.7 °C (98 °F) (Temporal)   Ht 1.803 m (5' 11\")   Wt 77.6 kg (171 lb)   SpO2 96%   BMI 23.85 kg/m²  Body mass index is 23.85 kg/m².      Constitutional: Alert, no distress, well-groomed.  Skin: Warm, dry, good turgor, no rashes in visible areas.  Eye: Equal, round and reactive, conjunctiva clear, lids normal.  ENMT: Lips without lesions, good dentition, moist mucous membranes.  Neck: Trachea midline, no masses, no thyromegaly.  Respiratory: Unlabored respiratory effort, no cough.  MSK: Normal gait, moves all extremities.  Neuro: Grossly non-focal.   Psych: Alert and oriented x3, normal affect and mood.  Monofilament testing to both legs and feet is intact        Assessment & Plan:     67 y.o. male with the following -     1. Neuropathy  Chronic, worsening condition.  The patient has been experiencing numbness to his left toes and now some discomfort to his upper thighs.  Questioning whether this is a neuropathy secondary to diabetes versus other.  I will refer to physiatry for possible EMG studies.  Overall monofilament testing today was noncontributory.  - Referral to Pain Clinic    2. Need for vaccination  - Pneumococcal Conjugate Vaccine 20-Valent (19 yrs+)    3. Type 1 diabetes mellitus without complication (HCC)  Chronic, stable condition.  Due for repeat labs.  Continue with prescribed regimen.  - Diabetic Monofilament Lower Extremity Exam  - HEMOGLOBIN A1C; Future  - MICROALBUMIN CREAT RATIO URINE; Future  - Referral to Pain Clinic  - Insulin Pen Needle (PEN NEEDLES) 32G X 5 MM Misc; Use to inject insulin 4x " daily subq  Dispense: 100 Each; Refill: 3      No follow-ups on file.          Please note that this dictation was created using voice recognition software. I have made every reasonable attempt to correct obvious errors, but I expect that there are errors of grammar and possibly content that I did not discover before finalizing the note.

## 2023-02-17 ENCOUNTER — OFFICE VISIT (OUTPATIENT)
Dept: PHYSICAL MEDICINE AND REHAB | Facility: MEDICAL CENTER | Age: 68
End: 2023-02-17
Payer: MEDICARE

## 2023-02-17 VITALS
SYSTOLIC BLOOD PRESSURE: 156 MMHG | DIASTOLIC BLOOD PRESSURE: 82 MMHG | HEART RATE: 64 BPM | OXYGEN SATURATION: 96 % | WEIGHT: 174.16 LBS | BODY MASS INDEX: 23.59 KG/M2 | TEMPERATURE: 97.2 F | HEIGHT: 72 IN

## 2023-02-17 DIAGNOSIS — M67.951 TENDINOPATHY OF RIGHT GLUTEUS MEDIUS: ICD-10-CM

## 2023-02-17 DIAGNOSIS — G62.9 NEUROPATHY: ICD-10-CM

## 2023-02-17 PROCEDURE — 99204 OFFICE O/P NEW MOD 45 MIN: CPT | Performed by: STUDENT IN AN ORGANIZED HEALTH CARE EDUCATION/TRAINING PROGRAM

## 2023-02-17 ASSESSMENT — FIBROSIS 4 INDEX: FIB4 SCORE: 1.29

## 2023-02-17 ASSESSMENT — PAIN SCALES - GENERAL: PAINLEVEL: 2=MINIMAL-SLIGHT

## 2023-02-17 ASSESSMENT — PATIENT HEALTH QUESTIONNAIRE - PHQ9: CLINICAL INTERPRETATION OF PHQ2 SCORE: 0

## 2023-02-17 NOTE — PROGRESS NOTES
New Patient Note    Interventional Pain and Spine  Physiatry (Physical Medicine and Rehabilitation)     Patient Name: Sukumar Tyson  : 1955  Date of Service: 2023  PCP: Soren Awad M.D.  Referring Provider: Soren Awad M*    Chief Complaint:   Chief Complaint   Patient presents with    New Patient     Neuropathy       HPI  HISTORY (2023):  Sukumar Tyson is a 67 y.o. male who presents today with a dull constant discomfort in his upper thighs for a couple of months and left big toe for several years. This doesn't limit him from doing activities. He is still able to ski without difficulty or exacerbation of pain. He denies noticeable numbness/tingling or weakness in his legs. He notes a history of right sciatica but denies current symptoms of this.  He was referred for an EMG by his PCP. He remembers getting an EMG at least 20 years ago in [place] which was normal. Notes that his A1c has typically been in the 6% range but his most recent A1c was 7.4%. This is the highest it's been.     Pain right now is 2/10 on the numeric pain scale. His pain at its best-worse level during the course of the day is typically 2-3/10, respectively.  Pain worsens with sitting and side bending or twisting and improves with standing, walking, bending forward, and bending backwards. His pain does not interfere with ADLs. The patient denies weakness, numbness, or bladder/bowel incontinence.    Notes that he has been limited by low back pain and hip pain in the past.  An x-ray in 2017 showed signs of very mild hip OA.  He remembers seeing a doctor who mentioned that his pain could be related to OA.  He has not done dedicated physical therapy for this in the past.    The patient has not done physical therapy for this problem. Has done PT for his back and still does home exercise program     Patient has tried the following medications with varied success (current meds in bold):   Oral  naproxen PRN prior to physical activity primarily for back and hip pain    Medical history includes T1DM, HLD, HTN.    Psychological testing for pain as depression and pain commonly coexist and need to both be treated.     Opioid Risk Score: 0      Interpretation of Opioid Risk Score   Score 0-3 = Low risk of abuse. Do UDS at least once per year.  Score 4-7 = Moderate risk of abuse. Do UDS 1-4 times per year.  Score 8+ = High risk of abuse. Refer to specialist.    PHQ      1/20/2022     5:00 PM 1/23/2023     8:20 AM 2/17/2023     8:20 AM   Depression Screen (PHQ-2/PHQ-9)   PHQ-2 Total Score 0 0 0       Interpretation of PHQ-9 Total Score   Score Severity   1-4 No Depression   5-9 Mild Depression   10-14 Moderate Depression   15-19 Moderately Severe Depression   20-27 Severe Depression      Medical records review:  I reviewed the note from the referring provider Soren Awad M* including the note dated 1/23/23.    ROS:   Red Flags ROS:   Fever, Chills, Sweats: Denies  Involuntary Weight Loss: Denies  Bladder Incontinence: Denies  Bowel Incontinence: denies  Saddle Anesthesia: Denies    All other systems reviewed and negative.     PMHx:   Past Medical History:   Diagnosis Date    HTN (hypertension)        PSHx:   History reviewed. No pertinent surgical history.    Family Hx:   Family History   Problem Relation Age of Onset    No Known Problems Mother     No Known Problems Father        Social Hx:  Social History     Socioeconomic History    Marital status:      Spouse name: Not on file    Number of children: Not on file    Years of education: Not on file    Highest education level: Bachelor's degree (e.g., BA, AB, BS)   Occupational History    Not on file   Tobacco Use    Smoking status: Never    Smokeless tobacco: Never   Vaping Use    Vaping Use: Never used   Substance and Sexual Activity    Alcohol use: Yes     Alcohol/week: 1.2 oz     Types: 2 Cans of beer per week    Drug use: Never    Sexual  activity: Yes     Partners: Female   Other Topics Concern    Not on file   Social History Narrative    Not on file     Social Determinants of Health     Financial Resource Strain: Not on file   Food Insecurity: Not on file   Transportation Needs: Not on file   Physical Activity: Not on file   Stress: Not on file   Social Connections: Not on file   Intimate Partner Violence: Not on file   Housing Stability: Not on file       Allergies:  No Known Allergies    Medications: reviewed on epic.   Outpatient Medications Marked as Taking for the 2/17/23 encounter (Office Visit) with Haley Herron M.D.   Medication Sig Dispense Refill    Insulin Pen Needle (PEN NEEDLES) 32G X 5 MM Misc Use to inject insulin 4x daily subq 100 Each 3    Insulin Degludec (TRESIBA FLEXTOUCH) 100 UNIT/ML Solution Pen-injector INJECT 47-50 UNITS SUBCUTANEOUSLY ONCE DAILY 15 mL 6    atorvastatin (LIPITOR) 10 MG Tab Take 1 Tablet by mouth every day. 90 Tablet 3    amLODIPine (NORVASC) 5 MG Tab Take 1 tablet by mouth once daily for 90 days 90 Tablet 3    insulin lispro (HUMALOG,ADMELOG) 100 UNIT/ML Solution Pen-injector injection PEN       Continuous Blood Gluc  (FREESTYLE DON 2 READER) Device Follow box instructions 1 Each 3    omeprazole (PRILOSEC) 40 MG delayed-release capsule Take 1 Capsule by mouth every day. 90 Capsule 3    tadalafil (CIALIS) 20 MG tablet Take 1 Tablet by mouth as needed for Erectile Dysfunction. 30 Tablet 3    Glucagon (BAQSIMI TWO PACK) 3 MG/DOSE Powder Administer 3 mg into affected nostril(S) one time as needed for up to 1 dose. 1 Each 3    Continuous Blood Gluc Sensor (FREESTYLE DON SENSOR SYSTEM) [facility] Follow instructions on the box 6 Each 3    Multiple Vitamin (DAILY-JEANNA) Tab Take 1 tablet by mouth every day.      glucosamine Sulfate 500 MG Cap Take 500 mg by mouth 3 times a day with meals. Once a day 1500 mg      aspirin (ASA) 81 MG Chew Tab chewable tablet Take 81 mg by mouth every day.      loratadine  (CLARITIN) 10 MG Tab Take 10 mg by mouth every day.          Current Outpatient Medications on File Prior to Visit   Medication Sig Dispense Refill    Insulin Pen Needle (PEN NEEDLES) 32G X 5 MM Misc Use to inject insulin 4x daily subq 100 Each 3    Insulin Degludec (TRESIBA FLEXTOUCH) 100 UNIT/ML Solution Pen-injector INJECT 47-50 UNITS SUBCUTANEOUSLY ONCE DAILY 15 mL 6    atorvastatin (LIPITOR) 10 MG Tab Take 1 Tablet by mouth every day. 90 Tablet 3    amLODIPine (NORVASC) 5 MG Tab Take 1 tablet by mouth once daily for 90 days 90 Tablet 3    insulin lispro (HUMALOG,ADMELOG) 100 UNIT/ML Solution Pen-injector injection PEN       Continuous Blood Gluc  (FREESTYLE DON 2 READER) Device Follow box instructions 1 Each 3    omeprazole (PRILOSEC) 40 MG delayed-release capsule Take 1 Capsule by mouth every day. 90 Capsule 3    tadalafil (CIALIS) 20 MG tablet Take 1 Tablet by mouth as needed for Erectile Dysfunction. 30 Tablet 3    Glucagon (BAQSIMI TWO PACK) 3 MG/DOSE Powder Administer 3 mg into affected nostril(S) one time as needed for up to 1 dose. 1 Each 3    Continuous Blood Gluc Sensor (FREESTYLE DON SENSOR SYSTEM) Misc Follow instructions on the box 6 Each 3    Multiple Vitamin (DAILY-JEANNA) Tab Take 1 tablet by mouth every day.      glucosamine Sulfate 500 MG Cap Take 500 mg by mouth 3 times a day with meals. Once a day 1500 mg      aspirin (ASA) 81 MG Chew Tab chewable tablet Take 81 mg by mouth every day.      loratadine (CLARITIN) 10 MG Tab Take 10 mg by mouth every day.       No current facility-administered medications on file prior to visit.         EXAMINATION     Physical Exam:   BP (!) 156/82 (BP Location: Right arm, Patient Position: Sitting, BP Cuff Size: Adult)   Pulse 64   Temp 36.2 °C (97.2 °F) (Temporal)   Ht 1.829 m (6')   Wt 79 kg (174 lb 2.6 oz)   SpO2 96%     Constitutional:   Body Habitus: Body mass index is 23.62 kg/m².  Cooperation: Fully cooperates with exam  Appearance:  Well-groomed, well-nourished.    Eyes: No scleral icterus to suggest severe liver disease, no proptosis to suggest severe hyperthyroidism    ENT -no obvious auditory deficits, no noticeable facial droop     Skin -no rashes or lesions noted     Respiratory-  breathing comfortably on room air, no audible wheezing    Cardiovascular-distal extremities warm and well perfused.  No lower extremity edema is noted.     Gastrointestinal - no obvious abdominal masses, non-distended    Psychiatric- alert and oriented ×3. Normal affect.     Gait - normal gait, no use of ambulatory device, nonantalgic.     Musculoskeletal and Neuro -     Thoracic/Lumbar Spine/Sacral Spine/Hips   Inspection: No evidence of atrophy in bilateral lower extremities throughout     Palpation:   Tenderness to palpation over the Right gluteus medius.  No tenderness to palpation elsewhere in the low back/hips including greater trochanters bilaterally and or left gluteus medius or quadriceps bilaterally.    Lumbar spine /hip provocative exam maneuvers  Straight leg raise negative bilaterally  FADIR test negative bilaterally    SI joint tests  GRACIE test on right reproduces pain at approximate location of right gluteus medius.  Negative on left  Thigh thrust test negative bilaterally      Key points for the international standards for neurological classification of spinal cord injury (ISNCSCI) to light touch.   Dermatome R L   L2 2 2   L3 2 2   L4 2 2   L5 2 2   S1 2 2   S2 2 2       Motor Exam Lower Extremities  ? Myotome R L   Hip flexion L2 5 5   Knee extension L3 5 5   Ankle dorsiflexion L4 5 5   Toe extension L5 5 5   Ankle plantarflexion S1 5 5       Reflexes  ?  R L   Patella  1+ 1+   Achilles   1+ 1+     Clonus of the ankle negative bilaterally       MEDICAL DECISION MAKING    Medical records review: see under HPI section.     DATA    Labs: Personally reviewed at today's visit:     Lab Results   Component Value Date/Time    SODIUM 137 04/06/2022  10:42 AM    POTASSIUM 4.9 04/06/2022 10:42 AM    CHLORIDE 104 04/06/2022 10:42 AM    CO2 25 04/06/2022 10:42 AM    ANION 8.0 04/06/2022 10:42 AM    GLUCOSE 303 (H) 04/06/2022 10:42 AM    BUN 26 (H) 04/06/2022 10:42 AM    CREATININE 0.98 04/06/2022 10:42 AM    CALCIUM 9.3 04/06/2022 10:42 AM    ASTSGOT 23 04/06/2022 10:42 AM    ALTSGPT 26 04/06/2022 10:42 AM    TBILIRUBIN 0.7 04/06/2022 10:42 AM    ALBUMIN 4.4 04/06/2022 10:42 AM    TOTPROTEIN 7.0 04/06/2022 10:42 AM    GLOBULIN 2.6 04/06/2022 10:42 AM    AGRATIO 1.7 04/06/2022 10:42 AM       No results found for: PROTHROMBTM, INR     Lab Results   Component Value Date/Time    WBC 7.4 01/20/2022 06:34 PM    RBC 4.89 01/20/2022 06:34 PM    HEMOGLOBIN 15.4 01/20/2022 06:34 PM    HEMATOCRIT 45.8 01/20/2022 06:34 PM    MCV 93.7 01/20/2022 06:34 PM    MCH 31.5 01/20/2022 06:34 PM    MCHC 33.6 (L) 01/20/2022 06:34 PM    MPV 10.2 01/20/2022 06:34 PM    NEUTSPOLYS 52.00 01/20/2022 06:34 PM    LYMPHOCYTES 37.30 01/20/2022 06:34 PM    MONOCYTES 8.10 01/20/2022 06:34 PM    EOSINOPHILS 1.90 01/20/2022 06:34 PM    BASOPHILS 0.40 01/20/2022 06:34 PM        Lab Results   Component Value Date/Time    HBA1C 7.4 (H) 01/23/2023 09:01 AM        Imaging:   I personally reviewed following images, these are my reads  No pertinent imaging available for review at the time of today's visit      IMAGING radiology reads. I reviewed the following radiology reads   XR hip 6/22/2017  AP pelvis and left lateral hip films were ordered performed and   interpreted by us today and reveal moderate heart osteoarthritic changes   in both hips with femoral head flattening noticed bilaterally with the   right mi reviewed left and sclerotic changes at the superior acetabular   dome bilaterally and early spur formation superolaterally consistent with   osteoarthritis.                               Diagnosis  Visit Diagnoses     ICD-10-CM   1. Neuropathy  G62.9   2. Tendinopathy of right gluteus medius   M67.951         ASSESSMENT AND PLAN:  Sukumar Tyson ( 1955) is a male avid skier with history of T1DM who presents with dull achy discomfort at his bilateral thighs and right great toe, and some pain at his lateral glute at approximate location of right gluteus medius with weakness of this muscle on exam.  Sukumar was seen today for new patient.    Diagnoses and all orders for this visit:    Neuropathy  -     Referral to Pain Clinic    Tendinopathy of right gluteus medius          PLAN  Physical Therapy: On exam today the pt exhibited weakness of the hip abductors.  Discussed that this is an important stabilizer of the lumbar spine and hips, and strengthening this muscle with physical therapy exercises (such as clamshells, side planks with hip abduction) should improve pain.  Could also consider monster walks and fire hydrants and squats with a resistance band around his thighs.    - Discussed that if his pain does not improve with home exercises that he can do with his wife who is a retired OT, then I would recommend a formal PT referral in the future.     Home Exercise Program: I provided the patient with a home exercise program focusing on strengthening and stretching.     Diagnostic workup: Personally reviewed at today's visit:  XR hip 2017 report, images unable to be viewed    Medications:   -Okay to continue oral naproxen as needed prior to activity    Interventions:   -None indicated at this time    Referrals:   -For EMG/NCS with me to assess for possible neuropathy.  Of note, I discussed that the results would likely not change his management at this time as he is not having significant numbness or tingling or pain or weakness.  He expressed understanding and stated that he would like to obtain more information about his current symptoms with EMG/NCS especially given his history of type 1 diabetes which I think is reasonable    Follow-up: Next available for EMG/NCS with me    Orders  Placed This Encounter    Referral to Pain Clinic       Haley Herron MD  Interventional Pain and Spine  Physical Medicine and Rehabilitation  [facility]    CC Soren Awad M*     The above note documents my personal evaluation of this patient. In addition, I have reviewed and confirmed with the patient and MA the supportive information documented in today's Patient Health Questionnaire and Office Note.     Please note that this dictation was created using voice recognition software. I have made every reasonable attempt to correct obvious errors, but I expect that there are errors of grammar and possibly content that I did not discover before finalizing the note.

## 2023-02-18 ENCOUNTER — OFFICE VISIT (OUTPATIENT)
Dept: URGENT CARE | Facility: PHYSICIAN GROUP | Age: 68
End: 2023-02-18
Payer: MEDICARE

## 2023-02-18 VITALS
BODY MASS INDEX: 24.39 KG/M2 | HEART RATE: 74 BPM | WEIGHT: 174.2 LBS | RESPIRATION RATE: 16 BRPM | OXYGEN SATURATION: 96 % | HEIGHT: 71 IN | DIASTOLIC BLOOD PRESSURE: 82 MMHG | SYSTOLIC BLOOD PRESSURE: 140 MMHG | TEMPERATURE: 97.3 F

## 2023-02-18 DIAGNOSIS — Z11.52 ENCOUNTER FOR SCREENING FOR COVID-19: ICD-10-CM

## 2023-02-18 DIAGNOSIS — U07.1 COVID: ICD-10-CM

## 2023-02-18 DIAGNOSIS — I10 ELEVATED BLOOD PRESSURE READING IN OFFICE WITH DIAGNOSIS OF HYPERTENSION: ICD-10-CM

## 2023-02-18 DIAGNOSIS — E10.9 TYPE 1 DIABETES MELLITUS WITHOUT COMPLICATION (HCC): ICD-10-CM

## 2023-02-18 LAB
FLUAV RNA SPEC QL NAA+PROBE: NEGATIVE
FLUBV RNA SPEC QL NAA+PROBE: NEGATIVE
RSV RNA SPEC QL NAA+PROBE: NEGATIVE
SARS-COV-2 RNA RESP QL NAA+PROBE: DETECTED

## 2023-02-18 PROCEDURE — 99214 OFFICE O/P EST MOD 30 MIN: CPT | Mod: CS | Performed by: NURSE PRACTITIONER

## 2023-02-18 PROCEDURE — 0241U POCT CEPHEID COV-2, FLU A/B, RSV - PCR: CPT | Mod: QW | Performed by: NURSE PRACTITIONER

## 2023-02-18 RX ORDER — FLURBIPROFEN SODIUM 0.3 MG/ML
SOLUTION/ DROPS OPHTHALMIC
COMMUNITY
Start: 2023-01-23 | End: 2023-07-24

## 2023-02-18 ASSESSMENT — FIBROSIS 4 INDEX: FIB4 SCORE: 1.29

## 2023-02-18 NOTE — PROGRESS NOTES
Subjective:   Sukumar Tyson is a 67 y.o. male who presents for Coronavirus Screening (Tested positive at home, needs lab test for work, fever, started with scratchy throat on Wednesday, Thursday congestion, been traveling, chest congestion, no SOB, nasal congestion, gets winded with walking, )       HPI  Patient presents for evaluation of 2-day history of fatigue, scratchy throat, and nasal congestion.  Patient states that he traveled via aircraft recently, at which time he thinks he became ill.  Patient took home COVID test yesterday, which resulted as being positive.  Patient discussed was with primary care, who recommend he be seen in urgent care for further evaluation.  Patient denies chest pain, chest pressure, shortness of breath.  Patient is COVID vaccinated.    ROS  All other systems are negative except as documented above within HPI.    MEDS:   Current Outpatient Medications:     B-D UF III MINI PEN NEEDLES 31G X 5 MM Misc, USE TO INJECT INSULIN 4 TIMES DAILY, Disp: , Rfl:     Insulin Pen Needle (PEN NEEDLES) 32G X 5 MM Misc, Use to inject insulin 4x daily subq, Disp: 100 Each, Rfl: 3    Insulin Degludec (TRESIBA FLEXTOUCH) 100 UNIT/ML Solution Pen-injector, INJECT 47-50 UNITS SUBCUTANEOUSLY ONCE DAILY, Disp: 15 mL, Rfl: 6    atorvastatin (LIPITOR) 10 MG Tab, Take 1 Tablet by mouth every day., Disp: 90 Tablet, Rfl: 3    amLODIPine (NORVASC) 5 MG Tab, Take 1 tablet by mouth once daily for 90 days, Disp: 90 Tablet, Rfl: 3    insulin lispro (HUMALOG,ADMELOG) 100 UNIT/ML Solution Pen-injector injection PEN, , Disp: , Rfl:     Continuous Blood Gluc  (FREESTYLE DON 2 READER) Device, Follow box instructions, Disp: 1 Each, Rfl: 3    omeprazole (PRILOSEC) 40 MG delayed-release capsule, Take 1 Capsule by mouth every day., Disp: 90 Capsule, Rfl: 3    tadalafil (CIALIS) 20 MG tablet, Take 1 Tablet by mouth as needed for Erectile Dysfunction., Disp: 30 Tablet, Rfl: 3    Glucagon (BAQSIMI TWO PACK) 3  "MG/DOSE Powder, Administer 3 mg into affected nostril(S) one time as needed for up to 1 dose., Disp: 1 Each, Rfl: 3    Continuous Blood Gluc Sensor (FREESTYLE DON SENSOR SYSTEM) [facility], Follow instructions on the box, Disp: 6 Each, Rfl: 3    Multiple Vitamin (DAILY-JEANNA) Tab, Take 1 tablet by mouth every day., Disp: , Rfl:     glucosamine Sulfate 500 MG Cap, Take 500 mg by mouth 3 times a day with meals. Once a day 1500 mg, Disp: , Rfl:     aspirin (ASA) 81 MG Chew Tab chewable tablet, Take 81 mg by mouth every day., Disp: , Rfl:     loratadine (CLARITIN) 10 MG Tab, Take 10 mg by mouth every day., Disp: , Rfl:   ALLERGIES: No Known Allergies    Patient's PMH, SocHx, SurgHx, FamHx, Drug allergies and medications were reviewed.     Objective:   BP (!) 140/82   Pulse 74   Temp 36.3 °C (97.3 °F) (Temporal)   Resp 16   Ht 1.803 m (5' 11\")   Wt 79 kg (174 lb 3.2 oz)   SpO2 96%   BMI 24.30 kg/m²     Physical Exam  Vitals and nursing note reviewed.   Constitutional:       General: He is awake.      Appearance: Normal appearance. He is well-developed and normal weight.   HENT:      Head: Normocephalic and atraumatic.      Right Ear: Tympanic membrane, ear canal and external ear normal.      Left Ear: Tympanic membrane, ear canal and external ear normal.      Nose: Nose normal.      Mouth/Throat:      Lips: Pink.      Mouth: Mucous membranes are moist.      Pharynx: Oropharynx is clear. Uvula midline.   Eyes:      Extraocular Movements: Extraocular movements intact.      Conjunctiva/sclera: Conjunctivae normal.      Pupils: Pupils are equal, round, and reactive to light.   Neck:      Thyroid: No thyromegaly.      Trachea: Trachea normal.   Cardiovascular:      Rate and Rhythm: Normal rate and regular rhythm.      Pulses: Normal pulses.      Heart sounds: Normal heart sounds, S1 normal and S2 normal.   Pulmonary:      Effort: Pulmonary effort is normal. No respiratory distress.      Breath sounds: Normal breath sounds. " No wheezing, rhonchi or rales.   Abdominal:      General: Bowel sounds are normal.      Palpations: Abdomen is soft.   Musculoskeletal:         General: Normal range of motion.      Cervical back: Full passive range of motion without pain, normal range of motion and neck supple.   Lymphadenopathy:      Cervical: No cervical adenopathy.   Skin:     General: Skin is warm and dry.      Capillary Refill: Capillary refill takes less than 2 seconds.   Neurological:      General: No focal deficit present.      Mental Status: He is alert and oriented to person, place, and time.      Gait: Gait is intact.   Psychiatric:         Attention and Perception: Attention and perception normal.         Mood and Affect: Mood normal.         Speech: Speech normal.         Behavior: Behavior normal. Behavior is cooperative.         Thought Content: Thought content normal.         Judgment: Judgment normal.       Assessment/Plan:   Assessment    1. COVID  - Nirmatrelvir&Ritonavir 300/100 20 x 150 MG & 10 x 100MG Tablet Therapy Pack; Take 300 mg nirmatrelvir (two 150 mg tablets) with 100 mg ritonavir (one 100 mg tablet) by mouth, with all three tablets taken together twice daily for 5 days.  Dispense: 30 Each; Refill: 0    2. Encounter for screening for COVID-19  - POCT Cepheid CoV-2, Flu A/B, RSV - PCR    3. Elevated blood pressure reading in office with diagnosis of hypertension    4. Type 1 diabetes mellitus without complication (HCC)        Vital signs stable at today's acute urgent care visit.  Reviewed test results completed in clinic.  Begin Paxlovid, he is high risk due to his PMH.  Advised to home isolate per CDC guidelines.  Supportive care options also discussed, to include alternating Tylenol and Advil, cough/cold/flu OTC medications for symptomatic relief, in addition to rest, fluids as tolerated. Differential diagnosis, natural history, and indications for immediate follow-up were discussed.     Advised the patient to  follow-up with the primary care provider for recheck, reevaluation, and/or consideration of further management if necessary. Return to urgent care with any worsening symptoms or if there is no improvement in their current condition. Red flags discussed and indications to immediately call 911 or present to the Emergency Department.  All questions were encouraged and answered to the patient's satisfaction and understanding, and they agree to the plan of care.     I personally reviewed prior external notes and test results pertinent to today's visit.  I have independently reviewed and interpreted all diagnostics ordered during this urgent care acute visit. Time spent evaluating this patient was a greater than 30 minutes and includes preparing for visit, counseling/education, exam and evaluation, obtaining history, independent interpretation and ordering lab/test/procedures.      Please note that this dictation was created using voice recognition software. I have made a reasonable attempt to correct obvious errors, but I expect that there are errors of grammar and possibly content that I did not discover before finalizing the note.

## 2023-03-24 ENCOUNTER — OFFICE VISIT (OUTPATIENT)
Dept: PHYSICAL MEDICINE AND REHAB | Facility: MEDICAL CENTER | Age: 68
End: 2023-03-24
Payer: MEDICARE

## 2023-03-24 VITALS
TEMPERATURE: 97 F | HEIGHT: 72 IN | HEART RATE: 54 BPM | DIASTOLIC BLOOD PRESSURE: 78 MMHG | WEIGHT: 172.62 LBS | OXYGEN SATURATION: 97 % | BODY MASS INDEX: 23.38 KG/M2 | SYSTOLIC BLOOD PRESSURE: 158 MMHG

## 2023-03-24 DIAGNOSIS — M62.81 SUBJECTIVE MUSCLE WEAKNESS: ICD-10-CM

## 2023-03-24 PROCEDURE — 95912 NRV CNDJ TEST 11-12 STUDIES: CPT | Performed by: STUDENT IN AN ORGANIZED HEALTH CARE EDUCATION/TRAINING PROGRAM

## 2023-03-24 ASSESSMENT — PATIENT HEALTH QUESTIONNAIRE - PHQ9: CLINICAL INTERPRETATION OF PHQ2 SCORE: 0

## 2023-03-24 ASSESSMENT — FIBROSIS 4 INDEX: FIB4 SCORE: 1.29

## 2023-03-24 ASSESSMENT — PAIN SCALES - GENERAL: PAINLEVEL: 3=SLIGHT PAIN

## 2023-03-29 ENCOUNTER — TELEPHONE (OUTPATIENT)
Dept: HEALTH INFORMATION MANAGEMENT | Facility: OTHER | Age: 68
End: 2023-03-29

## 2023-04-04 NOTE — PROCEDURES
"  Test Date:  3/24/2023    Patient: Darrius Davidson : 1955 Physician: Haley Herron MD   Sex: Male Height: 6' 0\" Ref Phys: Haley Herron MD   ID#: [id number] Weight: 78.3 kg Technician:      Patient Complaints:  dull constant discomfort in upper thighs for a couple of months and left big toe for several years. Not limiting in activities    Patient History / Exam:  67 year old male with hx T1DM    Gait - normal gait, no use of ambulatory device, nonantalgic.      Musculoskeletal and Neuro -      Thoracic/Lumbar Spine/Sacral Spine/Hips   Inspection: No evidence of atrophy in bilateral lower extremities throughout      Palpation:   Tenderness to palpation over the Right gluteus medius.  No tenderness to palpation elsewhere in the low back/hips including greater trochanters bilaterally and or left gluteus medius or quadriceps bilaterally.     Lumbar spine /hip provocative exam maneuvers  Straight leg raise negative bilaterally  FADIR test negative bilaterally     SI joint tests  GRACIE test on right reproduces pain at approximate location of right gluteus medius.  Negative on left  Thigh thrust test negative bilaterally        Key points for the international standards for neurological classification of spinal cord injury (ISNCSCI) to light touch.   Dermatome R L   L2 2 2   L3 2 2   L4 2 2   L5 2 2   S1 2 2   S2 2 2         Motor Exam Lower Extremities  ? Myotome R L   Hip flexion L2 5 5   Knee extension L3 5 5   Ankle dorsiflexion L4 5 5   Toe extension L5 5 5   Ankle plantarflexion S1 5 5         Reflexes  ?   R L   Patella   1+ 1+   Achilles    1+ 1+      Clonus of the ankle negative bilaterally        NCV & EMG Findings:  When corrected for temperature, all nerves (as indicated in the following tables) were within normal limits.  All left vs. right side differences were within normal limits.      Temp 78 on right, 80 on left. For motor and sensory distal latency, a temperature correction of 0.2ms/degree Celsius was " used, to a standard skin temperature of 32 degrees C. For motor and sensory NCV, a temperature correction of 1.4ms/degree Celsius was used, to a standard skin temperature of 32 degrees C.    Impression:  This is a normal electrodiagnostic study.  There is no electrodiagnostic evidence of a generalized peripheral polyneuropathy, right median or ulnar mononeuropathy, sural mononeuropathy on either side, tibial mononeuropathy on either side, fibular mononeuropathy on either side, or lumbar plexopathy on either side.     Recommendations:  Findings discussed with patient who expressed understanding  Patient to f/u with PCP      ___________________________  Haley Herron MD  Physical Medicine and Rehabilitation  [facility]          Nerve Conduction Studies  Anti Sensory Summary Table     Stim Site NR Peak (ms) Norm Peak (ms) P-T Amp (µV) Norm P-T Amp Site1 Site2 Delta-P (ms) Dist (cm) Nikolay (m/s) Norm Nikolay (m/s)   Right Median Anti Sensory (2nd Digit)   Wrist    2.7 <3.6 37.8 >10 Wrist 2nd Digit 3.5 14.0 40 >39   Left Sural Anti Sensory (Lat Mall)   Calf    3.0 <4.0 5.1 >5.0 Calf Lat Mall 4.0 14.0 35 >35   Right Sural Anti Sensory (Lat Mall)   Calf    2.8 <4.0 18.8 >5.0 Calf Lat Mall 3.6 14.0 39 >35   Right Ulnar Anti Sensory (5th Digit)   Wrist    3.5 <3.7 81.3 >15.0 Wrist 5th Digit 3.5 14.0 40 >38     Ortho Sensory Summary Table     Stim Site NR Peak (ms) Norm Peak (ms) P-T Amp (µV) Norm P-T Amp Site1 Site2 Delta-P (ms) Dist (cm) Nikolay (m/s) Norm Nikolay (m/s)   Left Medial Plantar Ortho Sensory (Med Malleolus)   Digit 1    1.1  5.2  Digit 1 Med Malleolus 1.1 0.0     Right Medial Plantar Ortho Sensory (Med Malleolus)   Digit 1    1.9  9.8  Digit 1 Med Malleolus 1.9 0.0       Motor Summary Table     Stim Site NR Onset (ms) Norm Onset (ms) O-P Amp (mV) Norm O-P Amp Site1 Site2 Delta-0 (ms) Dist (cm) Nikolay (m/s) Norm Nikolay (m/s)   Left Fibular Motor (Ext Dig Brev)   Ankle    5.4 <6.1 2.5 >2.5 B Fib Ankle 7.2 31.0 41 >38   B  Fib    13.6  2.2  Poplt B Fib 2.4 13.0 65 >40   Poplt    16.0  2.2          Right Fibular Motor (Ext Dig Brev)   Ankle    5.4 <6.1 2.5 >2.5 B Fib Ankle 7.6 31.0 41 >38   B Fib    13.8  2.2  Poplt B Fib 2.0 13.0 65 >40   Poplt    15.8  2.5          Right Median Motor (Abd Poll Brev)   Wrist    3.8 <4.2 11.0 >5 Elbow Wrist 5.0   >50   Elbow    8.8  10.4          Left Tibial Motor (Abd Sutton Brev)   Ankle    5.4 <6.1 7.7 >3.0 Knee Ankle 11.2 43.0 38 >35   Knee    16.6  4.4          Right Tibial Motor (Abd Sutton Brev)   Ankle    3.7 <6.1 5.5 >3.0 Knee Ankle 10.7 43.0 38 >35   Knee    15.2  4.2          Right Ulnar Motor (Abd Dig Min)   Wrist    2.8 <4.2 6.7 >3 B Elbow Wrist 4.0 22.0 55 >53   B Elbow    6.8  6.4  A Elbow B Elbow 1.9 13.0 68 >53   A Elbow    8.7  6.3            Waveforms:

## 2023-04-10 ENCOUNTER — OFFICE VISIT (OUTPATIENT)
Dept: MEDICAL GROUP | Facility: PHYSICIAN GROUP | Age: 68
End: 2023-04-10
Payer: MEDICARE

## 2023-04-10 VITALS
HEART RATE: 63 BPM | RESPIRATION RATE: 16 BRPM | SYSTOLIC BLOOD PRESSURE: 108 MMHG | BODY MASS INDEX: 23.03 KG/M2 | TEMPERATURE: 97.9 F | DIASTOLIC BLOOD PRESSURE: 70 MMHG | WEIGHT: 170 LBS | OXYGEN SATURATION: 96 % | HEIGHT: 72 IN

## 2023-04-10 DIAGNOSIS — J04.0 LARYNGITIS: ICD-10-CM

## 2023-04-10 PROCEDURE — 99213 OFFICE O/P EST LOW 20 MIN: CPT | Performed by: FAMILY MEDICINE

## 2023-04-10 RX ORDER — AZITHROMYCIN 250 MG/1
TABLET, FILM COATED ORAL
Qty: 6 TABLET | Refills: 0 | Status: SHIPPED | OUTPATIENT
Start: 2023-04-10

## 2023-04-10 ASSESSMENT — FIBROSIS 4 INDEX: FIB4 SCORE: 1.29

## 2023-04-10 NOTE — ASSESSMENT & PLAN NOTE
New problem  Has been having symptoms for the past 7 days or more  Having some cough  Having some chest congestion  No hx of asthma  Having productive green/grey sputum  Denies SOB

## 2023-04-10 NOTE — PROGRESS NOTES
Subjective:     Chief Complaint   Patient presents with    Cough     Congestion X7 days        HPI:   Sukumar presents today to discuss the following.    Laryngitis  New problem  Has been having symptoms for the past 7 days or more  Having some cough  Having some chest congestion  No hx of asthma  Having productive green/grey sputum  Denies SOB    Past Medical History:   Diagnosis Date    HTN (hypertension)        Current Outpatient Medications Ordered in Epic   Medication Sig Dispense Refill    azithromycin (ZITHROMAX) 250 MG Tab Take 2 tabs day one, and then 1 tab daily for remaining days 6 Tablet 0    B-D UF III MINI PEN NEEDLES 31G X 5 MM Misc USE TO INJECT INSULIN 4 TIMES DAILY      Insulin Pen Needle (PEN NEEDLES) 32G X 5 MM Misc Use to inject insulin 4x daily subq 100 Each 3    Insulin Degludec (TRESIBA FLEXTOUCH) 100 UNIT/ML Solution Pen-injector INJECT 47-50 UNITS SUBCUTANEOUSLY ONCE DAILY 15 mL 6    atorvastatin (LIPITOR) 10 MG Tab Take 1 Tablet by mouth every day. 90 Tablet 3    amLODIPine (NORVASC) 5 MG Tab Take 1 tablet by mouth once daily for 90 days 90 Tablet 3    insulin lispro (HUMALOG,ADMELOG) 100 UNIT/ML Solution Pen-injector injection PEN       Continuous Blood Gluc  (FREESTYLE DON 2 READER) Device Follow box instructions 1 Each 3    omeprazole (PRILOSEC) 40 MG delayed-release capsule Take 1 Capsule by mouth every day. 90 Capsule 3    tadalafil (CIALIS) 20 MG tablet Take 1 Tablet by mouth as needed for Erectile Dysfunction. 30 Tablet 3    Glucagon (BAQSIMI TWO PACK) 3 MG/DOSE Powder Administer 3 mg into affected nostril(S) one time as needed for up to 1 dose. 1 Each 3    Continuous Blood Gluc Sensor (FREESTYLE DON SENSOR SYSTEM) Misc Follow instructions on the box 6 Each 3    Multiple Vitamin (DAILY-JEANNA) Tab Take 1 tablet by mouth every day.      glucosamine Sulfate 500 MG Cap Take 500 mg by mouth 3 times a day with meals. Once a day 1500 mg      aspirin (ASA) 81 MG Chew Tab chewable  tablet Take 81 mg by mouth every day.      loratadine (CLARITIN) 10 MG Tab Take 10 mg by mouth every day.       No current [facility]-ordered facility-administered medications on file.       Allergies:  Patient has no known allergies.    Health Maintenance: Completed    ROS:  Gen: no fevers/chills, no changes in weight  Eyes: no changes in vision  Pulm: no sob, no cough  CV: no chest pain, no palpitations  GI: no nausea/vomiting, no diarrhea      Objective:     Exam:  /70 (BP Location: Left arm, Patient Position: Sitting, BP Cuff Size: Adult)   Pulse 63   Temp 36.6 °C (97.9 °F) (Temporal)   Resp 16   Ht 1.829 m (6')   Wt 77.1 kg (170 lb)   SpO2 96%   BMI 23.06 kg/m²  Body mass index is 23.06 kg/m².    Gen: Alert and oriented, No apparent distress.  HENT: TMs are clear. Oropharynx is w/o erythema or exudates. Neck is supple without cervical lymphadenopathy. No JVD.   Eyes: PERRLA  Lungs: Normal effort, CTA bilaterally, no wheezes, rhonchi, or rales  CV: Regular rate and rhythm. No murmurs, rubs, or gallops.  Psych: AAOx3      Assessment & Plan:     67 y.o. male with the following -     1. Laryngitis  New problem.  Patient presents with symptoms most consistent with laryngitis.  Symptoms have been persistent for greater than 7 days and not getting better.  As such patient amenable to azithromycin with return precautions.  - azithromycin (ZITHROMAX) 250 MG Tab; Take 2 tabs day one, and then 1 tab daily for remaining days  Dispense: 6 Tablet; Refill: 0      No follow-ups on file.          Please note that this dictation was created using voice recognition software. I have made every reasonable attempt to correct obvious errors, but I expect that there are errors of grammar and possibly content that I did not discover before finalizing the note.

## 2023-05-01 DIAGNOSIS — K21.9 GASTROESOPHAGEAL REFLUX DISEASE WITHOUT ESOPHAGITIS: ICD-10-CM

## 2023-05-01 RX ORDER — OMEPRAZOLE 40 MG/1
CAPSULE, DELAYED RELEASE ORAL
Qty: 90 CAPSULE | Refills: 0 | Status: SHIPPED | OUTPATIENT
Start: 2023-05-01 | End: 2023-07-24 | Stop reason: SDUPTHER

## 2023-05-02 ENCOUNTER — TELEPHONE (OUTPATIENT)
Dept: MEDICAL GROUP | Facility: PHYSICIAN GROUP | Age: 68
End: 2023-05-02
Payer: MEDICARE

## 2023-05-02 NOTE — TELEPHONE ENCOUNTER
"VOICEMAIL  1. Caller Name: FERMIN                       Call Back Number: [phone]     2. Message: Pt left vm stating PCP refilled Omeprazole however refused Amlodipine pt states he is running low and he takes this medication everyday.     3. Patient approves office to leave a detailed voicemail/MyChart message: N\A    Phone Number Called: [phone]     Call outcome: Left detailed message for patient. Informed to call back with any additional questions.    Message: Left vm for pt stating I would call with more information when I get it.     I see it was refused as \"never under provider care\" can you explain more reasoning so I can explain to patient. Thank you      "

## 2023-05-05 RX ORDER — AMLODIPINE BESYLATE 5 MG/1
5 TABLET ORAL DAILY
Qty: 90 TABLET | Refills: 1 | Status: SHIPPED | OUTPATIENT
Start: 2023-05-05 | End: 2023-11-13 | Stop reason: SDUPTHER

## 2023-05-11 ENCOUNTER — PATIENT MESSAGE (OUTPATIENT)
Dept: MEDICAL GROUP | Facility: PHYSICIAN GROUP | Age: 68
End: 2023-05-11
Payer: MEDICARE

## 2023-05-15 ENCOUNTER — TELEPHONE (OUTPATIENT)
Dept: PHYSICAL MEDICINE AND REHAB | Facility: MEDICAL CENTER | Age: 68
End: 2023-05-15

## 2023-05-15 ENCOUNTER — OFFICE VISIT (OUTPATIENT)
Dept: PHYSICAL MEDICINE AND REHAB | Facility: MEDICAL CENTER | Age: 68
End: 2023-05-15
Payer: MEDICARE

## 2023-05-15 VITALS
RESPIRATION RATE: 15 BRPM | HEIGHT: 72 IN | DIASTOLIC BLOOD PRESSURE: 56 MMHG | SYSTOLIC BLOOD PRESSURE: 118 MMHG | HEART RATE: 78 BPM | WEIGHT: 168.65 LBS | OXYGEN SATURATION: 98 % | BODY MASS INDEX: 22.84 KG/M2 | TEMPERATURE: 98.1 F

## 2023-05-15 DIAGNOSIS — M79.10 MYALGIA: ICD-10-CM

## 2023-05-15 DIAGNOSIS — G89.29 CHRONIC BILATERAL LOW BACK PAIN WITHOUT SCIATICA: ICD-10-CM

## 2023-05-15 DIAGNOSIS — M54.50 CHRONIC BILATERAL LOW BACK PAIN WITHOUT SCIATICA: ICD-10-CM

## 2023-05-15 DIAGNOSIS — G89.29 CHRONIC PAIN OF BOTH HIPS: ICD-10-CM

## 2023-05-15 DIAGNOSIS — M25.551 CHRONIC PAIN OF BOTH HIPS: ICD-10-CM

## 2023-05-15 DIAGNOSIS — M54.50 ACUTE BILATERAL LOW BACK PAIN WITHOUT SCIATICA: ICD-10-CM

## 2023-05-15 DIAGNOSIS — M25.552 CHRONIC PAIN OF BOTH HIPS: ICD-10-CM

## 2023-05-15 DIAGNOSIS — M16.10 ARTHRITIS OF HIP: ICD-10-CM

## 2023-05-15 PROCEDURE — 1125F AMNT PAIN NOTED PAIN PRSNT: CPT | Performed by: PHYSICAL MEDICINE & REHABILITATION

## 2023-05-15 PROCEDURE — 99214 OFFICE O/P EST MOD 30 MIN: CPT | Mod: 25 | Performed by: PHYSICAL MEDICINE & REHABILITATION

## 2023-05-15 PROCEDURE — 76942 ECHO GUIDE FOR BIOPSY: CPT | Performed by: PHYSICAL MEDICINE & REHABILITATION

## 2023-05-15 PROCEDURE — 3074F SYST BP LT 130 MM HG: CPT | Performed by: PHYSICAL MEDICINE & REHABILITATION

## 2023-05-15 PROCEDURE — 20553 NJX 1/MLT TRIGGER POINTS 3/>: CPT | Performed by: PHYSICAL MEDICINE & REHABILITATION

## 2023-05-15 PROCEDURE — 3078F DIAST BP <80 MM HG: CPT | Performed by: PHYSICAL MEDICINE & REHABILITATION

## 2023-05-15 RX ORDER — DEXAMETHASONE SODIUM PHOSPHATE 4 MG/ML
4 INJECTION, SOLUTION INTRA-ARTICULAR; INTRALESIONAL; INTRAMUSCULAR; INTRAVENOUS; SOFT TISSUE ONCE
Status: COMPLETED | OUTPATIENT
Start: 2023-05-15 | End: 2023-05-15

## 2023-05-15 RX ORDER — TIZANIDINE 4 MG/1
4 TABLET ORAL NIGHTLY PRN
Qty: 30 TABLET | Refills: 0 | Status: SHIPPED | OUTPATIENT
Start: 2023-05-15 | End: 2023-06-14

## 2023-05-15 RX ORDER — MELOXICAM 15 MG/1
15 TABLET ORAL
Qty: 30 TABLET | Refills: 0 | Status: SHIPPED | OUTPATIENT
Start: 2023-05-15 | End: 2023-06-14

## 2023-05-15 RX ADMIN — DEXAMETHASONE SODIUM PHOSPHATE 4 MG: 4 INJECTION, SOLUTION INTRA-ARTICULAR; INTRALESIONAL; INTRAMUSCULAR; INTRAVENOUS; SOFT TISSUE at 15:24

## 2023-05-15 ASSESSMENT — PATIENT HEALTH QUESTIONNAIRE - PHQ9: CLINICAL INTERPRETATION OF PHQ2 SCORE: 0

## 2023-05-15 ASSESSMENT — PAIN SCALES - GENERAL: PAINLEVEL: 8=MODERATE-SEVERE PAIN

## 2023-05-15 ASSESSMENT — FIBROSIS 4 INDEX: FIB4 SCORE: 1.31

## 2023-05-15 NOTE — PROGRESS NOTES
Follow up patient note  Interventional spine and Pain  Physiatry (physical medicine and  Rehabilitation)       Chief complaint:   Chief Complaint   Patient presents with    Follow-Up     Muscle weakness, neuropathy          HISTORY    Please see new patient note by Dr Watters,  for more details.     HPI  Patient identification: Sukumar Tyson ,  1955,   With Diagnoses of Myalgia, Acute bilateral low back pain without sciatica, Chronic bilateral low back pain without sciatica, Chronic pain of both hips, and Arthritis of hip were pertinent to this visit.     This is a patient of Dr. Haley Herron.  Dr Herron was off today.  The patient is going on an international traveling trip tomorrow and asked to be seen today.  We expedited the patient's appointment and saw him today.      The patient was golfing two weeks ago and had acute on chronic exacerbation of his low back pain. This is bilateral, aching in quality, 8/10 in intensity worse with standing. He tried stretching, walking but this is causing additional pain. He denies radiating pain. He denies            ROS Red Flags :   Fever, Chills, Sweats: Denies  Involuntary Weight Loss: Denies  Bowel/Bladder Incontinence: Denies  Saddle Anesthesia: Denies        PMHx:   Past Medical History:   Diagnosis Date    HTN (hypertension)        PSHx:   History reviewed. No pertinent surgical history.    Family history   Family History   Problem Relation Age of Onset    No Known Problems Mother     No Known Problems Father          Medications:   Outpatient Medications Marked as Taking for the 5/15/23 encounter (Office Visit) with Deuce Watters M.D.   Medication Sig Dispense Refill    Fexofenadine HCl (ALLEGRA PO) Take  by mouth.      meloxicam (MOBIC) 15 MG tablet Take 1 Tablet by mouth 1 time a day as needed for Moderate Pain or Severe Pain for up to 30 days. Take with food. Do not take with other NSAIDs 30 Tablet 0    tizanidine (ZANAFLEX) 4 MG Tab Take 1 Tablet by  mouth at bedtime as needed (muscle spasms) for up to 30 days. 30 Tablet 0    Continuous Blood Gluc Sensor (FREESTYLE DON 2 SENSOR) [facility] [place] Follow-up box instructions 2 Each 1    amLODIPine (NORVASC) 5 MG Tab Take 1 Tablet by mouth every day. 90 Tablet 1    omeprazole (PRILOSEC) 40 MG delayed-release capsule Take 1 capsule by mouth once daily 90 Capsule 0    B-D UF III MINI PEN NEEDLES 31G X 5 MM Misc USE TO INJECT INSULIN 4 TIMES DAILY      Insulin Pen Needle (PEN NEEDLES) 32G X 5 MM Misc Use to inject insulin 4x daily subq 100 Each 3    Insulin Degludec (TRESIBA FLEXTOUCH) 100 UNIT/ML Solution Pen-injector INJECT 47-50 UNITS SUBCUTANEOUSLY ONCE DAILY 15 mL 6    atorvastatin (LIPITOR) 10 MG Tab Take 1 Tablet by mouth every day. 90 Tablet 3    insulin lispro (HUMALOG,ADMELOG) 100 UNIT/ML Solution Pen-injector injection PEN       Continuous Blood Gluc  (FREESTYLE DON 2 READER) Device Follow box instructions 1 Each 3    tadalafil (CIALIS) 20 MG tablet Take 1 Tablet by mouth as needed for Erectile Dysfunction. 30 Tablet 3    Glucagon (BAQSIMI TWO PACK) 3 MG/DOSE Powder Administer 3 mg into affected nostril(S) one time as needed for up to 1 dose. 1 Each 3    Continuous Blood Gluc Sensor (FREESTYLE DON SENSOR SYSTEM) [facility] [place] Follow instructions on the box 6 Each 3    Multiple Vitamin (DAILY-JEANNA) Tab Take 1 tablet by mouth every day.      glucosamine Sulfate 500 MG Cap Take 500 mg by mouth 3 times a day with meals. Once a day 1500 mg      aspirin (ASA) 81 MG Chew Tab chewable tablet Take 81 mg by mouth every day.          Current Outpatient Medications on File Prior to Visit   Medication Sig Dispense Refill    Fexofenadine HCl (ALLEGRA PO) Take  by mouth.      Continuous Blood Gluc Sensor (FREESTYLE DON 2 SENSOR) [facility] [place] Follow-up box instructions 2 Each 1    amLODIPine (NORVASC) 5 MG Tab Take 1 Tablet by mouth every day. 90 Tablet 1    omeprazole (PRILOSEC) 40 MG delayed-release capsule Take 1 capsule by mouth  once daily 90 Capsule 0    B-D UF III MINI PEN NEEDLES 31G X 5 MM Misc USE TO INJECT INSULIN 4 TIMES DAILY      Insulin Pen Needle (PEN NEEDLES) 32G X 5 MM Misc Use to inject insulin 4x daily subq 100 Each 3    Insulin Degludec (TRESIBA FLEXTOUCH) 100 UNIT/ML Solution Pen-injector INJECT 47-50 UNITS SUBCUTANEOUSLY ONCE DAILY 15 mL 6    atorvastatin (LIPITOR) 10 MG Tab Take 1 Tablet by mouth every day. 90 Tablet 3    insulin lispro (HUMALOG,ADMELOG) 100 UNIT/ML Solution Pen-injector injection PEN       Continuous Blood Gluc  (FREESTYLE DON 2 READER) Device Follow box instructions 1 Each 3    tadalafil (CIALIS) 20 MG tablet Take 1 Tablet by mouth as needed for Erectile Dysfunction. 30 Tablet 3    Glucagon (BAQSIMI TWO PACK) 3 MG/DOSE Powder Administer 3 mg into affected nostril(S) one time as needed for up to 1 dose. 1 Each 3    Continuous Blood Gluc Sensor (FREESTYLE DON SENSOR SYSTEM) Misc Follow instructions on the box 6 Each 3    Multiple Vitamin (DAILY-JEANNA) Tab Take 1 tablet by mouth every day.      glucosamine Sulfate 500 MG Cap Take 500 mg by mouth 3 times a day with meals. Once a day 1500 mg      aspirin (ASA) 81 MG Chew Tab chewable tablet Take 81 mg by mouth every day.      azithromycin (ZITHROMAX) 250 MG Tab Take 2 tabs day one, and then 1 tab daily for remaining days (Patient not taking: Reported on 5/15/2023) 6 Tablet 0    loratadine (CLARITIN) 10 MG Tab Take 10 mg by mouth every day. (Patient not taking: Reported on 5/15/2023)       No current facility-administered medications on file prior to visit.         Allergies:   No Known Allergies    Social Hx:   Social History     Socioeconomic History    Marital status:      Spouse name: Not on file    Number of children: Not on file    Years of education: Not on file    Highest education level: Bachelor's degree (e.g., BA, AB, BS)   Occupational History    Not on file   Tobacco Use    Smoking status: Never    Smokeless tobacco: Never    Vaping Use    Vaping Use: Never used   Substance and Sexual Activity    Alcohol use: Yes     Alcohol/week: 1.2 oz     Types: 2 Cans of beer per week    Drug use: Never    Sexual activity: Yes     Partners: Female   Other Topics Concern    Not on file   Social History Narrative    Not on file     Social Determinants of Health     Financial Resource Strain: Low Risk  (4/26/2021)    Overall Financial Resource Strain (CARDIA)     Difficulty of Paying Living Expenses: Not hard at all   Food Insecurity: No Food Insecurity (4/26/2021)    Hunger Vital Sign     Worried About Running Out of Food in the Last Year: Never true     Ran Out of Food in the Last Year: Never true   Transportation Needs: No Transportation Needs (4/26/2021)    PRAPARE - Transportation     Lack of Transportation (Medical): No     Lack of Transportation (Non-Medical): No   Physical Activity: Sufficiently Active (4/26/2021)    Exercise Vital Sign     Days of Exercise per Week: 7 days     Minutes of Exercise per Session: 50 min   Stress: No Stress Concern Present (4/26/2021)    [employer] [place] [employer] - Occupational Stress Questionnaire     Feeling of Stress : Not at all   Social Connections: Unknown (4/26/2021)    Social Connection and Isolation Panel [NHANES]     Frequency of Communication with Friends and Family: Twice a week     Frequency of Social Gatherings with Friends and Family: Three times a week     Attends [employer] Services: Patient refused     Active Member of Clubs or Organizations: Yes     Attends Club or Organization Meetings: More than 4 times per year     Marital Status:    Intimate Partner Violence: Not on file   Housing Stability: Low Risk  (4/26/2021)    Housing Stability Vital Sign     Unable to Pay for Housing in the Last Year: No     Number of Places Lived in the Last Year: 1     Unstable Housing in the Last Year: No         EXAMINATION     Physical Exam:   Vitals: /56 (BP Location: Right arm,  Patient Position: Sitting)   Pulse 78   Temp 36.7 °C (98.1 °F) (Temporal)   Resp 15   Ht 1.829 m (6')   Wt 76.5 kg (168 lb 10.4 oz)   SpO2 98%     Constitutional:   Body Habitus: Body mass index is 22.87 kg/m².  Cooperation: Fully cooperates with exam  Appearance: Well-groomed no disheveled    Respiratory-  breathing comfortable on room air, no audible wheezing  Cardiovascular- capillary refills less than 2 seconds. No lower extremity edema is noted.   Psychiatric- alert and oriented ×3. Normal affect.    MSK and Neuro: -    Thoracic/Lumbar Spine/Sacral Spine/Hips   There are no signs of infection around the injection sites.   decreased active range of motion with flexion, lateral flexion, and rotation bilaterally.   There is decreased active range of motion with lumbar extension.    There is  pain with lumbar extension.   There is pain with facet loading maneuver (extension rotation) with axial low back pain on the BILATERAL side(s)       Palpation:   No tenderness to palpation in midline at T1-T12 levels. No tenderness to palpation in the left and right of the midline T1-L5, POSITIVE for tenderness to palpation to the para-midline region in the lower lumbar levels with trigger points reproduce the patient's pain.  palpation over SI joint: negative bilaterally    palpation in hip or over the gluteus medius tendon insertion: negative bilaterally      Lumbar spine Special tests  Neuro tension  Straight leg test negative bilaterally    Slump test negative bilaterally    Hips  Decreased range of motion in the bilateral hips with FAIRs maneuver positive right worse than left.    Key points for the international standards for neurological classification of spinal cord injury (ISNCSCI) to light touch.     Dermatome R L                                      L2 2 2   L3 2 2   L4 2 2   L5 2 2   S1 2 2   S2 2 2         Motor Exam Lower Extremities    ? Myotome R L   Hip flexion L2 5 5   Knee extension L3 5 5   Ankle  dorsiflexion L4 5 5   Toe extension L5 5 5   Ankle plantarflexion S1 5 5           MEDICAL DECISION MAKING    DATA    Labs:   Lab Results   Component Value Date/Time    SODIUM 137 04/06/2022 10:42 AM    POTASSIUM 4.9 04/06/2022 10:42 AM    CHLORIDE 104 04/06/2022 10:42 AM    CO2 25 04/06/2022 10:42 AM    GLUCOSE 303 (H) 04/06/2022 10:42 AM    BUN 26 (H) 04/06/2022 10:42 AM    CREATININE 0.98 04/06/2022 10:42 AM        No results found for: PROTHROMBTM, INR     Lab Results   Component Value Date/Time    WBC 7.4 01/20/2022 06:34 PM    RBC 4.89 01/20/2022 06:34 PM    HEMOGLOBIN 15.4 01/20/2022 06:34 PM    HEMATOCRIT 45.8 01/20/2022 06:34 PM    MCV 93.7 01/20/2022 06:34 PM    MCH 31.5 01/20/2022 06:34 PM    MCHC 33.6 (L) 01/20/2022 06:34 PM    MPV 10.2 01/20/2022 06:34 PM    NEUTSPOLYS 52.00 01/20/2022 06:34 PM    LYMPHOCYTES 37.30 01/20/2022 06:34 PM    MONOCYTES 8.10 01/20/2022 06:34 PM    EOSINOPHILS 1.90 01/20/2022 06:34 PM    BASOPHILS 0.40 01/20/2022 06:34 PM        Lab Results   Component Value Date/Time    HBA1C 7.4 (H) 01/23/2023 09:01 AM          Imaging:   I personally reviewed following images            I reviewed the following radiology reports    X-ray hip 6/22/2017  AP pelvis and left lateral hip films were ordered performed and   interpreted by us today and reveal moderate heart osteoarthritic changes   in both hips with femoral head flattening noticed bilaterally with the   right mi reviewed left and sclerotic changes at the superior acetabular   dome bilaterally and early spur formation superolaterally consistent with   osteoarthritis.   Soumya Hernandez MD, 6/22/2017 8:17 PM                                                           DIAGNOSIS   Visit Diagnoses     ICD-10-CM   1. Myalgia  M79.10   2. Acute bilateral low back pain without sciatica  M54.50   3. Chronic bilateral low back pain without sciatica  M54.50    G89.29   4. Chronic pain of both hips  M25.551    M25.552    G89.29   5. Arthritis  of hip  M16.10         ASSESSMENT and PLAN:     Sukumar Tyson  1955 male      Sukumar was seen today for follow-up.    Diagnoses and all orders for this visit:    Myalgia  -     Consent for all Surgical, Special Diagnostic or Therapeutic Procedures  -     dexamethasone (DECADRON) injection 4 mg  -     meloxicam (MOBIC) 15 MG tablet; Take 1 Tablet by mouth 1 time a day as needed for Moderate Pain or Severe Pain for up to 30 days. Take with food. Do not take with other NSAIDs  -     tizanidine (ZANAFLEX) 4 MG Tab; Take 1 Tablet by mouth at bedtime as needed (muscle spasms) for up to 30 days.    Acute bilateral low back pain without sciatica  -     DX-LUMBAR SPINE-2 OR 3 VIEWS; Future  -     meloxicam (MOBIC) 15 MG tablet; Take 1 Tablet by mouth 1 time a day as needed for Moderate Pain or Severe Pain for up to 30 days. Take with food. Do not take with other NSAIDs  -     tizanidine (ZANAFLEX) 4 MG Tab; Take 1 Tablet by mouth at bedtime as needed (muscle spasms) for up to 30 days.    Chronic bilateral low back pain without sciatica  -     DX-LUMBAR SPINE-2 OR 3 VIEWS; Future  -     meloxicam (MOBIC) 15 MG tablet; Take 1 Tablet by mouth 1 time a day as needed for Moderate Pain or Severe Pain for up to 30 days. Take with food. Do not take with other NSAIDs  -     tizanidine (ZANAFLEX) 4 MG Tab; Take 1 Tablet by mouth at bedtime as needed (muscle spasms) for up to 30 days.    Chronic pain of both hips  -     DX-HIP-BILATERAL-WITH PELVIS-3/4 VIEWS; Future  -     meloxicam (MOBIC) 15 MG tablet; Take 1 Tablet by mouth 1 time a day as needed for Moderate Pain or Severe Pain for up to 30 days. Take with food. Do not take with other NSAIDs  -     tizanidine (ZANAFLEX) 4 MG Tab; Take 1 Tablet by mouth at bedtime as needed (muscle spasms) for up to 30 days.    Arthritis of hip  -     DX-HIP-BILATERAL-WITH PELVIS-3/4 VIEWS; Future  -     meloxicam (MOBIC) 15 MG tablet; Take 1 Tablet by mouth 1 time a day as needed  for Moderate Pain or Severe Pain for up to 30 days. Take with food. Do not take with other NSAIDs  -     tizanidine (ZANAFLEX) 4 MG Tab; Take 1 Tablet by mouth at bedtime as needed (muscle spasms) for up to 30 days.          We decided to proceed today with trigger point injection in the area of pain.    I also believe the patient is having pain from worsening of hip arthritis and likely facet mediated pain as well.  X-rays ordered of the hip and lumbar spine.    Follow up: 6/29/2023     Thank you for allowing me to participate in the care of this patient. If you have any questions please not hesitate to contact me.             Please note that this dictation was created using voice recognition software. I have made every reasonable attempt to correct obvious errors but there may be errors of grammar and content that I may have overlooked prior to finalization of this note.      Deuce Watters MD  Interventional Spine and Sports Physiatry  Physical Medicine and Rehabilitation  [facility]

## 2023-05-15 NOTE — TELEPHONE ENCOUNTER
Please offer an appointment today at 240pm.  Please let the patient know this will be a double booked appointment.  Please arrive early for paperwork.  There may be an additional wait time.    Dr. Watters
VOICEMAIL    1. Caller Name: Darrius Tyson                       Call Back Number:     2. Message: Pt is currently under the care of Dr. Herron. Is travelling internationally, leaving at 6am tomorrow morning (5/26). Is in a lot of pain and was wondering if Dr. Watters could get him in today, and if there is anything that can be done to relieve his pain. His vacation is going to consist of hiking.    3. Patient approves office to leave a detailed voicemail/MyChart message: yes  
Yes, OK for visit with me this afternoon at 1pm.     Dr Watters  
Car seat

## 2023-05-15 NOTE — PROCEDURES
Date of Service: 5/15/2023     Physician/s: Deuce Watters MD    Pre-operative Diagnosis: Myalgia (M79.1)    Post-operative Diagnosis: Myalgia (M79.1)    Procedure: Right upper lumbar paraspinous muscle, right lower lumbar paraspinous muscle, right quadratus lumborum muscle, left upper lumbar paraspinous muscle, left lower lumbar paraspinous muscle, left quadratus lumborum muscle trigger point injections    Description of procedure:    The risks, benefits, and alternatives of the procedure were reviewed and discussed with the patient.  Written informed consent was freely obtained. A pre-procedural time-out was conducted by the physician verifying patient’s identity, procedure to be performed, procedure site and side, and allergy verification. Appropriate equipment was determined to be in place for the procedure.     In the office suite exam room the patient was placed in a prone position and the skin areas for injection over the Right upper lumbar paraspinous muscle, right lower lumbar paraspinous muscle, right quadratus lumborum muscle, left upper lumbar paraspinous muscle, left lower lumbar paraspinous muscle, left quadratus lumborum muscle was marked. A solution was prepared with 1 mL of 4 mg/mL of dexamethasone, 5 mL of 1% lidocaine and 4 mL of sterile saline. The areas of pain was then prepped and draped in the usual sterile fashion. Ultrasound was confirmed to view the adjacent structures for blood vessels and nerves and to confirm the needle path. A 27g needle was placed into each of the markings at the areas above under ultrasound guidance with an in plane approach.. After negative aspiration, approximately a 1.5 mL of the above solution was injected. The needle was removed intact after each trigger point injection, and the patient's back was covered with a 4x4 gauze, the area was cleansed with sterile normal saline, and a dressing was applied. There were no complications noted. The images were uploaded to  our media tab for permanent storage.      Deuce Watters MD  Physical Medicine and Rehabilitation  Interventional Spine and Sports Physiatry  [facility]

## 2023-06-07 ENCOUNTER — HOSPITAL ENCOUNTER (OUTPATIENT)
Dept: RADIOLOGY | Facility: MEDICAL CENTER | Age: 68
End: 2023-06-07
Attending: PHYSICAL MEDICINE & REHABILITATION
Payer: MEDICARE

## 2023-06-07 DIAGNOSIS — G89.29 CHRONIC BILATERAL LOW BACK PAIN WITHOUT SCIATICA: ICD-10-CM

## 2023-06-07 DIAGNOSIS — M25.552 CHRONIC PAIN OF BOTH HIPS: ICD-10-CM

## 2023-06-07 DIAGNOSIS — M54.50 CHRONIC BILATERAL LOW BACK PAIN WITHOUT SCIATICA: ICD-10-CM

## 2023-06-07 DIAGNOSIS — G89.29 CHRONIC PAIN OF BOTH HIPS: ICD-10-CM

## 2023-06-07 DIAGNOSIS — M25.551 CHRONIC PAIN OF BOTH HIPS: ICD-10-CM

## 2023-06-07 DIAGNOSIS — M54.50 ACUTE BILATERAL LOW BACK PAIN WITHOUT SCIATICA: ICD-10-CM

## 2023-06-07 DIAGNOSIS — M16.10 ARTHRITIS OF HIP: ICD-10-CM

## 2023-06-07 PROCEDURE — 72100 X-RAY EXAM L-S SPINE 2/3 VWS: CPT

## 2023-06-07 PROCEDURE — 73522 X-RAY EXAM HIPS BI 3-4 VIEWS: CPT

## 2023-06-29 ENCOUNTER — TELEPHONE (OUTPATIENT)
Dept: PHYSICAL MEDICINE AND REHAB | Facility: MEDICAL CENTER | Age: 68
End: 2023-06-29

## 2023-06-29 ENCOUNTER — OFFICE VISIT (OUTPATIENT)
Dept: PHYSICAL MEDICINE AND REHAB | Facility: MEDICAL CENTER | Age: 68
End: 2023-06-29
Payer: MEDICARE

## 2023-06-29 VITALS
SYSTOLIC BLOOD PRESSURE: 144 MMHG | OXYGEN SATURATION: 96 % | WEIGHT: 166.89 LBS | DIASTOLIC BLOOD PRESSURE: 80 MMHG | BODY MASS INDEX: 22.6 KG/M2 | TEMPERATURE: 98 F | HEART RATE: 57 BPM | HEIGHT: 72 IN

## 2023-06-29 DIAGNOSIS — M54.50 CHRONIC BILATERAL LOW BACK PAIN WITHOUT SCIATICA: Primary | ICD-10-CM

## 2023-06-29 DIAGNOSIS — M16.10 ARTHRITIS OF HIP: ICD-10-CM

## 2023-06-29 DIAGNOSIS — M25.562 ACUTE PAIN OF LEFT KNEE: ICD-10-CM

## 2023-06-29 DIAGNOSIS — M67.951 TENDINOPATHY OF RIGHT GLUTEUS MEDIUS: ICD-10-CM

## 2023-06-29 DIAGNOSIS — G89.29 CHRONIC BILATERAL LOW BACK PAIN WITHOUT SCIATICA: Primary | ICD-10-CM

## 2023-06-29 DIAGNOSIS — G62.9 NEUROPATHY: ICD-10-CM

## 2023-06-29 DIAGNOSIS — M47.816 LUMBAR SPONDYLOSIS: ICD-10-CM

## 2023-06-29 DIAGNOSIS — M79.10 MYALGIA: ICD-10-CM

## 2023-06-29 PROCEDURE — 1125F AMNT PAIN NOTED PAIN PRSNT: CPT | Performed by: STUDENT IN AN ORGANIZED HEALTH CARE EDUCATION/TRAINING PROGRAM

## 2023-06-29 PROCEDURE — 99214 OFFICE O/P EST MOD 30 MIN: CPT | Performed by: STUDENT IN AN ORGANIZED HEALTH CARE EDUCATION/TRAINING PROGRAM

## 2023-06-29 PROCEDURE — 3077F SYST BP >= 140 MM HG: CPT | Performed by: STUDENT IN AN ORGANIZED HEALTH CARE EDUCATION/TRAINING PROGRAM

## 2023-06-29 PROCEDURE — 3079F DIAST BP 80-89 MM HG: CPT | Performed by: STUDENT IN AN ORGANIZED HEALTH CARE EDUCATION/TRAINING PROGRAM

## 2023-06-29 ASSESSMENT — PAIN SCALES - GENERAL: PAINLEVEL: 3=SLIGHT PAIN

## 2023-06-29 ASSESSMENT — PATIENT HEALTH QUESTIONNAIRE - PHQ9: CLINICAL INTERPRETATION OF PHQ2 SCORE: 0

## 2023-06-29 ASSESSMENT — FIBROSIS 4 INDEX: FIB4 SCORE: 1.31

## 2023-06-29 NOTE — PROGRESS NOTES
Follow up patient note  Interventional spine and Pain  Physiatry (physical medicine and  Rehabilitation)       Chief complaint:   Chief Complaint   Patient presents with    Follow-Up     Subjective muscle weakness          HISTORY (2/17/2023):  Sukumar Tyson is a 67 y.o. male who presents today with a dull constant discomfort in his upper thighs for a couple of months and left big toe for several years. This doesn't limit him from doing activities. He is still able to ski without difficulty or exacerbation of pain. He denies noticeable numbness/tingling or weakness in his legs. He notes a history of right sciatica but denies current symptoms of this.  He was referred for an EMG by his PCP. He remembers getting an EMG at least 20 years ago in [place] which was normal. Notes that his A1c has typically been in the 6% range but his most recent A1c was 7.4%. This is the highest it's been.      Pain right now is 2/10 on the numeric pain scale. His pain at its best-worse level during the course of the day is typically 2-3/10, respectively.  Pain worsens with sitting and side bending or twisting and improves with standing, walking, bending forward, and bending backwards. His pain does not interfere with ADLs. The patient denies weakness, numbness, or bladder/bowel incontinence.     Notes that he has been limited by low back pain and hip pain in the past.  An x-ray in 2017 showed signs of very mild hip OA.  He remembers seeing a doctor who mentioned that his pain could be related to OA.  He has not done dedicated physical therapy for this in the past.     The patient has not done physical therapy for this problem. Has done PT for his back and still does home exercise program      Patient has tried the following medications with varied success (current meds in bold):   Oral naproxen PRN prior to physical activity primarily for back and hip pain     Medical history includes T1DM, HLD, HTN.    HPI  Patient  identification: Sukumar Tyson ,  1955,   With The primary encounter diagnosis was Chronic bilateral low back pain without sciatica. Diagnoses of Tendinopathy of right gluteus medius, Lumbar spondylosis, Myalgia, Acute pain of left knee, Arthritis of hip, and Neuropathy were also pertinent to this visit.     Presents today for f/u of low back pain. Saw my colleague Dr. Watters for an urgent appointment on 5/15/2023 for acute on chronic exacerbation of low back pain after golfing 2 weeks prior.  He received trigger point injections with Dr. Watters that day and had an XR lumbar spine and XR hips ordered.     Today he notes trigger point injections significantly helped. He also received a prescription for tizanidine which seemed to help.  He was able to enjoy his hiking trip afterwards.  Today he notes ongoing chronic dull bilateral low back pain that does not radiate.  He also reports pain at his lateral glute near the hip area.  He denies history of groin pain.  He reports pain is tolerable and rates 3/10 on NRS.    He has been able to golf without exacerbating his pain.  He reports 1 week history of new medial left knee pain since wearing new shoes with a more pronounced heel lift.  He notes that he did not have this knee pain when he wear shoes without a heel lift .  He is thinking of switching back to his old shoes.    Procedure history:  -5/15/2020 trigger point injections with Dr. Watters  -significant relief.      ROS Red Flags :   Fever, Chills, Sweats: Denies  Involuntary Weight Loss: Denies  Bowel/Bladder Incontinence: Denies  Saddle Anesthesia: Denies        PMHx:   Past Medical History:   Diagnosis Date    HTN (hypertension)        PSHx:   History reviewed. No pertinent surgical history.    Family history   Family History   Problem Relation Age of Onset    No Known Problems Mother     No Known Problems Father          Medications:   Outpatient Medications Marked as Taking for the 23  encounter (Office Visit) with Haley Herron M.D.   Medication Sig Dispense Refill    Fexofenadine HCl (ALLEGRA PO) Take  by mouth.      Continuous Blood Gluc Sensor (FREESTYLE DON 2 SENSOR) [facility] Follow-up box instructions 2 Each 1    amLODIPine (NORVASC) 5 MG Tab Take 1 Tablet by mouth every day. 90 Tablet 1    omeprazole (PRILOSEC) 40 MG delayed-release capsule Take 1 capsule by mouth once daily 90 Capsule 0    B-D UF III MINI PEN NEEDLES 31G X 5 MM Misc USE TO INJECT INSULIN 4 TIMES DAILY      Insulin Pen Needle (PEN NEEDLES) 32G X 5 MM Misc Use to inject insulin 4x daily subq 100 Each 3    Insulin Degludec (TRESIBA FLEXTOUCH) 100 UNIT/ML Solution Pen-injector INJECT 47-50 UNITS SUBCUTANEOUSLY ONCE DAILY 15 mL 6    atorvastatin (LIPITOR) 10 MG Tab Take 1 Tablet by mouth every day. 90 Tablet 3    insulin lispro (HUMALOG,ADMELOG) 100 UNIT/ML Solution Pen-injector injection PEN       Continuous Blood Gluc  (FREESTYLE DON 2 READER) Device Follow box instructions 1 Each 3    tadalafil (CIALIS) 20 MG tablet Take 1 Tablet by mouth as needed for Erectile Dysfunction. 30 Tablet 3    Glucagon (BAQSIMI TWO PACK) 3 MG/DOSE Powder Administer 3 mg into affected nostril(S) one time as needed for up to 1 dose. 1 Each 3    Continuous Blood Gluc Sensor (FREESTYLE DON SENSOR SYSTEM) [facility] Follow instructions on the box 6 Each 3    Multiple Vitamin (DAILY-JEANNA) Tab Take 1 tablet by mouth every day.      glucosamine Sulfate 500 MG Cap Take 500 mg by mouth 3 times a day with meals. Once a day 1500 mg      aspirin (ASA) 81 MG Chew Tab chewable tablet Take 81 mg by mouth every day.          Current Outpatient Medications on File Prior to Visit   Medication Sig Dispense Refill    Fexofenadine HCl (ALLEGRA PO) Take  by mouth.      Continuous Blood Gluc Sensor (FREESTYLE DON 2 SENSOR) [facility] Follow-up box instructions 2 Each 1    amLODIPine (NORVASC) 5 MG Tab Take 1 Tablet by mouth every day. 90 Tablet 1    omeprazole  (PRILOSEC) 40 MG delayed-release capsule Take 1 capsule by mouth once daily 90 Capsule 0    B-D UF III MINI PEN NEEDLES 31G X 5 MM Misc USE TO INJECT INSULIN 4 TIMES DAILY      Insulin Pen Needle (PEN NEEDLES) 32G X 5 MM Misc Use to inject insulin 4x daily subq 100 Each 3    Insulin Degludec (TRESIBA FLEXTOUCH) 100 UNIT/ML Solution Pen-injector INJECT 47-50 UNITS SUBCUTANEOUSLY ONCE DAILY 15 mL 6    atorvastatin (LIPITOR) 10 MG Tab Take 1 Tablet by mouth every day. 90 Tablet 3    insulin lispro (HUMALOG,ADMELOG) 100 UNIT/ML Solution Pen-injector injection PEN       Continuous Blood Gluc  (FREESTYLE DON 2 READER) Device Follow box instructions 1 Each 3    tadalafil (CIALIS) 20 MG tablet Take 1 Tablet by mouth as needed for Erectile Dysfunction. 30 Tablet 3    Glucagon (BAQSIMI TWO PACK) 3 MG/DOSE Powder Administer 3 mg into affected nostril(S) one time as needed for up to 1 dose. 1 Each 3    Continuous Blood Gluc Sensor (FREESTYLE DON SENSOR SYSTEM) Misc Follow instructions on the box 6 Each 3    Multiple Vitamin (DAILY-JEANNA) Tab Take 1 tablet by mouth every day.      glucosamine Sulfate 500 MG Cap Take 500 mg by mouth 3 times a day with meals. Once a day 1500 mg      aspirin (ASA) 81 MG Chew Tab chewable tablet Take 81 mg by mouth every day.      azithromycin (ZITHROMAX) 250 MG Tab Take 2 tabs day one, and then 1 tab daily for remaining days (Patient not taking: Reported on 5/15/2023) 6 Tablet 0    loratadine (CLARITIN) 10 MG Tab Take 10 mg by mouth every day. (Patient not taking: Reported on 5/15/2023)       No current facility-administered medications on file prior to visit.         Allergies:   No Known Allergies    Social Hx:   Social History     Socioeconomic History    Marital status:      Spouse name: Not on file    Number of children: Not on file    Years of education: Not on file    Highest education level: Bachelor's degree (e.g., BA, AB, BS)   Occupational History    Not on file    Tobacco Use    Smoking status: Never    Smokeless tobacco: Never   Vaping Use    Vaping Use: Never used   Substance and Sexual Activity    Alcohol use: Yes     Alcohol/week: 1.2 oz     Types: 2 Cans of beer per week    Drug use: Never    Sexual activity: Yes     Partners: Female   Other Topics Concern    Not on file   Social History Narrative    Not on file     Social Determinants of Health     Financial Resource Strain: Low Risk  (4/26/2021)    Overall Financial Resource Strain (CARDIA)     Difficulty of Paying Living Expenses: Not hard at all   Food Insecurity: No Food Insecurity (4/26/2021)    Hunger Vital Sign     Worried About Running Out of Food in the Last Year: Never true     Ran Out of Food in the Last Year: Never true   Transportation Needs: No Transportation Needs (4/26/2021)    PRAPARE - Transportation     Lack of Transportation (Medical): No     Lack of Transportation (Non-Medical): No   Physical Activity: Sufficiently Active (4/26/2021)    Exercise Vital Sign     Days of Exercise per Week: 7 days     Minutes of Exercise per Session: 50 min   Stress: No Stress Concern Present (4/26/2021)    [place] [employer] - Occupational Stress Questionnaire     Feeling of Stress : Not at all   Social Connections: Unknown (4/26/2021)    Social Connection and Isolation Panel [NHANES]     Frequency of Communication with Friends and Family: Twice a week     Frequency of Social Gatherings with Friends and Family: Three times a week     Attends [employer] Services: Patient refused     Active Member of Clubs or Organizations: Yes     Attends Club or Organization Meetings: More than 4 times per year     Marital Status:    Intimate Partner Violence: Not on file   Housing Stability: Low Risk  (4/26/2021)    Housing Stability Vital Sign     Unable to Pay for Housing in the Last Year: No     Number of Places Lived in the Last Year: 1     Unstable Housing in the Last Year: No         EXAMINATION      Physical Exam:   Vitals: BP (!) 144/80 (BP Location: Right arm, Patient Position: Sitting, BP Cuff Size: Adult)   Pulse (!) 57   Temp 36.7 °C (98 °F) (Temporal)   Ht 1.829 m (6')   Wt 75.7 kg (166 lb 14.2 oz)   SpO2 96%     Constitutional:   Body Habitus: Body mass index is 22.63 kg/m².  Cooperation: Fully cooperates with exam  Appearance: Well-groomed, well-nourished.     Eyes: No scleral icterus to suggest severe liver disease, no proptosis to suggest severe hyperthyroidism     ENT -no obvious auditory deficits, no noticeable facial droop      Skin -no rashes or lesions noted      Respiratory-  breathing comfortably on room air, no audible wheezing     Cardiovascular-distal extremities warm and well perfused.  No lower extremity edema is noted.      Gastrointestinal - no obvious abdominal masses, non-distended     Psychiatric- alert and oriented ×3. Normal affect.      Gait - normal gait, no use of ambulatory device, nonantalgic.      Musculoskeletal and Neuro -      Thoracic/Lumbar Spine/Sacral Spine/Hips   Inspection: No evidence of atrophy in bilateral lower extremities throughout      Palpation:   Tenderness to palpation over the Right gluteus medius, midline lower lumbar spine, and bilateral low back overlying lumbar facets and paraspinal muscles.  No tenderness to palpation elsewhere in the low back/hips including greater trochanters bilaterally and or left gluteus medius.      Facet loading maneuver positive bilaterally    Lumbar spine /hip provocative exam maneuvers  Straight leg raise negative bilaterally  FADIR test and log roll negative bilaterally     SI joint tests  GRACIE test on right reproduces pain at approximate location of right gluteus medius.  Negative on left  Thigh thrust test negative bilaterally        Key points for the international standards for neurological classification of spinal cord injury (ISNCSCI) to light touch.   Dermatome R L   L2 2 2   L3 2 2   L4 2 2   L5 2 2   S1 2  2   S2 2 2         Motor Exam Lower Extremities  ? Myotome R L   Hip flexion L2 5 5   Knee extension L3 5 5   Ankle dorsiflexion L4 5 5   Toe extension L5 5 5   Ankle plantarflexion S1 5 5      Previous exam  Reflexes  ?   R L   Patella   1+ 1+   Achilles    1+ 1+      Clonus of the ankle negative bilaterally                MEDICAL DECISION MAKING    DATA    Labs:   Lab Results   Component Value Date/Time    SODIUM 137 04/06/2022 10:42 AM    POTASSIUM 4.9 04/06/2022 10:42 AM    CHLORIDE 104 04/06/2022 10:42 AM    CO2 25 04/06/2022 10:42 AM    GLUCOSE 303 (H) 04/06/2022 10:42 AM    BUN 26 (H) 04/06/2022 10:42 AM    CREATININE 0.98 04/06/2022 10:42 AM        No results found for: PROTHROMBTM, INR     Lab Results   Component Value Date/Time    WBC 7.4 01/20/2022 06:34 PM    RBC 4.89 01/20/2022 06:34 PM    HEMOGLOBIN 15.4 01/20/2022 06:34 PM    HEMATOCRIT 45.8 01/20/2022 06:34 PM    MCV 93.7 01/20/2022 06:34 PM    MCH 31.5 01/20/2022 06:34 PM    MCHC 33.6 (L) 01/20/2022 06:34 PM    MPV 10.2 01/20/2022 06:34 PM    NEUTSPOLYS 52.00 01/20/2022 06:34 PM    LYMPHOCYTES 37.30 01/20/2022 06:34 PM    MONOCYTES 8.10 01/20/2022 06:34 PM    EOSINOPHILS 1.90 01/20/2022 06:34 PM    BASOPHILS 0.40 01/20/2022 06:34 PM        Lab Results   Component Value Date/Time    HBA1C 7.4 (H) 01/23/2023 09:01 AM        EMG 3/24/2023  Impression:  This is a normal electrodiagnostic study.  There is no electrodiagnostic evidence of a generalized peripheral polyneuropathy, right median or ulnar mononeuropathy, sural mononeuropathy on either side, tibial mononeuropathy on either side, fibular mononeuropathy on either side, or lumbar plexopathy on either side.        Imaging:   I personally reviewed following images  XR lumbar spine 6/7/23  Facet arthropathy of the bilateral lower lumbar levels.  Anterior body osteophyte questionable at L4 and L5.  Disc space narrowing most notable at L3-4, L4-5, and L5-S1. See formal radiology report for further  details.    XR hips 6/7/23  Moderate OA bilaterally. See formal radiology report for further details.      I reviewed the following radiology reports    X-ray hip 6/22/2017  AP pelvis and left lateral hip films were ordered performed and   interpreted by us today and reveal moderate heart osteoarthritic changes   in both hips with femoral head flattening noticed bilaterally with the   right mi reviewed left and sclerotic changes at the superior acetabular   dome bilaterally and early spur formation superolaterally consistent with   osteoarthritis.                                 XR lumbar spine 6/7/23  FINDINGS:  Vertebral body height is well maintained.  There is no evidence of fracture.  Vertebral alignment is normal.  There is moderate to severe degenerative disc disease and facet arthropathy mainly at the L3-S1 levels.        IMPRESSION:     1.  No compression deformity or acute fracture is identified.     2.  There is significant degenerative disc disease and facet arthropathy throughout the lower lumbar spine.             XR hips 6/7/23  FINDINGS:  There is no evidence of acute fracture involving the pelvis. No proximal femoral fracture is identified.  There is moderate joint space narrowing. A core sclerosis and marginal spurring. There is mild flattening of the right femoral head. There is some linear lucency within the right femoral head.     IMPRESSION:     1.  No radiographic evidence of acute traumatic injury.     2.  Findings are consistent with moderate osteoarthritis bilaterally.     3.  There is some flattening of the right femoral head as well as some lucency in the mid femoral head area. Avascular necrosis is a possibility. Prior right femoral head fracture is also possible.           DIAGNOSIS   Visit Diagnoses     ICD-10-CM   1. Chronic bilateral low back pain without sciatica  M54.50    G89.29   2. Tendinopathy of right gluteus medius  M67.951   3. Lumbar spondylosis  M47.816   4. Myalgia   M79.10   5. Acute pain of left knee  M25.562   6. Arthritis of hip  M16.10   7. Neuropathy  G62.9           ASSESSMENT and PLAN:     Sukumar Tyson  1955 male      My impression at this time is his low back pain is primarily myalgia-related given significant improvement with trigger point injections and facetogenic. Exam today notable for tenderness to palpation at bilateral lower lumbar facet joints with exam notable for positive lumbar facet loading maneuver bilaterally reproducing the patient's pain, suggestive of lumbar facetogenic pain.  Imaging shows lumbar spondylosis at bilateral lower lumbar levels.    Does not appear to have hip joint mediated pain at this time, with negative FADIR and log roll and no hx of groin pain.    Right medial knee pain possibly biomechanical as it started with wearing a new shoe with a more pronounced heel lift and resolves when he wears a different shoe. Advised him to return to wearing shoes without as much of a heel lift to see if this pain improves    Sukumar was seen today for follow-up.    Diagnoses and all orders for this visit:    Chronic bilateral low back pain without sciatica    Tendinopathy of right gluteus medius    Lumbar spondylosis    Myalgia    Acute pain of left knee    Arthritis of hip    Neuropathy          Physical Therapy: I ordered physical therapy to focus on strengthening and stretching as well as a home exercise program. Pt to contact me with the name of the PT location he would like a referral to    Diagnostic workup: Personally reviewed at today's visit: XR lumbar spine 23, XR hips 23    Medications:   - took tizanidine PRN with some relief. Pt notes he is not taking this anymore and does not need a refill today    Interventions:    - s/p trigger point injections with Dr. Watters on 5/15/23 with notable relief. Repeat PRN  - briefly discussed trial of diagnostic lumbar medial branch blocks if no improvement in low back pain after  completing a course of PT, and if the patient feels back pain is persistent and severe enough to warrant a procedure     Follow up: 4 months to assess progress with PT.    Haley Herron MD  Interventional Pain and Spine  Physical Medicine and Rehabilitation  [facility]

## 2023-06-29 NOTE — TELEPHONE ENCOUNTER
Patient called and wanted to give the DrSylwiawho wants for physical therapy. The therapist he would like to see.Dr. Allred. Or Dr. Harper on [facility] in [place] .             If you can please call patient with an update.          Thank you,       Vanna

## 2023-07-24 ENCOUNTER — OFFICE VISIT (OUTPATIENT)
Dept: MEDICAL GROUP | Facility: PHYSICIAN GROUP | Age: 68
End: 2023-07-24
Payer: MEDICARE

## 2023-07-24 ENCOUNTER — HOSPITAL ENCOUNTER (OUTPATIENT)
Dept: LAB | Facility: MEDICAL CENTER | Age: 68
End: 2023-07-24
Attending: FAMILY MEDICINE
Payer: MEDICARE

## 2023-07-24 VITALS
OXYGEN SATURATION: 97 % | SYSTOLIC BLOOD PRESSURE: 122 MMHG | TEMPERATURE: 97.9 F | HEIGHT: 72 IN | BODY MASS INDEX: 22.67 KG/M2 | DIASTOLIC BLOOD PRESSURE: 62 MMHG | WEIGHT: 167.4 LBS | HEART RATE: 61 BPM

## 2023-07-24 DIAGNOSIS — Z12.5 SCREENING FOR MALIGNANT NEOPLASM OF PROSTATE: ICD-10-CM

## 2023-07-24 DIAGNOSIS — K21.9 GASTROESOPHAGEAL REFLUX DISEASE WITHOUT ESOPHAGITIS: ICD-10-CM

## 2023-07-24 DIAGNOSIS — E10.40 TYPE 1 DIABETES MELLITUS WITH DIABETIC NEUROPATHY, UNSPECIFIED (HCC): ICD-10-CM

## 2023-07-24 DIAGNOSIS — E10.9 TYPE 1 DIABETES MELLITUS WITHOUT COMPLICATION (HCC): ICD-10-CM

## 2023-07-24 LAB
ALBUMIN SERPL BCP-MCNC: 4.2 G/DL (ref 3.2–4.9)
ALBUMIN/GLOB SERPL: 1.8 G/DL
ALP SERPL-CCNC: 71 U/L (ref 30–99)
ALT SERPL-CCNC: 27 U/L (ref 2–50)
ANION GAP SERPL CALC-SCNC: 6 MMOL/L (ref 7–16)
AST SERPL-CCNC: 21 U/L (ref 12–45)
BILIRUB SERPL-MCNC: 0.6 MG/DL (ref 0.1–1.5)
BUN SERPL-MCNC: 30 MG/DL (ref 8–22)
CALCIUM ALBUM COR SERPL-MCNC: 8.7 MG/DL (ref 8.5–10.5)
CALCIUM SERPL-MCNC: 8.9 MG/DL (ref 8.5–10.5)
CHLORIDE SERPL-SCNC: 108 MMOL/L (ref 96–112)
CO2 SERPL-SCNC: 27 MMOL/L (ref 20–33)
CREAT SERPL-MCNC: 0.72 MG/DL (ref 0.5–1.4)
CREAT UR-MCNC: 161.46 MG/DL
EST. AVERAGE GLUCOSE BLD GHB EST-MCNC: 151 MG/DL
GFR SERPLBLD CREATININE-BSD FMLA CKD-EPI: 101 ML/MIN/1.73 M 2
GLOBULIN SER CALC-MCNC: 2.4 G/DL (ref 1.9–3.5)
GLUCOSE SERPL-MCNC: 218 MG/DL (ref 65–99)
HBA1C MFR BLD: 6.9 % (ref 4–5.6)
MICROALBUMIN UR-MCNC: <1.2 MG/DL
MICROALBUMIN/CREAT UR: NORMAL MG/G (ref 0–30)
POTASSIUM SERPL-SCNC: 4.7 MMOL/L (ref 3.6–5.5)
PROT SERPL-MCNC: 6.6 G/DL (ref 6–8.2)
PSA SERPL-MCNC: 1.73 NG/ML (ref 0–4)
SODIUM SERPL-SCNC: 141 MMOL/L (ref 135–145)

## 2023-07-24 PROCEDURE — 83036 HEMOGLOBIN GLYCOSYLATED A1C: CPT | Mod: GA

## 2023-07-24 PROCEDURE — 82570 ASSAY OF URINE CREATININE: CPT

## 2023-07-24 PROCEDURE — 3078F DIAST BP <80 MM HG: CPT | Performed by: FAMILY MEDICINE

## 2023-07-24 PROCEDURE — 80053 COMPREHEN METABOLIC PANEL: CPT

## 2023-07-24 PROCEDURE — 36415 COLL VENOUS BLD VENIPUNCTURE: CPT | Mod: GA

## 2023-07-24 PROCEDURE — 84153 ASSAY OF PSA TOTAL: CPT | Mod: GA

## 2023-07-24 PROCEDURE — 3074F SYST BP LT 130 MM HG: CPT | Performed by: FAMILY MEDICINE

## 2023-07-24 PROCEDURE — 82043 UR ALBUMIN QUANTITATIVE: CPT

## 2023-07-24 PROCEDURE — 99214 OFFICE O/P EST MOD 30 MIN: CPT | Performed by: FAMILY MEDICINE

## 2023-07-24 RX ORDER — TADALAFIL 20 MG/1
20 TABLET ORAL PRN
Qty: 30 TABLET | Refills: 3 | Status: SHIPPED | OUTPATIENT
Start: 2023-07-24

## 2023-07-24 RX ORDER — INSULIN DEGLUDEC 100 U/ML
INJECTION, SOLUTION SUBCUTANEOUS
Qty: 15 ML | Refills: 6
Start: 2023-07-24

## 2023-07-24 RX ORDER — OMEPRAZOLE 40 MG/1
40 CAPSULE, DELAYED RELEASE ORAL DAILY
Qty: 90 CAPSULE | Refills: 3 | Status: SHIPPED | OUTPATIENT
Start: 2023-07-24

## 2023-07-24 ASSESSMENT — FIBROSIS 4 INDEX: FIB4 SCORE: 1.31

## 2023-07-24 NOTE — PROGRESS NOTES
Subjective:     Chief Complaint   Patient presents with    Follow-Up       HPI:   Sukumar presents today to discuss the following.    Type 1 diabetes mellitus without complication (HCC)  Chronic issue  A1c was up 1/23  On tresiba 28U daily and lispro before meals      Past Medical History:   Diagnosis Date    HTN (hypertension)        Current Outpatient Medications Ordered in Epic   Medication Sig Dispense Refill    Insulin Degludec (TRESIBA FLEXTOUCH) 100 UNIT/ML Solution Pen-injector INJECT 28 UNITS SUBCUTANEOUSLY ONCE DAILY 15 mL 6    omeprazole (PRILOSEC) 40 MG delayed-release capsule Take 1 Capsule by mouth every day. 90 Capsule 3    tadalafil (CIALIS) 20 MG tablet Take 1 Tablet by mouth as needed for Erectile Dysfunction. 30 Tablet 3    Fexofenadine HCl (ALLEGRA PO) Take  by mouth.      Continuous [facility] Gluc S[facility] (FREESTYLE DON 2 SENSOR) [facility] Follow-up box instructions 2 Each 1    amLODIPine (NORVASC) 5 MG Tab Take 1 Tablet by mouth every day. 90 Tablet 1    Insulin Pen Needle (PEN NEEDLES) 32G X 5 MM Misc Use to inject insulin 4x daily subq 100 Each 3    atorvastatin (LIPITOR) 10 MG Tab Take 1 Tablet by mouth every day. 90 Tablet 3    insulin lispro (HUMALOG,ADMELOG) 100 UNIT/ML Solution Pen-injector injection PEN Inject 8 Units under the skin 3 times a day before meals.      Continuous Blood Gluc  (FREESTYLE DON 2 READER) Device Follow box instructions 1 Each 3    Glucagon (BAQSIMI TWO PACK) 3 MG/DOSE Powder Administer 3 mg into affected nostril(S) one time as needed for up to 1 dose. 1 Each 3    Continuous Blood Gluc Sensor (FREESTYLE DON SENSOR SYSTEM) [facility] Follow instructions on the box 6 Each 3    Multiple Vitamin (DAILY-JEANNA) Tab Take 1 tablet by mouth every day.      glucosamine Sulfate 500 MG Cap Take 500 mg by mouth 3 times a day with meals. Once a day 1500 mg      aspirin (ASA) 81 MG Chew Tab chewable tablet Take 81 mg by mouth every day.      azithromycin (ZITHROMAX) 250 MG Tab Take  2 tabs day one, and then 1 tab daily for remaining days (Patient not taking: Reported on 7/24/2023) 6 Tablet 0     No current Epic-ordered facility-administered medications on file.       Allergies:  Patient has no known allergies.    Health Maintenance: Completed    ROS:  Gen: no fevers/chills, no changes in weight  Eyes: no changes in vision  Pulm: no sob, no cough  CV: no chest pain, no palpitations  GI: no nausea/vomiting, no diarrhea      Objective:     Exam:  /62 (BP Location: Left arm, Patient Position: Sitting, BP Cuff Size: Adult)   Pulse 61   Temp 36.6 °C (97.9 °F) (Temporal)   Ht 1.829 m (6')   Wt 75.9 kg (167 lb 6.4 oz)   SpO2 97%   BMI 22.70 kg/m²  Body mass index is 22.7 kg/m².      Constitutional: Alert, no distress, well-groomed.  Skin: Warm, dry, good turgor, no rashes in visible areas.  Eye: Equal, round and reactive, conjunctiva clear, lids normal.  ENMT: Lips without lesions, good dentition, moist mucous membranes.  Neck: Trachea midline, no masses, no thyromegaly.  Respiratory: Unlabored respiratory effort, no cough.  MSK: Normal gait, moves all extremities.  Neuro: Grossly non-focal.   Psych: Alert and oriented x3, normal affect and mood.        Assessment & Plan:     68 y.o. male with the following -     1. Type 1 diabetes mellitus with diabetic neuropathy, unspecified (HCC)  Chronic, stable condition.  I recommend repeating labs at this time.  Continue with current regimen.  - Comp Metabolic Panel; Future  - Hemoglobin A1c; Future  - Microalbumin Creat Ratio Urine - Lab Collect; Future    2. Type 1 diabetes mellitus without complication (HCC)  As above.    3. Screening for malignant neoplasm of prostate  - PROSTATE SPECIFIC AG SCREENING; Future    4. Gastroesophageal reflux disease without esophagitis  Chronic, stable condition.  Continue with omeprazole.  - omeprazole (PRILOSEC) 40 MG delayed-release capsule; Take 1 Capsule by mouth every day.  Dispense: 90 Capsule; Refill:  3      No follow-ups on file.    HCC Gap Form    Diagnosis to address: E10.40 - Type 1 diabetes mellitus with diabetic neuropathy, unspecified (HCC)  Assessment and plan: Chronic, stable. Continue with current defined treatment plan: stable on insulin. Follow-up at least annually.  Last edited 07/24/23 08:03 PDT by Soren Awad M.D.           Please note that this dictation was created using voice recognition software. I have made every reasonable attempt to correct obvious errors, but I expect that there are errors of grammar and possibly content that I did not discover before finalizing the note.

## 2023-08-01 DIAGNOSIS — I10 ESSENTIAL HYPERTENSION: ICD-10-CM

## 2023-08-01 DIAGNOSIS — E10.9 TYPE 1 DIABETES MELLITUS WITHOUT COMPLICATION (HCC): ICD-10-CM

## 2023-08-01 RX ORDER — ATORVASTATIN CALCIUM 10 MG/1
10 TABLET, FILM COATED ORAL DAILY
Qty: 90 TABLET | Refills: 3 | Status: SHIPPED | OUTPATIENT
Start: 2023-08-01

## 2023-09-14 ENCOUNTER — DOCUMENTATION (OUTPATIENT)
Dept: HEALTH INFORMATION MANAGEMENT | Facility: OTHER | Age: 68
End: 2023-09-14
Payer: MEDICARE

## 2023-11-13 RX ORDER — AMLODIPINE BESYLATE 5 MG/1
5 TABLET ORAL DAILY
Qty: 90 TABLET | Refills: 0 | Status: SHIPPED | OUTPATIENT
Start: 2023-11-13

## 2023-11-13 NOTE — TELEPHONE ENCOUNTER
Pt is asking for a refill of amaLopadine 5 mg, blood pressure meds asap . Please call pt with questions

## 2023-12-09 SDOH — ECONOMIC STABILITY: FOOD INSECURITY: WITHIN THE PAST 12 MONTHS, YOU WORRIED THAT YOUR FOOD WOULD RUN OUT BEFORE YOU GOT MONEY TO BUY MORE.: NEVER TRUE

## 2023-12-09 SDOH — ECONOMIC STABILITY: FOOD INSECURITY: WITHIN THE PAST 12 MONTHS, THE FOOD YOU BOUGHT JUST DIDN'T LAST AND YOU DIDN'T HAVE MONEY TO GET MORE.: NEVER TRUE

## 2023-12-09 SDOH — ECONOMIC STABILITY: HOUSING INSECURITY
IN THE LAST 12 MONTHS, WAS THERE A TIME WHEN YOU DID NOT HAVE A STEADY PLACE TO SLEEP OR SLEPT IN A SHELTER (INCLUDING NOW)?: NO

## 2023-12-09 SDOH — ECONOMIC STABILITY: HOUSING INSECURITY: IN THE LAST 12 MONTHS, HOW MANY PLACES HAVE YOU LIVED?: 1

## 2023-12-09 SDOH — ECONOMIC STABILITY: INCOME INSECURITY: IN THE LAST 12 MONTHS, WAS THERE A TIME WHEN YOU WERE NOT ABLE TO PAY THE MORTGAGE OR RENT ON TIME?: NO

## 2023-12-09 SDOH — ECONOMIC STABILITY: TRANSPORTATION INSECURITY
IN THE PAST 12 MONTHS, HAS LACK OF TRANSPORTATION KEPT YOU FROM MEETINGS, WORK, OR FROM GETTING THINGS NEEDED FOR DAILY LIVING?: NO

## 2023-12-09 SDOH — HEALTH STABILITY: PHYSICAL HEALTH: ON AVERAGE, HOW MANY DAYS PER WEEK DO YOU ENGAGE IN MODERATE TO STRENUOUS EXERCISE (LIKE A BRISK WALK)?: 7 DAYS

## 2023-12-09 SDOH — ECONOMIC STABILITY: TRANSPORTATION INSECURITY
IN THE PAST 12 MONTHS, HAS THE LACK OF TRANSPORTATION KEPT YOU FROM MEDICAL APPOINTMENTS OR FROM GETTING MEDICATIONS?: NO

## 2023-12-09 SDOH — HEALTH STABILITY: MENTAL HEALTH
STRESS IS WHEN SOMEONE FEELS TENSE, NERVOUS, ANXIOUS, OR CAN'T SLEEP AT NIGHT BECAUSE THEIR MIND IS TROUBLED. HOW STRESSED ARE YOU?: NOT AT ALL

## 2023-12-09 SDOH — ECONOMIC STABILITY: INCOME INSECURITY: HOW HARD IS IT FOR YOU TO PAY FOR THE VERY BASICS LIKE FOOD, HOUSING, MEDICAL CARE, AND HEATING?: NOT HARD AT ALL

## 2023-12-09 SDOH — HEALTH STABILITY: PHYSICAL HEALTH: ON AVERAGE, HOW MANY MINUTES DO YOU ENGAGE IN EXERCISE AT THIS LEVEL?: 50 MIN

## 2023-12-09 SDOH — ECONOMIC STABILITY: TRANSPORTATION INSECURITY
IN THE PAST 12 MONTHS, HAS LACK OF RELIABLE TRANSPORTATION KEPT YOU FROM MEDICAL APPOINTMENTS, MEETINGS, WORK OR FROM GETTING THINGS NEEDED FOR DAILY LIVING?: NO

## 2023-12-09 ASSESSMENT — SOCIAL DETERMINANTS OF HEALTH (SDOH)
HOW OFTEN DO YOU GET TOGETHER WITH FRIENDS OR RELATIVES?: MORE THAN THREE TIMES A WEEK
WITHIN THE PAST 12 MONTHS, YOU WORRIED THAT YOUR FOOD WOULD RUN OUT BEFORE YOU GOT THE MONEY TO BUY MORE: NEVER TRUE
IN A TYPICAL WEEK, HOW MANY TIMES DO YOU TALK ON THE PHONE WITH FAMILY, FRIENDS, OR NEIGHBORS?: MORE THAN THREE TIMES A WEEK
DO YOU BELONG TO ANY CLUBS OR ORGANIZATIONS SUCH AS CHURCH GROUPS UNIONS, FRATERNAL OR ATHLETIC GROUPS, OR SCHOOL GROUPS?: YES
HOW HARD IS IT FOR YOU TO PAY FOR THE VERY BASICS LIKE FOOD, HOUSING, MEDICAL CARE, AND HEATING?: NOT HARD AT ALL
HOW OFTEN DO YOU ATTENT MEETINGS OF THE CLUB OR ORGANIZATION YOU BELONG TO?: MORE THAN 4 TIMES PER YEAR
HOW OFTEN DO YOU HAVE SIX OR MORE DRINKS ON ONE OCCASION: NEVER
HOW OFTEN DO YOU ATTENT MEETINGS OF THE CLUB OR ORGANIZATION YOU BELONG TO?: MORE THAN 4 TIMES PER YEAR
DO YOU BELONG TO ANY CLUBS OR ORGANIZATIONS SUCH AS CHURCH GROUPS UNIONS, FRATERNAL OR ATHLETIC GROUPS, OR SCHOOL GROUPS?: YES
HOW OFTEN DO YOU ATTEND CHURCH OR RELIGIOUS SERVICES?: 1 TO 4 TIMES PER YEAR
HOW OFTEN DO YOU HAVE A DRINK CONTAINING ALCOHOL: 2-4 TIMES A MONTH
IN A TYPICAL WEEK, HOW MANY TIMES DO YOU TALK ON THE PHONE WITH FAMILY, FRIENDS, OR NEIGHBORS?: MORE THAN THREE TIMES A WEEK
HOW OFTEN DO YOU ATTEND CHURCH OR RELIGIOUS SERVICES?: 1 TO 4 TIMES PER YEAR
HOW MANY DRINKS CONTAINING ALCOHOL DO YOU HAVE ON A TYPICAL DAY WHEN YOU ARE DRINKING: 1 OR 2
HOW OFTEN DO YOU GET TOGETHER WITH FRIENDS OR RELATIVES?: MORE THAN THREE TIMES A WEEK

## 2023-12-09 ASSESSMENT — LIFESTYLE VARIABLES
SKIP TO QUESTIONS 9-10: 1
HOW OFTEN DO YOU HAVE SIX OR MORE DRINKS ON ONE OCCASION: NEVER
AUDIT-C TOTAL SCORE: 2
HOW OFTEN DO YOU HAVE A DRINK CONTAINING ALCOHOL: 2-4 TIMES A MONTH
HOW MANY STANDARD DRINKS CONTAINING ALCOHOL DO YOU HAVE ON A TYPICAL DAY: 1 OR 2

## 2023-12-11 ENCOUNTER — OFFICE VISIT (OUTPATIENT)
Dept: MEDICAL GROUP | Facility: LAB | Age: 68
End: 2023-12-11
Payer: MEDICARE

## 2023-12-11 VITALS
HEART RATE: 64 BPM | TEMPERATURE: 98.7 F | BODY MASS INDEX: 24.78 KG/M2 | HEIGHT: 71 IN | RESPIRATION RATE: 16 BRPM | OXYGEN SATURATION: 97 % | WEIGHT: 177 LBS

## 2023-12-11 DIAGNOSIS — Z76.89 ESTABLISHING CARE WITH NEW DOCTOR, ENCOUNTER FOR: ICD-10-CM

## 2023-12-11 DIAGNOSIS — I15.9 SECONDARY HYPERTENSION: ICD-10-CM

## 2023-12-11 DIAGNOSIS — E55.9 VITAMIN D DEFICIENCY: ICD-10-CM

## 2023-12-11 DIAGNOSIS — M54.50 MIDLINE LOW BACK PAIN WITHOUT SCIATICA, UNSPECIFIED CHRONICITY: ICD-10-CM

## 2023-12-11 DIAGNOSIS — E10.9 TYPE 1 DIABETES MELLITUS WITHOUT COMPLICATION (HCC): ICD-10-CM

## 2023-12-11 DIAGNOSIS — G89.29 CHRONIC RIGHT SHOULDER PAIN: ICD-10-CM

## 2023-12-11 DIAGNOSIS — M25.511 CHRONIC RIGHT SHOULDER PAIN: ICD-10-CM

## 2023-12-11 PROCEDURE — 99204 OFFICE O/P NEW MOD 45 MIN: CPT | Performed by: INTERNAL MEDICINE

## 2023-12-11 RX ORDER — GLUCAGON INJECTION, SOLUTION 1 MG/.2ML
1 INJECTION, SOLUTION SUBCUTANEOUS
Qty: 0.2 ML | Refills: 1 | Status: SHIPPED | OUTPATIENT
Start: 2023-12-11

## 2023-12-11 RX ORDER — INSULIN DEGLUDEC 100 U/ML
INJECTION, SOLUTION SUBCUTANEOUS
Qty: 15 ML | Refills: 11 | Status: SHIPPED | OUTPATIENT
Start: 2023-12-11 | End: 2024-01-19 | Stop reason: SDUPTHER

## 2023-12-11 RX ORDER — AMLODIPINE BESYLATE 5 MG/1
5 TABLET ORAL DAILY
Qty: 90 TABLET | Refills: 3 | Status: SHIPPED | OUTPATIENT
Start: 2023-12-11 | End: 2024-02-13 | Stop reason: SDUPTHER

## 2023-12-11 RX ORDER — INSULIN ASPART 100 [IU]/ML
10 INJECTION, SOLUTION INTRAVENOUS; SUBCUTANEOUS
Qty: 9 ML | Refills: 11 | Status: SHIPPED | OUTPATIENT
Start: 2023-12-11 | End: 2024-12-05

## 2023-12-11 ASSESSMENT — FIBROSIS 4 INDEX: FIB4 SCORE: 1.18

## 2023-12-11 NOTE — PROGRESS NOTES
CC: Sukumar Tyson is a 68 y.o. male is suffering from   Chief Complaint   Patient presents with    Establish Care         SUBJECTIVE:  1. Establishing care with new doctor, encounter for  Darrius is here for establishment of care has a longstanding history of type 1 diabetes is being followed on an outpatient basis by endocrinology    2. Chronic right shoulder pain  Patient with a history of right shoulder pain etiology uncertain x-rays ordered    3. Midline low back pain without sciatica, unspecified chronicity  History of low back pain which is intermittent no change in medical therapy    4. Type 1 diabetes mellitus without complication (HCC)  History of well-controlled type 1 diabetes    5. Vitamin D deficiency  Vitamin D levels need to be checked    6. Secondary hypertension  Stable continue amlodipine        Past social, family, history: , medical retired   Social History     Tobacco Use    Smoking status: Never    Smokeless tobacco: Never   Vaping Use    Vaping Use: Never used   Substance Use Topics    Alcohol use: Yes     Alcohol/week: 1.2 oz     Types: 2 Cans of beer per week    Drug use: Never         MEDICATIONS:    Current Outpatient Medications:     Glucagon (GVOKE HYPOPEN 1-PACK) 1 MG/0.2ML Solution Auto-injector, Inject 1 mg under the skin one time as needed (hypoglycemia) for up to 1 dose., Disp: 0.2 mL, Rfl: 1    Insulin Degludec (TRESIBA FLEXTOUCH) 100 UNIT/ML Solution Pen-injector, INJECT 47 UNITS SUBCUTANEOUSLY ONCE DAILY, Disp: 15 mL, Rfl: 11    insulin aspart (NOVOLOG FLEXPEN) 100 UNIT/ML injection PEN, Inject 10 Units under the skin 3 times a day before meals for 360 days., Disp: 9 mL, Rfl: 11    amLODIPine (NORVASC) 5 MG Tab, Take 1 Tablet by mouth every day., Disp: 90 Tablet, Rfl: 3    amLODIPine (NORVASC) 5 MG Tab, Take 1 Tablet by mouth every day. NEEDS AN APPOINTMENT FOR FUTURE REFILLS., Disp: 90 Tablet, Rfl: 0    atorvastatin (LIPITOR) 10 MG Tab, Take 1  tablet by mouth once daily, Disp: 90 Tablet, Rfl: 3    Insulin Degludec (TRESIBA FLEXTOUCH) 100 UNIT/ML Solution Pen-injector, INJECT 28 UNITS SUBCUTANEOUSLY ONCE DAILY, Disp: 15 mL, Rfl: 6    omeprazole (PRILOSEC) 40 MG delayed-release capsule, Take 1 Capsule by mouth every day., Disp: 90 Capsule, Rfl: 3    tadalafil (CIALIS) 20 MG tablet, Take 1 Tablet by mouth as needed for Erectile Dysfunction., Disp: 30 Tablet, Rfl: 3    Fexofenadine HCl (ALLEGRA PO), Take  by mouth., Disp: , Rfl:     Continuous Blood Gluc Sensor (FREESTYLE DON 2 SENSOR) Misc, Follow-up box instructions, Disp: 2 Each, Rfl: 1    azithromycin (ZITHROMAX) 250 MG Tab, Take 2 tabs day one, and then 1 tab daily for remaining days (Patient not taking: Reported on 7/24/2023), Disp: 6 Tablet, Rfl: 0    Insulin Pen Needle (PEN NEEDLES) 32G X 5 MM Misc, Use to inject insulin 4x daily subq, Disp: 100 Each, Rfl: 3    insulin lispro (HUMALOG,ADMELOG) 100 UNIT/ML Solution Pen-injector injection PEN, Inject 8 Units under the skin 3 times a day before meals., Disp: , Rfl:     Continuous Blood Gluc  (FREESTYLE DON 2 READER) Device, Follow box instructions, Disp: 1 Each, Rfl: 3    Continuous Blood Gluc Sensor (FREESTYLE DON SENSOR SYSTEM) Misc, Follow instructions on the box, Disp: 6 Each, Rfl: 3    Multiple Vitamin (DAILY-JEANNA) Tab, Take 1 tablet by mouth every day., Disp: , Rfl:     glucosamine Sulfate 500 MG Cap, Take 500 mg by mouth 3 times a day with meals. Once a day 1500 mg, Disp: , Rfl:     aspirin (ASA) 81 MG Chew Tab chewable tablet, Take 81 mg by mouth every day., Disp: , Rfl:     PROBLEMS:  Patient Active Problem List    Diagnosis Date Noted    Type 1 diabetes mellitus with diabetic neuropathy, unspecified (HCC) 07/24/2023    Laryngitis 04/10/2023    Neuropathy 01/23/2023    Gastroesophageal reflux disease without esophagitis 04/18/2022    Other chest pain 01/20/2022    Chronic low back pain without sciatica 12/01/2020    Type 1 diabetes  "mellitus without complication (HCC) 06/01/2020    Mixed hyperlipidemia 06/01/2020    Essential hypertension 06/01/2020    Other male erectile dysfunction 06/01/2020       REVIEW OF SYSTEMS:  General: Patient denies any problems with nausea vomiting fever chills chest pain or tightness.  Head:  No history of strokes seizures severe head trauma or loss of consciousness.   HEENT: No history of any significant vision loss, hearing loss, changes in sense of smell hoarseness  Heart: No chest pain tightness squeezing.   Lungs: No recurrent asthma bronchitis pneumonia.   Gastrointestinal: No history of black or bloody stools or  Constipation colonoscopy was done.  Genitourinary:  No increased frequency urgency pain and burning with urination  Musculoskeletal: No joint pain or discomfort.   Skin: No skin changes      PHYSICAL EXAM   Constitutional: Alert, cooperative, not in acute distress.  Cardiovascular:  Rate Rhythm is regular without murmurs rubs clicks.     Thorax & Lungs: Clear to auscultation, no wheezing, rhonchi, or rales  HENT: Normocephalic, Atraumatic.  Eyes: PERRLA, EOMI, Conjunctiva normal.   Neck: Trachia is midline no swelling of the thyroid.   Lymphatic: No lymphadenopathy noted.   Abdomin: Soft non-tender, no rebound, no guarding.   Skin: Warm, Dry, No erythema, No rash.   Extremities: Atraumatic with symmetric distal pulses, No edema, No tenderness, No cyanosis, No clubbing.   Musculoskeletal: Right shoulder loss of range of motion internal/external rotation also adduction likely tendinitis, low back retained forward flexion extension side bending rotation without pain or discomfort  Neurologic: Alert & oriented x 3, cranial nerves II through XII are intact, Normal motor function, Normal sensory function, No focal deficits noted.   Psychiatric: Affect normal, Judgment normal, Mood normal.     VITAL SIGNS:Pulse 64   Temp 37.1 °C (98.7 °F)   Resp 16   Ht 1.803 m (5' 11\")   Wt 80.3 kg (177 lb)   SpO2 " 97%   BMI 24.69 kg/m²     Labs: Reviewed    Assessment:                                                     Plan:    1. Establishing care with new doctor, encounter for  Patient is medically stable  - Lipid Profile; Future  - Insulin Degludec (TRESIBA FLEXTOUCH) 100 UNIT/ML Solution Pen-injector; INJECT 47 UNITS SUBCUTANEOUSLY ONCE DAILY  Dispense: 15 mL; Refill: 11  - insulin aspart (NOVOLOG FLEXPEN) 100 UNIT/ML injection PEN; Inject 10 Units under the skin 3 times a day before meals for 360 days.  Dispense: 9 mL; Refill: 11    2. Chronic right shoulder pain  X-ray ordered  - DX-SHOULDER 2+ LEFT; Future  - CT-CARDIAC SCORING; Future  - CBC WITH DIFFERENTIAL; Future  - Comp Metabolic Panel; Future  - Lipid Profile; Future  - Insulin Degludec (TRESIBA FLEXTOUCH) 100 UNIT/ML Solution Pen-injector; INJECT 47 UNITS SUBCUTANEOUSLY ONCE DAILY  Dispense: 15 mL; Refill: 11  - insulin aspart (NOVOLOG FLEXPEN) 100 UNIT/ML injection PEN; Inject 10 Units under the skin 3 times a day before meals for 360 days.  Dispense: 9 mL; Refill: 11    3. Midline low back pain without sciatica, unspecified chronicity  No change in medical therapy x-rays reviewed    4. Type 1 diabetes mellitus without complication (HCC)  Continue current medical therapy  - Glucagon (GVOKE HYPOPEN 1-PACK) 1 MG/0.2ML Solution Auto-injector; Inject 1 mg under the skin one time as needed (hypoglycemia) for up to 1 dose.  Dispense: 0.2 mL; Refill: 1  - CT-CARDIAC SCORING; Future  - CBC WITH DIFFERENTIAL; Future  - Comp Metabolic Panel; Future  - HEMOGLOBIN A1C; Future  - Lipid Profile; Future  - Insulin Degludec (TRESIBA FLEXTOUCH) 100 UNIT/ML Solution Pen-injector; INJECT 47 UNITS SUBCUTANEOUSLY ONCE DAILY  Dispense: 15 mL; Refill: 11  - insulin aspart (NOVOLOG FLEXPEN) 100 UNIT/ML injection PEN; Inject 10 Units under the skin 3 times a day before meals for 360 days.  Dispense: 9 mL; Refill: 11    5. Vitamin D deficiency  Recheck vitamin D  - CBC WITH  DIFFERENTIAL; Future  - Comp Metabolic Panel; Future  - VITAMIN D,25 HYDROXY (DEFICIENCY); Future  - Lipid Profile; Future  - Insulin Degludec (TRESIBA FLEXTOUCH) 100 UNIT/ML Solution Pen-injector; INJECT 47 UNITS SUBCUTANEOUSLY ONCE DAILY  Dispense: 15 mL; Refill: 11  - insulin aspart (NOVOLOG FLEXPEN) 100 UNIT/ML injection PEN; Inject 10 Units under the skin 3 times a day before meals for 360 days.  Dispense: 9 mL; Refill: 11    6. Secondary hypertension  Continue amlodipine  - amLODIPine (NORVASC) 5 MG Tab; Take 1 Tablet by mouth every day.  Dispense: 90 Tablet; Refill: 3

## 2023-12-14 ENCOUNTER — HOSPITAL ENCOUNTER (OUTPATIENT)
Dept: RADIOLOGY | Facility: MEDICAL CENTER | Age: 68
End: 2023-12-14
Attending: INTERNAL MEDICINE
Payer: COMMERCIAL

## 2023-12-14 ENCOUNTER — HOSPITAL ENCOUNTER (OUTPATIENT)
Dept: RADIOLOGY | Facility: MEDICAL CENTER | Age: 68
End: 2023-12-14
Attending: INTERNAL MEDICINE
Payer: MEDICARE

## 2023-12-14 DIAGNOSIS — G89.29 CHRONIC RIGHT SHOULDER PAIN: ICD-10-CM

## 2023-12-14 DIAGNOSIS — E10.9 TYPE 1 DIABETES MELLITUS WITHOUT COMPLICATION (HCC): ICD-10-CM

## 2023-12-14 DIAGNOSIS — M25.511 CHRONIC RIGHT SHOULDER PAIN: ICD-10-CM

## 2023-12-14 PROCEDURE — 4410556 CT-CARDIAC SCORING (SELF PAY ONLY)

## 2023-12-14 PROCEDURE — 73030 X-RAY EXAM OF SHOULDER: CPT | Mod: RT

## 2023-12-14 PROCEDURE — 73030 X-RAY EXAM OF SHOULDER: CPT | Mod: LT

## 2023-12-18 ENCOUNTER — PATIENT MESSAGE (OUTPATIENT)
Dept: MEDICAL GROUP | Facility: LAB | Age: 68
End: 2023-12-18
Payer: MEDICARE

## 2024-01-19 ENCOUNTER — PATIENT MESSAGE (OUTPATIENT)
Dept: MEDICAL GROUP | Facility: LAB | Age: 69
End: 2024-01-19
Payer: MEDICARE

## 2024-01-19 DIAGNOSIS — Z76.89 ESTABLISHING CARE WITH NEW DOCTOR, ENCOUNTER FOR: ICD-10-CM

## 2024-01-19 DIAGNOSIS — M25.511 CHRONIC RIGHT SHOULDER PAIN: ICD-10-CM

## 2024-01-19 DIAGNOSIS — E55.9 VITAMIN D DEFICIENCY: ICD-10-CM

## 2024-01-19 DIAGNOSIS — E10.9 TYPE 1 DIABETES MELLITUS WITHOUT COMPLICATION (HCC): ICD-10-CM

## 2024-01-19 DIAGNOSIS — G89.29 CHRONIC RIGHT SHOULDER PAIN: ICD-10-CM

## 2024-01-19 RX ORDER — INSULIN DEGLUDEC 100 U/ML
INJECTION, SOLUTION SUBCUTANEOUS
Qty: 15 ML | Refills: 11 | Status: SHIPPED | OUTPATIENT
Start: 2024-01-19

## 2024-02-13 DIAGNOSIS — I15.9 SECONDARY HYPERTENSION: ICD-10-CM

## 2024-02-13 RX ORDER — AMLODIPINE BESYLATE 5 MG/1
5 TABLET ORAL DAILY
Qty: 90 TABLET | Refills: 3 | Status: SHIPPED | OUTPATIENT
Start: 2024-02-13

## 2024-02-13 NOTE — TELEPHONE ENCOUNTER
Received request via: Patient    Was the patient seen in the last year in this department? Yes    Does the patient have an active prescription (recently filled or refills available) for medication(s) requested? No    Pharmacy Name: [facility]    Does the patient have custodial Plus and need 100 day supply (blood pressure, diabetes and cholesterol meds only)? Patient does not have SCP

## 2024-02-29 ENCOUNTER — HOSPITAL ENCOUNTER (OUTPATIENT)
Dept: LAB | Facility: MEDICAL CENTER | Age: 69
End: 2024-02-29
Attending: INTERNAL MEDICINE
Payer: MEDICARE

## 2024-02-29 ENCOUNTER — TELEPHONE (OUTPATIENT)
Dept: MEDICAL GROUP | Facility: LAB | Age: 69
End: 2024-02-29

## 2024-02-29 DIAGNOSIS — M25.511 CHRONIC RIGHT SHOULDER PAIN: ICD-10-CM

## 2024-02-29 DIAGNOSIS — Z76.89 ESTABLISHING CARE WITH NEW DOCTOR, ENCOUNTER FOR: ICD-10-CM

## 2024-02-29 DIAGNOSIS — G89.29 CHRONIC RIGHT SHOULDER PAIN: ICD-10-CM

## 2024-02-29 DIAGNOSIS — E55.9 VITAMIN D DEFICIENCY: ICD-10-CM

## 2024-02-29 DIAGNOSIS — E10.9 TYPE 1 DIABETES MELLITUS WITHOUT COMPLICATION (HCC): ICD-10-CM

## 2024-02-29 LAB
25(OH)D3 SERPL-MCNC: 29 NG/ML (ref 30–100)
ALBUMIN SERPL BCP-MCNC: 4.1 G/DL (ref 3.2–4.9)
ALBUMIN/GLOB SERPL: 1.6 G/DL
ALP SERPL-CCNC: 69 U/L (ref 30–99)
ALT SERPL-CCNC: 18 U/L (ref 2–50)
ANION GAP SERPL CALC-SCNC: 10 MMOL/L (ref 7–16)
AST SERPL-CCNC: 22 U/L (ref 12–45)
BASOPHILS # BLD AUTO: 0.4 % (ref 0–1.8)
BASOPHILS # BLD: 0.03 K/UL (ref 0–0.12)
BILIRUB SERPL-MCNC: 0.5 MG/DL (ref 0.1–1.5)
BUN SERPL-MCNC: 23 MG/DL (ref 8–22)
CALCIUM ALBUM COR SERPL-MCNC: 8.5 MG/DL (ref 8.5–10.5)
CALCIUM SERPL-MCNC: 8.6 MG/DL (ref 8.5–10.5)
CHLORIDE SERPL-SCNC: 109 MMOL/L (ref 96–112)
CHOLEST SERPL-MCNC: 120 MG/DL (ref 100–199)
CO2 SERPL-SCNC: 26 MMOL/L (ref 20–33)
CREAT SERPL-MCNC: 0.75 MG/DL (ref 0.5–1.4)
EOSINOPHIL # BLD AUTO: 0.28 K/UL (ref 0–0.51)
EOSINOPHIL NFR BLD: 4.2 % (ref 0–6.9)
ERYTHROCYTE [DISTWIDTH] IN BLOOD BY AUTOMATED COUNT: 43.7 FL (ref 35.9–50)
EST. AVERAGE GLUCOSE BLD GHB EST-MCNC: 154 MG/DL
FASTING STATUS PATIENT QL REPORTED: NORMAL
GFR SERPLBLD CREATININE-BSD FMLA CKD-EPI: 99 ML/MIN/1.73 M 2
GLOBULIN SER CALC-MCNC: 2.5 G/DL (ref 1.9–3.5)
GLUCOSE SERPL-MCNC: 43 MG/DL (ref 65–99)
HBA1C MFR BLD: 7 % (ref 4–5.6)
HCT VFR BLD AUTO: 45.9 % (ref 42–52)
HDLC SERPL-MCNC: 49 MG/DL
HGB BLD-MCNC: 15.4 G/DL (ref 14–18)
IMM GRANULOCYTES # BLD AUTO: 0.01 K/UL (ref 0–0.11)
IMM GRANULOCYTES NFR BLD AUTO: 0.1 % (ref 0–0.9)
LDLC SERPL CALC-MCNC: 60 MG/DL
LYMPHOCYTES # BLD AUTO: 2.73 K/UL (ref 1–4.8)
LYMPHOCYTES NFR BLD: 40.5 % (ref 22–41)
MCH RBC QN AUTO: 31.1 PG (ref 27–33)
MCHC RBC AUTO-ENTMCNC: 33.6 G/DL (ref 32.3–36.5)
MCV RBC AUTO: 92.7 FL (ref 81.4–97.8)
MONOCYTES # BLD AUTO: 0.64 K/UL (ref 0–0.85)
MONOCYTES NFR BLD AUTO: 9.5 % (ref 0–13.4)
NEUTROPHILS # BLD AUTO: 3.05 K/UL (ref 1.82–7.42)
NEUTROPHILS NFR BLD: 45.3 % (ref 44–72)
NRBC # BLD AUTO: 0 K/UL
NRBC BLD-RTO: 0 /100 WBC (ref 0–0.2)
PLATELET # BLD AUTO: 236 K/UL (ref 164–446)
PMV BLD AUTO: 11.1 FL (ref 9–12.9)
POTASSIUM SERPL-SCNC: 4.2 MMOL/L (ref 3.6–5.5)
PROT SERPL-MCNC: 6.6 G/DL (ref 6–8.2)
RBC # BLD AUTO: 4.95 M/UL (ref 4.7–6.1)
SODIUM SERPL-SCNC: 145 MMOL/L (ref 135–145)
TRIGL SERPL-MCNC: 55 MG/DL (ref 0–149)
WBC # BLD AUTO: 6.7 K/UL (ref 4.8–10.8)

## 2024-02-29 PROCEDURE — 83036 HEMOGLOBIN GLYCOSYLATED A1C: CPT | Mod: GA

## 2024-02-29 PROCEDURE — 82306 VITAMIN D 25 HYDROXY: CPT

## 2024-02-29 PROCEDURE — 80053 COMPREHEN METABOLIC PANEL: CPT

## 2024-02-29 PROCEDURE — 85025 COMPLETE CBC W/AUTO DIFF WBC: CPT

## 2024-02-29 PROCEDURE — 36415 COLL VENOUS BLD VENIPUNCTURE: CPT

## 2024-02-29 PROCEDURE — 80061 LIPID PANEL: CPT

## 2024-02-29 NOTE — TELEPHONE ENCOUNTER
Edy from [facility] at [facility] called and wanted to let Dr. Condon know glucose level is 43 done today at 7:34 am.

## 2024-03-06 ENCOUNTER — OFFICE VISIT (OUTPATIENT)
Dept: MEDICAL GROUP | Facility: LAB | Age: 69
End: 2024-03-06
Payer: MEDICARE

## 2024-03-06 VITALS
BODY MASS INDEX: 23.16 KG/M2 | HEART RATE: 61 BPM | WEIGHT: 171 LBS | DIASTOLIC BLOOD PRESSURE: 64 MMHG | HEIGHT: 72 IN | RESPIRATION RATE: 14 BRPM | TEMPERATURE: 97.8 F | OXYGEN SATURATION: 96 % | SYSTOLIC BLOOD PRESSURE: 132 MMHG

## 2024-03-06 DIAGNOSIS — R93.1 AGATSTON CAC SCORE, >400: ICD-10-CM

## 2024-03-06 DIAGNOSIS — E10.9 TYPE 1 DIABETES MELLITUS WITHOUT COMPLICATION (HCC): ICD-10-CM

## 2024-03-06 DIAGNOSIS — E78.5 DYSLIPIDEMIA: ICD-10-CM

## 2024-03-06 PROCEDURE — 3078F DIAST BP <80 MM HG: CPT | Performed by: INTERNAL MEDICINE

## 2024-03-06 PROCEDURE — 3075F SYST BP GE 130 - 139MM HG: CPT | Performed by: INTERNAL MEDICINE

## 2024-03-06 PROCEDURE — 99214 OFFICE O/P EST MOD 30 MIN: CPT | Performed by: INTERNAL MEDICINE

## 2024-03-06 RX ORDER — FLASH GLUCOSE SENSOR
1 KIT MISCELLANEOUS
Qty: 6 EACH | Refills: 3 | Status: SHIPPED | OUTPATIENT
Start: 2024-03-06

## 2024-03-06 ASSESSMENT — FIBROSIS 4 INDEX: FIB4 SCORE: 1.45

## 2024-03-06 NOTE — PROGRESS NOTES
CC: Sukumar Tyson is a 68 y.o. male is suffering from   Chief Complaint   Patient presents with    Follow-Up         SUBJECTIVE:  1. Type 1 diabetes mellitus without complication (HCC)  Darrius is here for follow-up we have discussed that he is a type I diabetic with adequate control at 7.0 patient like to be closer to 6.5.    2. Agatston CAC score, >400  Patient with a coronary artery calcium score greater than 800, is clinically stable is being treated with atorvastatin 10 mg    3. Dyslipidemia  Continue atorvastatin        Past social, family, history: , retired   Social History     Tobacco Use    Smoking status: Never    Smokeless tobacco: Never   Vaping Use    Vaping Use: Never used   Substance Use Topics    Alcohol use: Yes     Alcohol/week: 1.2 oz     Types: 2 Cans of beer per week    Drug use: Never         MEDICATIONS:    Current Outpatient Medications:     Continuous Blood Gluc Sensor (FREESTYLE DON 14 DAY SENSOR) Misc, 1 Each every 14 days., Disp: 6 Each, Rfl: 3    amLODIPine (NORVASC) 5 MG Tab, Take 1 Tablet by mouth every day., Disp: 90 Tablet, Rfl: 3    Insulin Degludec (TRESIBA FLEXTOUCH) 100 UNIT/ML Solution Pen-injector, INJECT 50 SUBCUTANEOUSLY ONCE DAILY, Disp: 15 mL, Rfl: 11    Glucagon (GVOKE HYPOPEN 1-PACK) 1 MG/0.2ML Solution Auto-injector, Inject 1 mg under the skin one time as needed (hypoglycemia) for up to 1 dose., Disp: 0.2 mL, Rfl: 1    insulin aspart (NOVOLOG FLEXPEN) 100 UNIT/ML injection PEN, Inject 10 Units under the skin 3 times a day before meals for 360 days., Disp: 9 mL, Rfl: 11    amLODIPine (NORVASC) 5 MG Tab, Take 1 Tablet by mouth every day. NEEDS AN APPOINTMENT FOR FUTURE REFILLS., Disp: 90 Tablet, Rfl: 0    atorvastatin (LIPITOR) 10 MG Tab, Take 1 tablet by mouth once daily, Disp: 90 Tablet, Rfl: 3    Insulin Degludec (TRESIBA FLEXTOUCH) 100 UNIT/ML Solution Pen-injector, INJECT 28 UNITS SUBCUTANEOUSLY ONCE DAILY, Disp: 15 mL,  Rfl: 6    omeprazole (PRILOSEC) 40 MG delayed-release capsule, Take 1 Capsule by mouth every day., Disp: 90 Capsule, Rfl: 3    tadalafil (CIALIS) 20 MG tablet, Take 1 Tablet by mouth as needed for Erectile Dysfunction., Disp: 30 Tablet, Rfl: 3    Fexofenadine HCl (ALLEGRA PO), Take  by mouth., Disp: , Rfl:     azithromycin (ZITHROMAX) 250 MG Tab, Take 2 tabs day one, and then 1 tab daily for remaining days (Patient not taking: Reported on 7/24/2023), Disp: 6 Tablet, Rfl: 0    Insulin Pen Needle (PEN NEEDLES) 32G X 5 MM Misc, Use to inject insulin 4x daily subq, Disp: 100 Each, Rfl: 3    insulin lispro (HUMALOG,ADMELOG) 100 UNIT/ML Solution Pen-injector injection PEN, Inject 8 Units under the skin 3 times a day before meals., Disp: , Rfl:     Continuous Blood Gluc  (FREESTYLE DON 2 READER) Device, Follow box instructions, Disp: 1 Each, Rfl: 3    Multiple Vitamin (DAILY-JEANNA) Tab, Take 1 tablet by mouth every day., Disp: , Rfl:     glucosamine Sulfate 500 MG Cap, Take 500 mg by mouth 3 times a day with meals. Once a day 1500 mg, Disp: , Rfl:     aspirin (ASA) 81 MG Chew Tab chewable tablet, Take 81 mg by mouth every day., Disp: , Rfl:     PROBLEMS:  Patient Active Problem List    Diagnosis Date Noted    Type 1 diabetes mellitus with diabetic neuropathy, unspecified (HCC) 07/24/2023    Laryngitis 04/10/2023    Neuropathy 01/23/2023    Gastroesophageal reflux disease without esophagitis 04/18/2022    Other chest pain 01/20/2022    Chronic low back pain without sciatica 12/01/2020    Type 1 diabetes mellitus without complication (HCC) 06/01/2020    Mixed hyperlipidemia 06/01/2020    Essential hypertension 06/01/2020    Other male erectile dysfunction 06/01/2020       REVIEW OF SYSTEMS:  Gen.:  No Nausea, Vomiting, fever, Chills.  Heart: No chest pain.  Lungs:  No shortness of Breath.  Psychological: Regan unusual Anxiety depression     PHYSICAL EXAM   Constitutional: Alert, cooperative, not in acute  distress.  Cardiovascular:  Rate Rhythm is regular without murmurs rubs clicks.     Thorax & Lungs: Clear to auscultation, no wheezing, rhonchi, or rales  HENT: Normocephalic, Atraumatic.  Eyes: PERRLA, EOMI, Conjunctiva normal.   Neck: Trachia is midline no swelling of the thyroid.   Lymphatic: No lymphadenopathy noted.   Neurologic: Alert & oriented x 3, cranial nerves II through XII are intact, Normal motor function, Normal sensory function, No focal deficits noted.   Psychiatric: Affect normal, Judgment normal, Mood normal.     VITAL SIGNS:/64 (BP Location: Left arm, Patient Position: Sitting, BP Cuff Size: Adult)   Pulse 61   Temp 36.6 °C (97.8 °F)   Resp 14   Ht 1.829 m (6')   Wt 77.6 kg (171 lb)   SpO2 96%   BMI 23.19 kg/m²     Labs: Reviewed    Assessment:                                                     Plan:    1. Type 1 diabetes mellitus without complication (HCC)  Continue insulin, continue freestyle don, new prescription sent  - Continuous Blood Gluc Sensor (FREESTYLE DON 14 DAY SENSOR) Misc; 1 Each every 14 days.  Dispense: 6 Each; Refill: 3  - Referral to Optometry  - HEMOGLOBIN A1C; Future    2. Agatston CAC score, >400  Clinically stable, LDL cholesterol is at goal    3. Dyslipidemia  No change in medical therapy

## 2024-03-18 ENCOUNTER — APPOINTMENT (OUTPATIENT)
Dept: MEDICAL GROUP | Facility: LAB | Age: 69
End: 2024-03-18
Payer: MEDICARE

## 2024-06-26 ENCOUNTER — OFFICE VISIT (OUTPATIENT)
Dept: PHYSICAL MEDICINE AND REHAB | Facility: MEDICAL CENTER | Age: 69
End: 2024-06-26
Payer: MEDICARE

## 2024-06-26 VITALS — OXYGEN SATURATION: 97 % | HEART RATE: 61 BPM | BODY MASS INDEX: 22.66 KG/M2 | WEIGHT: 167.11 LBS | TEMPERATURE: 98.2 F

## 2024-06-26 DIAGNOSIS — M47.816 LUMBAR SPONDYLOSIS: ICD-10-CM

## 2024-06-26 DIAGNOSIS — M25.551 CHRONIC PAIN OF BOTH HIPS: ICD-10-CM

## 2024-06-26 DIAGNOSIS — M79.10 MYALGIA: ICD-10-CM

## 2024-06-26 DIAGNOSIS — M25.552 CHRONIC PAIN OF BOTH HIPS: ICD-10-CM

## 2024-06-26 DIAGNOSIS — M67.951 TENDINOPATHY OF RIGHT GLUTEUS MEDIUS: ICD-10-CM

## 2024-06-26 DIAGNOSIS — G89.29 CHRONIC PAIN OF BOTH HIPS: ICD-10-CM

## 2024-06-26 DIAGNOSIS — M54.50 CHRONIC BILATERAL LOW BACK PAIN WITHOUT SCIATICA: ICD-10-CM

## 2024-06-26 DIAGNOSIS — G89.29 CHRONIC BILATERAL LOW BACK PAIN WITHOUT SCIATICA: ICD-10-CM

## 2024-06-26 RX ORDER — LATANOPROST 50 UG/ML
SOLUTION/ DROPS OPHTHALMIC
COMMUNITY
Start: 2024-05-06

## 2024-06-26 RX ORDER — TIZANIDINE 4 MG/1
2-4 TABLET ORAL NIGHTLY PRN
Qty: 30 TABLET | Refills: 2 | Status: SHIPPED | OUTPATIENT
Start: 2024-06-26

## 2024-06-26 RX ORDER — MELOXICAM 15 MG/1
15 TABLET ORAL
Qty: 30 TABLET | Refills: 2 | Status: SHIPPED | OUTPATIENT
Start: 2024-06-26 | End: 2024-07-26

## 2024-06-26 ASSESSMENT — PAIN SCALES - GENERAL: PAINLEVEL: 5=MODERATE PAIN

## 2024-06-26 ASSESSMENT — FIBROSIS 4 INDEX: FIB4 SCORE: 1.47

## 2024-06-26 ASSESSMENT — PATIENT HEALTH QUESTIONNAIRE - PHQ9: CLINICAL INTERPRETATION OF PHQ2 SCORE: 0

## 2024-06-26 NOTE — PROGRESS NOTES
Follow up patient note  Interventional spine and Pain  Physiatry (physical medicine and  Rehabilitation)       Chief complaint:   Chief Complaint   Patient presents with    Follow-Up     Chronic bilateral low back pain without sciatica          HISTORY (2/17/2023):  Sukumar Tyson is a 67 y.o. male who presents today with a dull constant discomfort in his upper thighs for a couple of months and left big toe for several years. This doesn't limit him from doing activities. He is still able to ski without difficulty or exacerbation of pain. He denies noticeable numbness/tingling or weakness in his legs. He notes a history of right sciatica but denies current symptoms of this.  He was referred for an EMG by his PCP. He remembers getting an EMG at least 20 years ago in [place] which was normal. Notes that his A1c has typically been in the 6% range but his most recent A1c was 7.4%. This is the highest it's been.      Pain right now is 2/10 on the numeric pain scale. His pain at its best-worse level during the course of the day is typically 2-3/10, respectively.  Pain worsens with sitting and side bending or twisting and improves with standing, walking, bending forward, and bending backwards. His pain does not interfere with ADLs. The patient denies weakness, numbness, or bladder/bowel incontinence.     Notes that he has been limited by low back pain and hip pain in the past.  An x-ray in 2017 showed signs of very mild hip OA.  He remembers seeing a doctor who mentioned that his pain could be related to OA.  He has not done dedicated physical therapy for this in the past.     The patient has not done physical therapy for this problem. Has done PT for his back and still does home exercise program      Patient has tried the following medications with varied success (current meds in bold):   Oral naproxen PRN prior to physical activity primarily for back and hip pain     Medical history includes T1DM, HLD,  HTN.    HPI  Patient identification: Sukumar Tyson ,  1955,   With Diagnoses of Myalgia, Chronic bilateral low back pain without sciatica, Tendinopathy of right gluteus medius, Lumbar spondylosis, and Chronic pain of both hips were pertinent to this visit.     Presents today for f/u of low back pain. Pain has worsened since his last visit. Constant at his right low back. Feels somewhat similar to the same pain that significant improved with trigger point injections with Dr. Watters on 5/15/23. He would like to repeat trigger point injections today. He has taken tizanidine and meloxicam infrequently with some relief.  Enjoys golfing.    Has done therapy for his pain with his wife who is a retired OT    Procedure history:  -5/15/2023 trigger point injections with Dr. Watters  -significant relief.   24 trigger point injections     ROS Red Flags :   Fever, Chills, Sweats: Denies  Involuntary Weight Loss: Denies  Bowel/Bladder Incontinence: Denies  Saddle Anesthesia: Denies        PMHx:   Past Medical History:   Diagnosis Date    HTN (hypertension)        PSHx:   No past surgical history on file.    Family history   Family History   Problem Relation Age of Onset    No Known Problems Mother     No Known Problems Father          Medications:   Outpatient Medications Marked as Taking for the 24 encounter (Office Visit) with Haley Herron M.D.   Medication Sig Dispense Refill    latanoprost (XALATAN) 0.005 % Solution INSTILL 1 DROP INTO EACH EYE AT BEDTIME      tizanidine (ZANAFLEX) 4 MG Tab Take 0.5-1 Tablets by mouth at bedtime as needed (muscle spasms). 30 Tablet 2    meloxicam (MOBIC) 15 MG tablet Take 1 Tablet by mouth 1 time a day as needed for Moderate Pain or Severe Pain (pain) for up to 30 days. 30 Tablet 2    Continuous Blood Gluc Sensor (FREESTYLE DON 14 DAY SENSOR) Misc 1 Each every 14 days. 6 Each 3    amLODIPine (NORVASC) 5 MG Tab Take 1 Tablet by mouth every day. 90 Tablet 3     Insulin Degludec (TRESIBA FLEXTOUCH) 100 UNIT/ML Solution Pen-injector INJECT 50 SUBCUTANEOUSLY ONCE DAILY 15 mL 11    Glucagon (GVOKE HYPOPEN 1-PACK) 1 MG/0.2ML Solution Auto-injector Inject 1 mg under the skin one time as needed (hypoglycemia) for up to 1 dose. 0.2 mL 1    insulin aspart (NOVOLOG FLEXPEN) 100 UNIT/ML injection PEN Inject 10 Units under the skin 3 times a day before meals for 360 days. 9 mL 11    amLODIPine (NORVASC) 5 MG Tab Take 1 Tablet by mouth every day. NEEDS AN APPOINTMENT FOR FUTURE REFILLS. 90 Tablet 0    atorvastatin (LIPITOR) 10 MG Tab Take 1 tablet by mouth once daily 90 Tablet 3    Insulin Degludec (TRESIBA FLEXTOUCH) 100 UNIT/ML Solution Pen-injector INJECT 28 UNITS SUBCUTANEOUSLY ONCE DAILY 15 mL 6    omeprazole (PRILOSEC) 40 MG delayed-release capsule Take 1 Capsule by mouth every day. 90 Capsule 3    tadalafil (CIALIS) 20 MG tablet Take 1 Tablet by mouth as needed for Erectile Dysfunction. 30 Tablet 3    Fexofenadine HCl (ALLEGRA PO) Take  by mouth.      Insulin Pen Needle (PEN NEEDLES) 32G X 5 MM Misc Use to inject insulin 4x daily subq 100 Each 3    insulin lispro (HUMALOG,ADMELOG) 100 UNIT/ML Solution Pen-injector injection PEN Inject 8 Units under the skin 3 times a day before meals.      Continuous Blood Gluc  (FREESTYLE DON 2 READER) Device Follow box instructions 1 Each 3    Multiple Vitamin (DAILY-JEANNA) Tab Take 1 tablet by mouth every day.      glucosamine Sulfate 500 MG Cap Take 500 mg by mouth 3 times a day with meals. Once a day 1500 mg      aspirin (ASA) 81 MG Chew Tab chewable tablet Take 81 mg by mouth every day.          Current Outpatient Medications on File Prior to Visit   Medication Sig Dispense Refill    latanoprost (XALATAN) 0.005 % Solution INSTILL 1 DROP INTO EACH EYE AT BEDTIME      Continuous Blood Gluc Sensor (FREESTYLE DON 14 DAY SENSOR) Misc 1 Each every 14 days. 6 Each 3    amLODIPine (NORVASC) 5 MG Tab Take 1 Tablet by mouth every day. 90  Tablet 3    Insulin Degludec (TRESIBA FLEXTOUCH) 100 UNIT/ML Solution Pen-injector INJECT 50 SUBCUTANEOUSLY ONCE DAILY 15 mL 11    Glucagon (GVOKE HYPOPEN 1-PACK) 1 MG/0.2ML Solution Auto-injector Inject 1 mg under the skin one time as needed (hypoglycemia) for up to 1 dose. 0.2 mL 1    insulin aspart (NOVOLOG FLEXPEN) 100 UNIT/ML injection PEN Inject 10 Units under the skin 3 times a day before meals for 360 days. 9 mL 11    amLODIPine (NORVASC) 5 MG Tab Take 1 Tablet by mouth every day. NEEDS AN APPOINTMENT FOR FUTURE REFILLS. 90 Tablet 0    atorvastatin (LIPITOR) 10 MG Tab Take 1 tablet by mouth once daily 90 Tablet 3    Insulin Degludec (TRESIBA FLEXTOUCH) 100 UNIT/ML Solution Pen-injector INJECT 28 UNITS SUBCUTANEOUSLY ONCE DAILY 15 mL 6    omeprazole (PRILOSEC) 40 MG delayed-release capsule Take 1 Capsule by mouth every day. 90 Capsule 3    tadalafil (CIALIS) 20 MG tablet Take 1 Tablet by mouth as needed for Erectile Dysfunction. 30 Tablet 3    Fexofenadine HCl (ALLEGRA PO) Take  by mouth.      Insulin Pen Needle (PEN NEEDLES) 32G X 5 MM Misc Use to inject insulin 4x daily subq 100 Each 3    insulin lispro (HUMALOG,ADMELOG) 100 UNIT/ML Solution Pen-injector injection PEN Inject 8 Units under the skin 3 times a day before meals.      Continuous Blood Gluc  (FREESTYLE DON 2 READER) Device Follow box instructions 1 Each 3    Multiple Vitamin (DAILY-JEANNA) Tab Take 1 tablet by mouth every day.      glucosamine Sulfate 500 MG Cap Take 500 mg by mouth 3 times a day with meals. Once a day 1500 mg      aspirin (ASA) 81 MG Chew Tab chewable tablet Take 81 mg by mouth every day.      azithromycin (ZITHROMAX) 250 MG Tab Take 2 tabs day one, and then 1 tab daily for remaining days (Patient not taking: Reported on 7/24/2023) 6 Tablet 0     No current facility-administered medications on file prior to visit.         Allergies:   No Known Allergies    Social Hx:   Social History     Socioeconomic History    Marital  status:      Spouse name: Not on file    Number of children: Not on file    Years of education: Not on file    Highest education level: Bachelor's degree (e.g., BA, AB, BS)   Occupational History    Not on file   Tobacco Use    Smoking status: Never    Smokeless tobacco: Never   Vaping Use    Vaping status: Never Used   Substance and Sexual Activity    Alcohol use: Yes     Alcohol/week: 1.2 oz     Types: 2 Cans of beer per week    Drug use: Never    Sexual activity: Yes     Partners: Female   Other Topics Concern    Not on file   Social History Narrative    Not on file     Social Determinants of Health     Financial Resource Strain: Low Risk  (12/9/2023)    Overall Financial Resource Strain (CARDIA)     Difficulty of Paying Living Expenses: Not hard at all   Food Insecurity: No Food Insecurity (12/9/2023)    Hunger Vital Sign     Worried About Running Out of Food in the Last Year: Never true     Ran Out of Food in the Last Year: Never true   Transportation Needs: No Transportation Needs (12/9/2023)    PRAPARE - Transportation     Lack of Transportation (Medical): No     Lack of Transportation (Non-Medical): No   Physical Activity: Sufficiently Active (12/9/2023)    Exercise Vital Sign     Days of Exercise per Week: 7 days     Minutes of Exercise per Session: 50 min   Stress: No Stress Concern Present (12/9/2023)    [employer] - Occupational Stress Questionnaire     Feeling of Stress : Not at all   Social Connections: Socially Integrated (12/9/2023)    Social Connection and Isolation Panel [NHANES]     Frequency of Communication with Friends and Family: More than three times a week     Frequency of Social Gatherings with Friends and Family: More than three times a week     Attends [employer] Services: 1 to 4 times per year     Active Member of Clubs or Organizations: Yes     Attends Club or Organization Meetings: More than 4 times per year     Marital Status:    Intimate  Partner Violence: Not on file   Housing Stability: Low Risk  (12/9/2023)    Housing Stability Vital Sign     Unable to Pay for Housing in the Last Year: No     Number of Places Lived in the Last Year: 1     Unstable Housing in the Last Year: No         EXAMINATION     Physical Exam:   Vitals: Pulse 61   Temp 36.8 °C (98.2 °F) (Temporal)   Wt 75.8 kg (167 lb 1.7 oz)   SpO2 97%     Constitutional:   Body Habitus: Body mass index is 22.66 kg/m².  Cooperation: Fully cooperates with exam  Appearance: Well-groomed, well-nourished.     Eyes: No scleral icterus to suggest severe liver disease, no proptosis to suggest severe hyperthyroidism     ENT -no obvious auditory deficits, no noticeable facial droop      Skin -no rashes or lesions noted      Respiratory-  breathing comfortably on room air, no audible wheezing     Cardiovascular-distal extremities warm and well perfused.  No lower extremity edema is noted.      Gastrointestinal - no obvious abdominal masses, non-distended     Psychiatric- alert and oriented ×3. Normal affect.      Gait - normal gait, no use of ambulatory device, nonantalgic.      Musculoskeletal and Neuro -      Thoracic/Lumbar Spine/Sacral Spine/Hips   Inspection: No evidence of atrophy in bilateral lower extremities throughout      Palpation:   Tenderness to palpation over the Right gluteus medius and chloe.  No tenderness to palpation elsewhere in the low back/hips including greater trochanters bilaterally and or left gluteus medius.      Facet loading maneuver positive on right, negative on left    Lumbar spine /hip provocative exam maneuvers  Straight leg raise negative bilaterally  FADIR test and log roll negative bilaterally     SI joint tests  GRACIE test on right reproduces pain at approximate location of right gluteus medius.  Negative on left  Thigh thrust test negative bilaterally        Key points for the international standards for neurological classification of spinal cord injury  "(ISNCSCI) to light touch.   Dermatome R L   L2 2 2   L3 2 2   L4 2 2   L5 2 2   S1 2 2   S2 2 2         Motor Exam Lower Extremities  ? Myotome R L   Hip flexion L2 5 5   Knee extension L3 5 5   Ankle dorsiflexion L4 5 5   Toe extension L5 5 5   Ankle plantarflexion S1 5 5      Previous exam  Reflexes  ?   R L   Patella   1+ 1+   Achilles    1+ 1+      Clonus of the ankle negative bilaterally                MEDICAL DECISION MAKING    DATA    Labs:   Lab Results   Component Value Date/Time    SODIUM 145 02/29/2024 07:34 AM    POTASSIUM 4.2 02/29/2024 07:34 AM    CHLORIDE 109 02/29/2024 07:34 AM    CO2 26 02/29/2024 07:34 AM    GLUCOSE 43 (LL) 02/29/2024 07:34 AM    BUN 23 (H) 02/29/2024 07:34 AM    CREATININE 0.75 02/29/2024 07:34 AM        No results found for: \"PROTHROMBTM\", \"INR\"     Lab Results   Component Value Date/Time    WBC 6.7 02/29/2024 07:34 AM    RBC 4.95 02/29/2024 07:34 AM    HEMOGLOBIN 15.4 02/29/2024 07:34 AM    HEMATOCRIT 45.9 02/29/2024 07:34 AM    MCV 92.7 02/29/2024 07:34 AM    MCH 31.1 02/29/2024 07:34 AM    MCHC 33.6 02/29/2024 07:34 AM    MPV 11.1 02/29/2024 07:34 AM    NEUTSPOLYS 45.30 02/29/2024 07:34 AM    LYMPHOCYTES 40.50 02/29/2024 07:34 AM    MONOCYTES 9.50 02/29/2024 07:34 AM    EOSINOPHILS 4.20 02/29/2024 07:34 AM    BASOPHILS 0.40 02/29/2024 07:34 AM        Lab Results   Component Value Date/Time    HBA1C 7.0 (H) 02/29/2024 07:34 AM        EMG 3/24/2023  Impression:  This is a normal electrodiagnostic study.  There is no electrodiagnostic evidence of a generalized peripheral polyneuropathy, right median or ulnar mononeuropathy, sural mononeuropathy on either side, tibial mononeuropathy on either side, fibular mononeuropathy on either side, or lumbar plexopathy on either side.        Imaging:   I personally reviewed following images  XR lumbar spine 6/7/23  Facet arthropathy of the bilateral lower lumbar levels.  Anterior body osteophyte questionable at L4 and L5.  Disc space " narrowing most notable at L3-4, L4-5, and L5-S1. See formal radiology report for further details.    XR hips 6/7/23  Moderate OA bilaterally. See formal radiology report for further details.      I reviewed the following radiology reports    X-ray hip 6/22/2017  AP pelvis and left lateral hip films were ordered performed and   interpreted by us today and reveal moderate heart osteoarthritic changes   in both hips with femoral head flattening noticed bilaterally with the   right mi reviewed left and sclerotic changes at the superior acetabular   dome bilaterally and early spur formation superolaterally consistent with   osteoarthritis.                                 XR lumbar spine 6/7/23  FINDINGS:  Vertebral body height is well maintained.  There is no evidence of fracture.  Vertebral alignment is normal.  There is moderate to severe degenerative disc disease and facet arthropathy mainly at the L3-S1 levels.        IMPRESSION:     1.  No compression deformity or acute fracture is identified.     2.  There is significant degenerative disc disease and facet arthropathy throughout the lower lumbar spine.             XR hips 6/7/23  FINDINGS:  There is no evidence of acute fracture involving the pelvis. No proximal femoral fracture is identified.  There is moderate joint space narrowing. A core sclerosis and marginal spurring. There is mild flattening of the right femoral head. There is some linear lucency within the right femoral head.     IMPRESSION:     1.  No radiographic evidence of acute traumatic injury.     2.  Findings are consistent with moderate osteoarthritis bilaterally.     3.  There is some flattening of the right femoral head as well as some lucency in the mid femoral head area. Avascular necrosis is a possibility. Prior right femoral head fracture is also possible.           DIAGNOSIS   Visit Diagnoses     ICD-10-CM   1. Myalgia  M79.10   2. Chronic bilateral low back pain without sciatica  M54.50     G89.29   3. Tendinopathy of right gluteus medius  M67.951   4. Lumbar spondylosis  M47.816   5. Chronic pain of both hips  M25.551    M25.552    G89.29             ASSESSMENT and PLAN:     Sukumar Tyson  1955 male      My impression at this time is his low back pain is primarily myalgia-related given significant improvement with trigger point injections and facetogenic. Exam today notable for tenderness to palpation at bilateral lower lumbar facet joints with exam notable for positive lumbar facet loading maneuver bilaterally reproducing the patient's pain, suggestive of lumbar facetogenic pain.  Imaging shows lumbar spondylosis at bilateral lower lumbar levels.    Does not appear to have hip joint mediated pain at this time, with negative FADIR and log roll and no hx of groin pain.    Darrius was seen today for follow-up.    Diagnoses and all orders for this visit:    Myalgia  -     Consent for all Surgical, Special Diagnostic or Therapeutic Procedures    Chronic bilateral low back pain without sciatica    Tendinopathy of right gluteus medius    Lumbar spondylosis    Chronic pain of both hips    Other orders  -     tizanidine (ZANAFLEX) 4 MG Tab; Take 0.5-1 Tablets by mouth at bedtime as needed (muscle spasms).  -     meloxicam (MOBIC) 15 MG tablet; Take 1 Tablet by mouth 1 time a day as needed for Moderate Pain or Severe Pain (pain) for up to 30 days.            Physical Therapy: I previously ordered physical therapy to focus on strengthening and stretching as well as a home exercise program    Diagnostic workup: no new imaging needed at this time      Medications:   -Refill tizanidine and mobic PRN which he has taken with some relief.    Interventions:    -Trigger point injections today.   The risks, benefits, and alternatives to this procedure were discussed and the patient wishes to proceed with the procedure. Risks include but are not limited to damage to surrounding structures, infection,  bleeding, worsening of pain which can be permanent, and weakness which can be permanent. Benefits include pain relief and improved function. Alternatives include not doing the procedure.    - discussed trial of diagnostic lumbar medial branch blocks if no improvement in low back pain after today's injections, and if the patient feels back pain is persistent and severe enough to warrant a procedure     Follow up: As needed    Haley Herron MD  Interventional Pain and Spine  Physical Medicine and Rehabilitation  [facility]

## 2024-06-26 NOTE — PROCEDURES
Patient Name: Sukumar Tyson  : 1955  Date of Service: 2024    Physician/s: Haley Herron MD    Pre-operative Diagnosis: Myalgia (M79.1)    Post-operative Diagnosis: Myalgia (M79.1)    Procedure: trigger point injections of the following muscles:    Site R L   Splenius capitis     Splenius cervicis     Sternocleidomastoid     Rhomboids     Levator scapulae     Pectoralis minor     Pectoralis major     Serratus anterior     Teres major/minor     Quadratus lumborum     Paravertebral, cervical     Paravertebral, thoracic     Paravertebral, lumbar     Gluteus chloe x    Gluteus medius x    Gluteus minimus     Tensor fascia colleen     Vastus lateralis     Adductor sarita     Adductor longus     Occipitalis     Cervical paraspinal     Trapezius, upper     Trapezius, mid     Trapezius, lower     Latissimus dorsi       Description of procedure:    The risks, benefits, and alternatives of the procedure were reviewed and discussed with the patient.  Written informed consent was freely obtained. A pre-procedural time-out was conducted by the physician verifying patient’s identity, procedure to be performed, procedure site and side, and allergy verification. Appropriate equipment was determined to be in place for the procedure.     In the office suite exam room the patient was placed in a prone position and the skin areas for injection over the above muscles were marked. A total of 10 areas of pain were identified for injection. The areas of pain were then prepped and draped in the usual sterile fashion. A solution was prepared with 5 mL of 1% lidocaine and 5 mL of 0.5% bupivacaine. Ultrasound was confirmed to view the adjacent structures for blood vessels and nerves and to confirm the needle path was not within the structures. A 27g needle was placed into each of the markings at the areas above under ultrasound guidance with an out of plane approach. After negative aspiration, approximately 0.8-1.5 mL of  the above solution was injected. The needle was removed intact after each trigger point injection, and the patient's back was covered with a 4x4 gauze, the area was cleansed with sterile normal saline, and a dressing was applied. There were no complications noted. The images were uploaded to our media tab for permanent storage.    Haley Herron MD  Interventional Pain and Spine  Physical Medicine and Rehabilitation  [facility]

## 2024-07-16 DIAGNOSIS — K21.9 GASTROESOPHAGEAL REFLUX DISEASE WITHOUT ESOPHAGITIS: ICD-10-CM

## 2024-07-16 RX ORDER — OMEPRAZOLE 40 MG/1
40 CAPSULE, DELAYED RELEASE ORAL DAILY
Qty: 90 CAPSULE | Refills: 3 | Status: SHIPPED | OUTPATIENT
Start: 2024-07-16

## 2024-08-13 ENCOUNTER — TELEPHONE (OUTPATIENT)
Dept: SCHEDULING | Facility: IMAGING CENTER | Age: 69
End: 2024-08-13
Payer: MEDICARE

## 2024-08-13 DIAGNOSIS — M67.951 TENDINOPATHY OF RIGHT GLUTEUS MEDIUS: ICD-10-CM

## 2024-08-13 DIAGNOSIS — G89.29 CHRONIC PAIN OF BOTH HIPS: ICD-10-CM

## 2024-08-13 DIAGNOSIS — M47.816 LUMBAR SPONDYLOSIS: ICD-10-CM

## 2024-08-13 DIAGNOSIS — M25.552 CHRONIC PAIN OF BOTH HIPS: ICD-10-CM

## 2024-08-13 DIAGNOSIS — G89.29 CHRONIC BILATERAL LOW BACK PAIN WITHOUT SCIATICA: ICD-10-CM

## 2024-08-13 DIAGNOSIS — M16.10 ARTHRITIS OF HIP: ICD-10-CM

## 2024-08-13 DIAGNOSIS — M79.10 MYALGIA: ICD-10-CM

## 2024-08-13 DIAGNOSIS — M54.50 CHRONIC BILATERAL LOW BACK PAIN WITHOUT SCIATICA: ICD-10-CM

## 2024-08-13 DIAGNOSIS — M25.551 CHRONIC PAIN OF BOTH HIPS: ICD-10-CM

## 2024-08-13 DIAGNOSIS — M25.562 ACUTE PAIN OF LEFT KNEE: ICD-10-CM

## 2024-08-13 NOTE — TELEPHONE ENCOUNTER
Caller: Sukumar Tyson    Topic/issue: Patient of Dr. Shine is requesting a referral be placed for him for PT. Patient has an office in mind that he would like to go to - requesting a callback from [place] when you have time. Tried to transfer to your extension, but line was not working for some reason.     Callback Number: [phone]    Thank you,  Karen DE LA CRUZ

## 2024-08-13 NOTE — TELEPHONE ENCOUNTER
Phone Number Called: [phone]     Patient called and is requesting referral for Physical therapy. Referral sent to [facility]. #[phone]

## 2024-09-11 ENCOUNTER — HOSPITAL ENCOUNTER (OUTPATIENT)
Dept: LAB | Facility: MEDICAL CENTER | Age: 69
End: 2024-09-11
Attending: INTERNAL MEDICINE
Payer: MEDICARE

## 2024-09-11 DIAGNOSIS — E10.9 TYPE 1 DIABETES MELLITUS WITHOUT COMPLICATION (HCC): ICD-10-CM

## 2024-09-11 LAB
EST. AVERAGE GLUCOSE BLD GHB EST-MCNC: 146 MG/DL
HBA1C MFR BLD: 6.7 % (ref 4–5.6)

## 2024-09-11 PROCEDURE — 36415 COLL VENOUS BLD VENIPUNCTURE: CPT | Mod: GA

## 2024-09-11 PROCEDURE — 83036 HEMOGLOBIN GLYCOSYLATED A1C: CPT | Mod: GA

## 2024-09-16 ENCOUNTER — OFFICE VISIT (OUTPATIENT)
Dept: MEDICAL GROUP | Facility: LAB | Age: 69
End: 2024-09-16
Payer: MEDICARE

## 2024-09-16 VITALS
RESPIRATION RATE: 16 BRPM | TEMPERATURE: 96.5 F | HEART RATE: 57 BPM | SYSTOLIC BLOOD PRESSURE: 122 MMHG | HEIGHT: 72 IN | OXYGEN SATURATION: 97 % | WEIGHT: 171 LBS | BODY MASS INDEX: 23.16 KG/M2 | DIASTOLIC BLOOD PRESSURE: 62 MMHG

## 2024-09-16 DIAGNOSIS — R49.0 HOARSENESS: ICD-10-CM

## 2024-09-16 DIAGNOSIS — E10.9 TYPE 1 DIABETES MELLITUS WITHOUT COMPLICATION (HCC): ICD-10-CM

## 2024-09-16 DIAGNOSIS — H40.9 GLAUCOMA OF BOTH EYES, UNSPECIFIED GLAUCOMA TYPE: ICD-10-CM

## 2024-09-16 DIAGNOSIS — K21.00 GASTROESOPHAGEAL REFLUX DISEASE WITH ESOPHAGITIS WITHOUT HEMORRHAGE: ICD-10-CM

## 2024-09-16 PROCEDURE — 3074F SYST BP LT 130 MM HG: CPT | Performed by: INTERNAL MEDICINE

## 2024-09-16 PROCEDURE — 99214 OFFICE O/P EST MOD 30 MIN: CPT | Performed by: INTERNAL MEDICINE

## 2024-09-16 PROCEDURE — 3078F DIAST BP <80 MM HG: CPT | Performed by: INTERNAL MEDICINE

## 2024-09-16 ASSESSMENT — FIBROSIS 4 INDEX: FIB4 SCORE: 1.47

## 2024-09-16 NOTE — PROGRESS NOTES
CC: Sukumar Tyson is a 69 y.o. male is suffering from   Chief Complaint   Patient presents with    Lab Results         SUBJECTIVE:  1. Type 1 diabetes mellitus without complication (HCC)  Darrius is here for follow-up has a history of very well-controlled type 1 diabetes with glaucoma only    2. Glaucoma of both eyes, unspecified glaucoma type  History of glaucoma bilateral eyes eye examination records are not found    3. Hoarseness  History of hoarseness, query possible problems with heartburn esophagitis leading to hoarseness versus other process referral written to ENT    4. Gastroesophageal reflux disease with esophagitis without hemorrhage  Referral written to gastroenterology    History of Present Illness      Past social, family, history: , retired  Social History     Tobacco Use    Smoking status: Never    Smokeless tobacco: Never   Vaping Use    Vaping status: Never Used   Substance Use Topics    Alcohol use: Yes     Alcohol/week: 1.2 oz     Types: 2 Cans of beer per week    Drug use: Never         MEDICATIONS:    Current Outpatient Medications:     omeprazole (PRILOSEC) 40 MG delayed-release capsule, Take 1 Capsule by mouth every day., Disp: 90 Capsule, Rfl: 3    latanoprost (XALATAN) 0.005 % Solution, INSTILL 1 DROP INTO EACH EYE AT BEDTIME, Disp: , Rfl:     tizanidine (ZANAFLEX) 4 MG Tab, Take 0.5-1 Tablets by mouth at bedtime as needed (muscle spasms)., Disp: 30 Tablet, Rfl: 2    Continuous Blood Gluc Sensor (FREESTYLE DON 14 DAY SENSOR) Misc, 1 Each every 14 days., Disp: 6 Each, Rfl: 3    amLODIPine (NORVASC) 5 MG Tab, Take 1 Tablet by mouth every day., Disp: 90 Tablet, Rfl: 3    Insulin Degludec (TRESIBA FLEXTOUCH) 100 UNIT/ML Solution Pen-injector, INJECT 50 SUBCUTANEOUSLY ONCE DAILY, Disp: 15 mL, Rfl: 11    Glucagon (GVOKE HYPOPEN 1-PACK) 1 MG/0.2ML Solution Auto-injector, Inject 1 mg under the skin one time as needed (hypoglycemia) for up to 1 dose., Disp: 0.2 mL, Rfl: 1    insulin  aspart (NOVOLOG FLEXPEN) 100 UNIT/ML injection PEN, Inject 10 Units under the skin 3 times a day before meals for 360 days., Disp: 9 mL, Rfl: 11    atorvastatin (LIPITOR) 10 MG Tab, Take 1 tablet by mouth once daily, Disp: 90 Tablet, Rfl: 3    tadalafil (CIALIS) 20 MG tablet, Take 1 Tablet by mouth as needed for Erectile Dysfunction., Disp: 30 Tablet, Rfl: 3    Insulin Pen Needle (PEN NEEDLES) 32G X 5 MM Misc, Use to inject insulin 4x daily subq, Disp: 100 Each, Rfl: 3    Continuous Blood Gluc  (FREESTYLE DON 2 READER) Device, Follow box instructions, Disp: 1 Each, Rfl: 3    Multiple Vitamin (DAILY-JEANNA) Tab, Take 1 tablet by mouth every day., Disp: , Rfl:     glucosamine Sulfate 500 MG Cap, Take 500 mg by mouth 3 times a day with meals. Once a day 1500 mg, Disp: , Rfl:     aspirin (ASA) 81 MG Chew Tab chewable tablet, Take 81 mg by mouth every day., Disp: , Rfl:     amLODIPine (NORVASC) 5 MG Tab, Take 1 Tablet by mouth every day. NEEDS AN APPOINTMENT FOR FUTURE REFILLS., Disp: 90 Tablet, Rfl: 0    Insulin Degludec (TRESIBA FLEXTOUCH) 100 UNIT/ML Solution Pen-injector, INJECT 28 UNITS SUBCUTANEOUSLY ONCE DAILY, Disp: 15 mL, Rfl: 6    Fexofenadine HCl (ALLEGRA PO), Take  by mouth., Disp: , Rfl:     azithromycin (ZITHROMAX) 250 MG Tab, Take 2 tabs day one, and then 1 tab daily for remaining days (Patient not taking: Reported on 7/24/2023), Disp: 6 Tablet, Rfl: 0    insulin lispro (HUMALOG,ADMELOG) 100 UNIT/ML Solution Pen-injector injection PEN, Inject 8 Units under the skin 3 times a day before meals. (Patient not taking: Reported on 9/16/2024), Disp: , Rfl:     PROBLEMS:  Patient Active Problem List    Diagnosis Date Noted    Type 1 diabetes mellitus with diabetic neuropathy, unspecified ([facility]) 07/24/2023    Laryngitis 04/10/2023    Neuropathy 01/23/2023    Gastroesophageal reflux disease without esophagitis 04/18/2022    Other chest pain 01/20/2022    Chronic low back pain without sciatica 12/01/2020     Type 1 diabetes mellitus without complication (HCC) 06/01/2020    Mixed hyperlipidemia 06/01/2020    Essential hypertension 06/01/2020    Other male erectile dysfunction 06/01/2020       REVIEW OF SYSTEMS:  Gen.:  No Nausea, Vomiting, fever, Chills.  Heart: No chest pain.  Lungs:  No shortness of Breath.  Psychological: Regan unusual Anxiety depression     PHYSICAL EXAM   Physical Exam       Constitutional: Alert, cooperative, not in acute distress.  Cardiovascular:  Rate Rhythm is regular without murmurs rubs clicks.     Thorax & Lungs: Clear to auscultation, no wheezing, rhonchi, or rales  HENT: Normocephalic, Atraumatic.  Eyes: PERRLA, EOMI, Conjunctiva normal.   Neck: Trachia is midline no swelling of the thyroid.   Lymphatic: No lymphadenopathy noted.   Neurologic: Alert & oriented x 3, cranial nerves II through XII are intact, Normal motor function, Normal sensory function, No focal deficits noted.   Psychiatric: Affect normal, Judgment normal, Mood normal.     VITAL SIGNS:/62 (BP Location: Left arm, Patient Position: Sitting, BP Cuff Size: Adult)   Pulse (!) 57   Temp 35.8 °C (96.5 °F) (Temporal)   Resp 16   Ht 1.829 m (6')   Wt 77.6 kg (171 lb)   SpO2 97%   BMI 23.19 kg/m²     Labs: Reviewed    Assessment:                                                     Plan:  Assessment & Plan       1. Type 1 diabetes mellitus without complication (HCC)  Type 1 diabetes have recommended patient undergo Tdap vaccination also influenza, suggested COVID-19 vaccine  - MICROALBUMIN CREAT RATIO URINE; Future  - HEMOGLOBIN A1C; Future    2. Glaucoma of both eyes, unspecified glaucoma type  Clinically stable  - MICROALBUMIN CREAT RATIO URINE; Future    3. Hoarseness  Referral to ENT written, no evidence of cervical lymphadenopathy or supraclavicular lymphadenopathy no evidence of thyromegaly  - Referral to ENT    4. Gastroesophageal reflux disease with esophagitis without hemorrhage  Referral written to  gastroenterology  - Referral to Gastroenterology

## 2024-10-04 ENCOUNTER — PRE-ADMISSION TESTING (OUTPATIENT)
Dept: ADMISSIONS | Facility: MEDICAL CENTER | Age: 69
End: 2024-10-04
Attending: OTOLARYNGOLOGY
Payer: MEDICARE

## 2024-10-04 DIAGNOSIS — Z01.810 PRE-OPERATIVE CARDIOVASCULAR EXAMINATION: ICD-10-CM

## 2024-10-04 DIAGNOSIS — Z01.812 PRE-OPERATIVE LABORATORY EXAMINATION: ICD-10-CM

## 2024-10-04 LAB
ANION GAP SERPL CALC-SCNC: 9 MMOL/L (ref 7–16)
BUN SERPL-MCNC: 23 MG/DL (ref 8–22)
CALCIUM SERPL-MCNC: 9.1 MG/DL (ref 8.5–10.5)
CHLORIDE SERPL-SCNC: 107 MMOL/L (ref 96–112)
CO2 SERPL-SCNC: 26 MMOL/L (ref 20–33)
CREAT SERPL-MCNC: 0.92 MG/DL (ref 0.5–1.4)
EKG IMPRESSION: NORMAL
GFR SERPLBLD CREATININE-BSD FMLA CKD-EPI: 91 ML/MIN/1.73 M 2
GLUCOSE SERPL-MCNC: 114 MG/DL (ref 65–99)
POTASSIUM SERPL-SCNC: 4.2 MMOL/L (ref 3.6–5.5)
SODIUM SERPL-SCNC: 142 MMOL/L (ref 135–145)

## 2024-10-04 PROCEDURE — 80048 BASIC METABOLIC PNL TOTAL CA: CPT

## 2024-10-04 PROCEDURE — 36415 COLL VENOUS BLD VENIPUNCTURE: CPT

## 2024-10-04 PROCEDURE — 93005 ELECTROCARDIOGRAM TRACING: CPT

## 2024-10-04 PROCEDURE — 93010 ELECTROCARDIOGRAM REPORT: CPT | Performed by: INTERNAL MEDICINE

## 2024-10-04 RX ORDER — LORATADINE 10 MG/1
10 TABLET ORAL DAILY
COMMUNITY

## 2024-10-07 ENCOUNTER — PATIENT MESSAGE (OUTPATIENT)
Dept: MEDICAL GROUP | Facility: LAB | Age: 69
End: 2024-10-07
Payer: MEDICARE

## 2024-10-10 ENCOUNTER — ANESTHESIA EVENT (OUTPATIENT)
Dept: SURGERY | Facility: MEDICAL CENTER | Age: 69
End: 2024-10-10
Payer: MEDICARE

## 2024-10-10 ENCOUNTER — ANESTHESIA (OUTPATIENT)
Dept: SURGERY | Facility: MEDICAL CENTER | Age: 69
End: 2024-10-10
Payer: MEDICARE

## 2024-10-10 ENCOUNTER — HOSPITAL ENCOUNTER (OUTPATIENT)
Facility: MEDICAL CENTER | Age: 69
End: 2024-10-10
Attending: OTOLARYNGOLOGY | Admitting: OTOLARYNGOLOGY
Payer: MEDICARE

## 2024-10-10 VITALS
HEIGHT: 72 IN | SYSTOLIC BLOOD PRESSURE: 165 MMHG | OXYGEN SATURATION: 93 % | WEIGHT: 166.01 LBS | RESPIRATION RATE: 17 BRPM | DIASTOLIC BLOOD PRESSURE: 79 MMHG | BODY MASS INDEX: 22.48 KG/M2 | TEMPERATURE: 97.2 F | HEART RATE: 60 BPM

## 2024-10-10 LAB
GLUCOSE BLD STRIP.AUTO-MCNC: 148 MG/DL (ref 65–99)
PATHOLOGY CONSULT NOTE: NORMAL

## 2024-10-10 PROCEDURE — A9270 NON-COVERED ITEM OR SERVICE: HCPCS | Performed by: OTOLARYNGOLOGY

## 2024-10-10 PROCEDURE — 700111 HCHG RX REV CODE 636 W/ 250 OVERRIDE (IP): Mod: JZ

## 2024-10-10 PROCEDURE — 82962 GLUCOSE BLOOD TEST: CPT

## 2024-10-10 PROCEDURE — 160028 HCHG SURGERY MINUTES - 1ST 30 MINS LEVEL 3: Performed by: OTOLARYNGOLOGY

## 2024-10-10 PROCEDURE — 160002 HCHG RECOVERY MINUTES (STAT): Performed by: OTOLARYNGOLOGY

## 2024-10-10 PROCEDURE — 700101 HCHG RX REV CODE 250: Performed by: ANESTHESIOLOGY

## 2024-10-10 PROCEDURE — 700102 HCHG RX REV CODE 250 W/ 637 OVERRIDE(OP): Performed by: OTOLARYNGOLOGY

## 2024-10-10 PROCEDURE — 160048 HCHG OR STATISTICAL LEVEL 1-5: Performed by: OTOLARYNGOLOGY

## 2024-10-10 PROCEDURE — 88305 TISSUE EXAM BY PATHOLOGIST: CPT

## 2024-10-10 PROCEDURE — 160035 HCHG PACU - 1ST 60 MINS PHASE I: Performed by: OTOLARYNGOLOGY

## 2024-10-10 PROCEDURE — 160025 RECOVERY II MINUTES (STATS): Performed by: OTOLARYNGOLOGY

## 2024-10-10 PROCEDURE — 160039 HCHG SURGERY MINUTES - EA ADDL 1 MIN LEVEL 3: Performed by: OTOLARYNGOLOGY

## 2024-10-10 PROCEDURE — 700105 HCHG RX REV CODE 258: Performed by: OTOLARYNGOLOGY

## 2024-10-10 PROCEDURE — 160046 HCHG PACU - 1ST 60 MINS PHASE II: Performed by: OTOLARYNGOLOGY

## 2024-10-10 PROCEDURE — 160009 HCHG ANES TIME/MIN: Performed by: OTOLARYNGOLOGY

## 2024-10-10 PROCEDURE — C1894 INTRO/SHEATH, NON-LASER: HCPCS | Performed by: OTOLARYNGOLOGY

## 2024-10-10 PROCEDURE — 700111 HCHG RX REV CODE 636 W/ 250 OVERRIDE (IP): Performed by: ANESTHESIOLOGY

## 2024-10-10 RX ORDER — HALOPERIDOL 5 MG/ML
1 INJECTION INTRAMUSCULAR
Status: DISCONTINUED | OUTPATIENT
Start: 2024-10-10 | End: 2024-10-10 | Stop reason: HOSPADM

## 2024-10-10 RX ORDER — SODIUM CHLORIDE, SODIUM LACTATE, POTASSIUM CHLORIDE, CALCIUM CHLORIDE 600; 310; 30; 20 MG/100ML; MG/100ML; MG/100ML; MG/100ML
INJECTION, SOLUTION INTRAVENOUS CONTINUOUS
Status: DISCONTINUED | OUTPATIENT
Start: 2024-10-10 | End: 2024-10-10 | Stop reason: HOSPADM

## 2024-10-10 RX ORDER — OXYCODONE HCL 5 MG/5 ML
5 SOLUTION, ORAL ORAL
Status: DISCONTINUED | OUTPATIENT
Start: 2024-10-10 | End: 2024-10-10 | Stop reason: HOSPADM

## 2024-10-10 RX ORDER — OXYMETAZOLINE HYDROCHLORIDE 0.05 G/100ML
SPRAY NASAL
Status: DISCONTINUED | OUTPATIENT
Start: 2024-10-10 | End: 2024-10-10 | Stop reason: HOSPADM

## 2024-10-10 RX ORDER — ONDANSETRON 2 MG/ML
4 INJECTION INTRAMUSCULAR; INTRAVENOUS
Status: DISCONTINUED | OUTPATIENT
Start: 2024-10-10 | End: 2024-10-10 | Stop reason: HOSPADM

## 2024-10-10 RX ORDER — MIDAZOLAM HYDROCHLORIDE 1 MG/ML
INJECTION INTRAMUSCULAR; INTRAVENOUS PRN
Status: DISCONTINUED | OUTPATIENT
Start: 2024-10-10 | End: 2024-10-10 | Stop reason: SURG

## 2024-10-10 RX ORDER — DEXAMETHASONE SODIUM PHOSPHATE 4 MG/ML
INJECTION, SOLUTION INTRA-ARTICULAR; INTRALESIONAL; INTRAMUSCULAR; INTRAVENOUS; SOFT TISSUE PRN
Status: DISCONTINUED | OUTPATIENT
Start: 2024-10-10 | End: 2024-10-10 | Stop reason: SURG

## 2024-10-10 RX ORDER — LIDOCAINE HYDROCHLORIDE 10 MG/ML
INJECTION, SOLUTION EPIDURAL; INFILTRATION; INTRACAUDAL; PERINEURAL
Status: COMPLETED
Start: 2024-10-10 | End: 2024-10-10

## 2024-10-10 RX ORDER — ONDANSETRON 2 MG/ML
INJECTION INTRAMUSCULAR; INTRAVENOUS PRN
Status: DISCONTINUED | OUTPATIENT
Start: 2024-10-10 | End: 2024-10-10 | Stop reason: SURG

## 2024-10-10 RX ORDER — LIDOCAINE HYDROCHLORIDE 20 MG/ML
INJECTION, SOLUTION EPIDURAL; INFILTRATION; INTRACAUDAL; PERINEURAL PRN
Status: DISCONTINUED | OUTPATIENT
Start: 2024-10-10 | End: 2024-10-10 | Stop reason: SURG

## 2024-10-10 RX ORDER — DIPHENHYDRAMINE HYDROCHLORIDE 50 MG/ML
12.5 INJECTION INTRAMUSCULAR; INTRAVENOUS
Status: DISCONTINUED | OUTPATIENT
Start: 2024-10-10 | End: 2024-10-10 | Stop reason: HOSPADM

## 2024-10-10 RX ORDER — OXYCODONE HCL 5 MG/5 ML
10 SOLUTION, ORAL ORAL
Status: DISCONTINUED | OUTPATIENT
Start: 2024-10-10 | End: 2024-10-10 | Stop reason: HOSPADM

## 2024-10-10 RX ADMIN — ONDANSETRON 4 MG: 2 INJECTION INTRAMUSCULAR; INTRAVENOUS at 10:49

## 2024-10-10 RX ADMIN — FENTANYL CITRATE 50 MCG: 50 INJECTION, SOLUTION INTRAMUSCULAR; INTRAVENOUS at 10:49

## 2024-10-10 RX ADMIN — PROPOFOL 200 MG: 10 INJECTION, EMULSION INTRAVENOUS at 10:52

## 2024-10-10 RX ADMIN — LIDOCAINE HYDROCHLORIDE 5 ML: 10 INJECTION, SOLUTION EPIDURAL; INFILTRATION; INTRACAUDAL at 09:27

## 2024-10-10 RX ADMIN — LIDOCAINE HYDROCHLORIDE 100 MG: 20 INJECTION, SOLUTION EPIDURAL; INFILTRATION; INTRACAUDAL at 10:52

## 2024-10-10 RX ADMIN — MIDAZOLAM HYDROCHLORIDE 2 MG: 2 INJECTION, SOLUTION INTRAMUSCULAR; INTRAVENOUS at 10:37

## 2024-10-10 RX ADMIN — FENTANYL CITRATE 50 MCG: 50 INJECTION, SOLUTION INTRAMUSCULAR; INTRAVENOUS at 10:52

## 2024-10-10 RX ADMIN — DEXAMETHASONE SODIUM PHOSPHATE 4 MG: 4 INJECTION INTRA-ARTICULAR; INTRALESIONAL; INTRAMUSCULAR; INTRAVENOUS; SOFT TISSUE at 10:49

## 2024-10-10 RX ADMIN — Medication 5 ML: at 09:27

## 2024-10-10 RX ADMIN — SODIUM CHLORIDE, POTASSIUM CHLORIDE, SODIUM LACTATE AND CALCIUM CHLORIDE: 600; 310; 30; 20 INJECTION, SOLUTION INTRAVENOUS at 09:27

## 2024-10-10 ASSESSMENT — PAIN DESCRIPTION - PAIN TYPE
TYPE: SURGICAL PAIN

## 2024-10-10 ASSESSMENT — FIBROSIS 4 INDEX: FIB4 SCORE: 1.47

## 2024-10-11 DIAGNOSIS — E10.9 TYPE 1 DIABETES MELLITUS WITHOUT COMPLICATION (HCC): ICD-10-CM

## 2024-10-11 DIAGNOSIS — I10 ESSENTIAL HYPERTENSION: ICD-10-CM

## 2024-10-11 RX ORDER — ATORVASTATIN CALCIUM 10 MG/1
10 TABLET, FILM COATED ORAL DAILY
Qty: 90 TABLET | Refills: 3 | Status: SHIPPED | OUTPATIENT
Start: 2024-10-11

## 2024-11-08 ENCOUNTER — PATIENT MESSAGE (OUTPATIENT)
Dept: PHYSICAL MEDICINE AND REHAB | Facility: MEDICAL CENTER | Age: 69
End: 2024-11-08
Payer: MEDICARE

## 2024-11-13 ENCOUNTER — OFFICE VISIT (OUTPATIENT)
Dept: PHYSICAL MEDICINE AND REHAB | Facility: MEDICAL CENTER | Age: 69
End: 2024-11-13
Payer: MEDICARE

## 2024-11-13 VITALS
WEIGHT: 169.97 LBS | HEART RATE: 52 BPM | DIASTOLIC BLOOD PRESSURE: 84 MMHG | TEMPERATURE: 98 F | BODY MASS INDEX: 23.02 KG/M2 | SYSTOLIC BLOOD PRESSURE: 148 MMHG | HEIGHT: 72 IN | OXYGEN SATURATION: 95 %

## 2024-11-13 DIAGNOSIS — M79.10 MYALGIA: ICD-10-CM

## 2024-11-13 DIAGNOSIS — G89.29 CHRONIC BILATERAL LOW BACK PAIN WITHOUT SCIATICA: ICD-10-CM

## 2024-11-13 DIAGNOSIS — M54.50 CHRONIC BILATERAL LOW BACK PAIN WITHOUT SCIATICA: ICD-10-CM

## 2024-11-13 DIAGNOSIS — M79.18 GLUTEAL PAIN: ICD-10-CM

## 2024-11-13 DIAGNOSIS — M47.816 LUMBAR SPONDYLOSIS: ICD-10-CM

## 2024-11-13 PROCEDURE — 3079F DIAST BP 80-89 MM HG: CPT | Performed by: STUDENT IN AN ORGANIZED HEALTH CARE EDUCATION/TRAINING PROGRAM

## 2024-11-13 PROCEDURE — 1125F AMNT PAIN NOTED PAIN PRSNT: CPT | Performed by: STUDENT IN AN ORGANIZED HEALTH CARE EDUCATION/TRAINING PROGRAM

## 2024-11-13 PROCEDURE — G2211 COMPLEX E/M VISIT ADD ON: HCPCS | Performed by: STUDENT IN AN ORGANIZED HEALTH CARE EDUCATION/TRAINING PROGRAM

## 2024-11-13 PROCEDURE — 99214 OFFICE O/P EST MOD 30 MIN: CPT | Performed by: STUDENT IN AN ORGANIZED HEALTH CARE EDUCATION/TRAINING PROGRAM

## 2024-11-13 PROCEDURE — 3077F SYST BP >= 140 MM HG: CPT | Performed by: STUDENT IN AN ORGANIZED HEALTH CARE EDUCATION/TRAINING PROGRAM

## 2024-11-13 ASSESSMENT — PAIN SCALES - GENERAL: PAINLEVEL_OUTOF10: 6=MODERATE PAIN

## 2024-11-13 ASSESSMENT — PATIENT HEALTH QUESTIONNAIRE - PHQ9: CLINICAL INTERPRETATION OF PHQ2 SCORE: 0

## 2024-11-13 ASSESSMENT — FIBROSIS 4 INDEX: FIB4 SCORE: 1.47

## 2024-11-13 NOTE — PROGRESS NOTES
Verbal consent was acquired by the patient to use [employer] ambient listening note generation during this visit Yes     Follow up patient note  Interventional spine and Pain  Physiatry (physical medicine and  Rehabilitation)       Chief complaint:   Chief Complaint   Patient presents with    Follow-Up     Myalgia          HISTORY (2/17/2023):  Sukumar Tyson is a 67 y.o. male who presents today with a dull constant discomfort in his upper thighs for a couple of months and left big toe for several years. This doesn't limit him from doing activities. He is still able to ski without difficulty or exacerbation of pain. He denies noticeable numbness/tingling or weakness in his legs. He notes a history of right sciatica but denies current symptoms of this.  He was referred for an EMG by his PCP. He remembers getting an EMG at least 20 years ago in [place] which was normal. Notes that his A1c has typically been in the 6% range but his most recent A1c was 7.4%. This is the highest it's been.      Pain right now is 2/10 on the numeric pain scale. His pain at its best-worse level during the course of the day is typically 2-3/10, respectively.  Pain worsens with sitting and side bending or twisting and improves with standing, walking, bending forward, and bending backwards. His pain does not interfere with ADLs. The patient denies weakness, numbness, or bladder/bowel incontinence.     Notes that he has been limited by low back pain and hip pain in the past.  An x-ray in 2017 showed signs of very mild hip OA.  He remembers seeing a doctor who mentioned that his pain could be related to OA.  He has not done dedicated physical therapy for this in the past.     The patient has not done physical therapy for this problem. Has done PT for his back and still does home exercise program      Patient has tried the following medications with varied success (current meds in bold):   Oral naproxen PRN prior to physical activity  primarily for back and hip pain     Medical history includes T1DM, HLD, HTN.    HPI  Patient identification: Sukumar Tyson ,  1955,   With Diagnoses of Chronic bilateral low back pain without sciatica, Lumbar spondylosis, Myalgia, and Gluteal pain were pertinent to this visit.     History of Present Illness  The patient is a 69-year-old male who presents for a follow-up.    He has been attending physical therapy since 2024, which he describes as minimal. Despite daily exercises, he reports no improvement in his low back pain, which has been present for years and has recently worsened. His pain intensifies when transitioning from sitting to standing or lying to standing positions.  With physical therapy he has noticed an improvement in his flexibility, but not in his pain levels. He experiences pain when getting up from a lying position, but not while lying down. He performs 100 sit-ups daily, which he finds painful.    He has been experiencing left gluteal pain for the last few days, feeling like the beginnings of sciatica, which he manages with massage. He recalls a new exercise introduced in physical therapy that involved pulling his toes back 10 times on each leg, which was intended to target the nerve and muscle.    He has not had an MRI of his lower back, but previous x-rays of his lumbar and hip revealed significant arthritis. He received trigger point injections in , which provided relief, but a subsequent injection in 2024 only provided temporary relief. He has not taken tizanidine or Mobic for several months, but found them helpful when his pain was severe.    He enjoys golfing four times a week but is concerned about the upcoming ski season due to a painful fall last year.    He reports no history of having a pacemaker or wiring and does not suffer from claustrophobia.    Procedure history:  -5/15/2023 trigger point injections with Dr. Watters  -significant relief.   -24  trigger point injections -1 day of relief     ROS Red Flags :   Fever, Chills, Sweats: Denies  Involuntary Weight Loss: Denies  Bowel/Bladder Incontinence: Denies  Saddle Anesthesia: Denies        PMHx:   Past Medical History:   Diagnosis Date    Arrhythmia 10/04/2024    History of SVT 20 years ago.    Diabetes (HCC)     GERD (gastroesophageal reflux disease)     Glaucoma     High cholesterol     HTN (hypertension)     Neuropathy        PSHx:   Past Surgical History:   Procedure Laterality Date    SD LARYNGOSCOPY,DIRCT,OP SCOP,EXC TUMR Right 10/10/2024    Procedure: RIGHT MICRO LARYNGOSCOPY WITH TRUE VOCAL CORD STRIPPING, LASER;  Surgeon: Roni Vidales M.D.;  Location: SURGERY SAME DAY [facility];  Service: Ent    OTHER CARDIAC SURGERY  10/04/2024    Ablation 20 years ago for SVT    TONSILLECTOMY      as a child       Family history   Family History   Problem Relation Age of Onset    No Known Problems Mother     No Known Problems Father          Medications:   Outpatient Medications Marked as Taking for the 11/13/24 encounter (Office Visit) with Haley Herron M.D.   Medication Sig Dispense Refill    atorvastatin (LIPITOR) 10 MG Tab Take 1 Tablet by mouth every day. 90 Tablet 3    loratadine (CLARITIN) 10 MG Tab Take 10 mg by mouth every day.        **MEDICATION INSTRUCTIONS FOR SURGERY/PROCEDURE SCHEDULED FOR 10/10/2024   OK TO CONTINUE TAKING PRIOR TO SURGERY AND DAY OF SURGERY      COLLAGEN PO Take  by mouth every day.          **MEDICATION INSTRUCTIONS FOR SURGERY/PROCEDURE SCHEDULED FOR 10/10/2024   DO NOT TAKE 7 DAYS PRIOR TO SURGERY      omeprazole (PRILOSEC) 40 MG delayed-release capsule Take 1 Capsule by mouth every day. (Patient taking differently: Take 40 mg by mouth every day.        **MEDICATION INSTRUCTIONS FOR SURGERY/PROCEDURE SCHEDULED FOR 10/10/2024.   OK TO CONTINUE TAKING PRIOR TO SURGERY AND DAY OF SURGERY) 90 Capsule 3    latanoprost (XALATAN) 0.005 % Solution        **MEDICATION  INSTRUCTIONS FOR SURGERY/PROCEDURE SCHEDULED FOR 10/10/2024   OK TO CONTINUE TAKING PRIOR TO SURGERY AND NIGHT BEFORE SURGERY      Continuous Blood Gluc Sensor (FREESTYLE DON 14 DAY SENSOR) Misc 1 Each every 14 days. 6 Each 3    amLODIPine (NORVASC) 5 MG Tab Take 1 Tablet by mouth every day. (Patient taking differently: Take 5 mg by mouth every day.          **MEDICATION INSTRUCTIONS FOR SURGERY/PROCEDURE SCHEDULED FOR 10/10/2024.   OK TO CONTINUE TAKING PRIOR TO SURGERY AND DAY OF SURGERY) 90 Tablet 3    Insulin Degludec (TRESIBA FLEXTOUCH) 100 UNIT/ML Solution Pen-injector INJECT 50 SUBCUTANEOUSLY ONCE DAILY (Patient taking differently: 10 Units. INJECT 50 SUBCUTANEOUSLY ONCE DAILY            **MEDICATION INSTRUCTIONS FOR SURGERY/PROCEDURE SCHEDULED FOR 10/10/2024   COORDINATE MEDICATION INSTRUCTIONS FROM PRESCRIBING PHYSICIAN) 15 mL 11    Glucagon (GVOKE HYPOPEN 1-PACK) 1 MG/0.2ML Solution Auto-injector Inject 1 mg under the skin one time as needed (hypoglycemia) for up to 1 dose. (Patient taking differently: Inject 1 mg under the skin one time as needed (hypoglycemia).          **MEDICATION INSTRUCTIONS FOR SURGERY/PROCEDURE SCHEDULED FOR 10/10/2024   OK TO CONTINUE TAKING AS PRESCRIBED) 0.2 mL 1    tadalafil (CIALIS) 20 MG tablet Take 1 Tablet by mouth as needed for Erectile Dysfunction. (Patient taking differently: Take 20 mg by mouth as needed for Erectile Dysfunction.          **MEDICATION INSTRUCTIONS FOR SURGERY/PROCEDURE SCHEDULED FOR 10/10/2024   DO NOT TAKE FOR 48 HOURS PRIOR TO SURGERY) 30 Tablet 3    Insulin Pen Needle (PEN NEEDLES) 32G X 5 MM Misc Use to inject insulin 4x daily subq 100 Each 3    insulin lispro (HUMALOG,ADMELOG) 100 UNIT/ML Solution Pen-injector injection PEN Inject 8 Units under the skin 3 times a day before meals.       **MEDICATION INSTRUCTIONS FOR SURGERY/PROCEDURE SCHEDULED FOR 10/10/2024   COORDINATE MEDICATION INSTRUCTIONS FROM PRESCRIBING PHYSICIAN      Continuous Blood  Gluc  (FREESTYLE DON 2 READER) Device Follow box instructions 1 Each 3    Multiple Vitamin (DAILY-JEANNA) Tab Take 1 Tablet by mouth every day.          **MEDICATION INSTRUCTIONS FOR SURGERY/PROCEDURE SCHEDULED FOR 10/10/2024   DO NOT TAKE 7 DAYS PRIOR TO SURGERY      glucosamine Sulfate 500 MG Cap Take 500 mg by mouth 3 times a day with meals. Once a day 1500 mg       **MEDICATION INSTRUCTIONS FOR SURGERY/PROCEDURE SCHEDULED FOR 10/10/2024   DO NOT TAKE 7 DAYS PRIOR TO SURGERY      aspirin (ASA) 81 MG Chew Tab chewable tablet Chew 81 mg every day.          **MEDICATION INSTRUCTIONS FOR SURGERY/PROCEDURE SCHEDULED FOR 10/10/2024   COORDINATE MEDICATION INSTRUCTIONS FROM PRESCRIBING PHYSICIAN.  PATIENT STATES LAST TAKEN ON 10/2/2024          Current Outpatient Medications on File Prior to Visit   Medication Sig Dispense Refill    atorvastatin (LIPITOR) 10 MG Tab Take 1 Tablet by mouth every day. 90 Tablet 3    loratadine (CLARITIN) 10 MG Tab Take 10 mg by mouth every day.        **MEDICATION INSTRUCTIONS FOR SURGERY/PROCEDURE SCHEDULED FOR 10/10/2024   OK TO CONTINUE TAKING PRIOR TO SURGERY AND DAY OF SURGERY      COLLAGEN PO Take  by mouth every day.          **MEDICATION INSTRUCTIONS FOR SURGERY/PROCEDURE SCHEDULED FOR 10/10/2024   DO NOT TAKE 7 DAYS PRIOR TO SURGERY      omeprazole (PRILOSEC) 40 MG delayed-release capsule Take 1 Capsule by mouth every day. (Patient taking differently: Take 40 mg by mouth every day.        **MEDICATION INSTRUCTIONS FOR SURGERY/PROCEDURE SCHEDULED FOR 10/10/2024.   OK TO CONTINUE TAKING PRIOR TO SURGERY AND DAY OF SURGERY) 90 Capsule 3    latanoprost (XALATAN) 0.005 % Solution        **MEDICATION INSTRUCTIONS FOR SURGERY/PROCEDURE SCHEDULED FOR 10/10/2024   OK TO CONTINUE TAKING PRIOR TO SURGERY AND NIGHT BEFORE SURGERY      Continuous Blood Gluc Sensor (FREESTYLE DON 14 DAY SENSOR) Misc 1 Each every 14 days. 6 Each 3    amLODIPine (NORVASC) 5 MG Tab Take 1 Tablet by mouth  every day. (Patient taking differently: Take 5 mg by mouth every day.          **MEDICATION INSTRUCTIONS FOR SURGERY/PROCEDURE SCHEDULED FOR 10/10/2024.   OK TO CONTINUE TAKING PRIOR TO SURGERY AND DAY OF SURGERY) 90 Tablet 3    Insulin Degludec (TRESIBA FLEXTOUCH) 100 UNIT/ML Solution Pen-injector INJECT 50 SUBCUTANEOUSLY ONCE DAILY (Patient taking differently: 10 Units. INJECT 50 SUBCUTANEOUSLY ONCE DAILY            **MEDICATION INSTRUCTIONS FOR SURGERY/PROCEDURE SCHEDULED FOR 10/10/2024   COORDINATE MEDICATION INSTRUCTIONS FROM PRESCRIBING PHYSICIAN) 15 mL 11    Glucagon (GVOKE HYPOPEN 1-PACK) 1 MG/0.2ML Solution Auto-injector Inject 1 mg under the skin one time as needed (hypoglycemia) for up to 1 dose. (Patient taking differently: Inject 1 mg under the skin one time as needed (hypoglycemia).          **MEDICATION INSTRUCTIONS FOR SURGERY/PROCEDURE SCHEDULED FOR 10/10/2024   OK TO CONTINUE TAKING AS PRESCRIBED) 0.2 mL 1    tadalafil (CIALIS) 20 MG tablet Take 1 Tablet by mouth as needed for Erectile Dysfunction. (Patient taking differently: Take 20 mg by mouth as needed for Erectile Dysfunction.          **MEDICATION INSTRUCTIONS FOR SURGERY/PROCEDURE SCHEDULED FOR 10/10/2024   DO NOT TAKE FOR 48 HOURS PRIOR TO SURGERY) 30 Tablet 3    Insulin Pen Needle (PEN NEEDLES) 32G X 5 MM Misc Use to inject insulin 4x daily subq 100 Each 3    insulin lispro (HUMALOG,ADMELOG) 100 UNIT/ML Solution Pen-injector injection PEN Inject 8 Units under the skin 3 times a day before meals.       **MEDICATION INSTRUCTIONS FOR SURGERY/PROCEDURE SCHEDULED FOR 10/10/2024   COORDINATE MEDICATION INSTRUCTIONS FROM PRESCRIBING PHYSICIAN      Continuous Blood Gluc  (FREESTYLE DON 2 READER) Device Follow box instructions 1 Each 3    Multiple Vitamin (DAILY-JEANNA) Tab Take 1 Tablet by mouth every day.          **MEDICATION INSTRUCTIONS FOR SURGERY/PROCEDURE SCHEDULED FOR 10/10/2024   DO NOT TAKE 7 DAYS PRIOR TO SURGERY       glucosamine Sulfate 500 MG Cap Take 500 mg by mouth 3 times a day with meals. Once a day 1500 mg       **MEDICATION INSTRUCTIONS FOR SURGERY/PROCEDURE SCHEDULED FOR 10/10/2024   DO NOT TAKE 7 DAYS PRIOR TO SURGERY      aspirin (ASA) 81 MG Chew Tab chewable tablet Chew 81 mg every day.          **MEDICATION INSTRUCTIONS FOR SURGERY/PROCEDURE SCHEDULED FOR 10/10/2024   COORDINATE MEDICATION INSTRUCTIONS FROM PRESCRIBING PHYSICIAN.  PATIENT STATES LAST TAKEN ON 10/2/2024      tizanidine (ZANAFLEX) 4 MG Tab Take 0.5-1 Tablets by mouth at bedtime as needed (muscle spasms). (Patient not taking: Reported on 10/4/2024) 30 Tablet 2    insulin aspart (NOVOLOG FLEXPEN) 100 UNIT/ML injection PEN Inject 10 Units under the skin 3 times a day before meals for 360 days. (Patient not taking: Reported on 10/4/2024) 9 mL 11     No current facility-administered medications on file prior to visit.         Allergies:   No Known Allergies    Social Hx:   Social History     Socioeconomic History    Marital status:      Spouse name: Not on file    Number of children: Not on file    Years of education: Not on file    Highest education level: Bachelor's degree (e.g., BA, AB, BS)   Occupational History    Not on file   Tobacco Use    Smoking status: Never    Smokeless tobacco: Never   Vaping Use    Vaping status: Never Used   Substance and Sexual Activity    Alcohol use: Yes     Alcohol/week: 1.2 oz     Types: 2 Cans of beer per week     Comment: Occ. beer or wine    Drug use: Never    Sexual activity: Yes     Partners: Female   Other Topics Concern    Not on file   Social History Narrative    Not on file     Social Drivers of Health     Financial Resource Strain: Low Risk  (12/9/2023)    Overall Financial Resource Strain (CARDIA)     Difficulty of Paying Living Expenses: Not hard at all   Food Insecurity: No Food Insecurity (12/9/2023)    Hunger Vital Sign     Worried About Running Out of Food in the Last Year: Never true     Ran Out  of Food in the Last Year: Never true   Transportation Needs: No Transportation Needs (12/9/2023)    PRAPARE - Transportation     Lack of Transportation (Medical): No     Lack of Transportation (Non-Medical): No   Physical Activity: Sufficiently Active (12/9/2023)    Exercise Vital Sign     Days of Exercise per Week: 7 days     Minutes of Exercise per Session: 50 min   Stress: No Stress Concern Present (12/9/2023)    [employer] [place] [employer] - Occupational Stress Questionnaire     Feeling of Stress : Not at all   Social Connections: Socially Integrated (12/9/2023)    Social Connection and Isolation Panel [NHANES]     Frequency of Communication with Friends and Family: More than three times a week     Frequency of Social Gatherings with Friends and Family: More than three times a week     Attends Jewish Services: 1 to 4 times per year     Active Member of Clubs or Organizations: Yes     Attends Club or Organization Meetings: More than 4 times per year     Marital Status:    Intimate Partner Violence: Not on file   Housing Stability: Low Risk  (12/9/2023)    Housing Stability Vital Sign     Unable to Pay for Housing in the Last Year: No     Number of Places Lived in the Last Year: 1     Unstable Housing in the Last Year: No         EXAMINATION     Physical Exam:   Vitals: BP (!) 148/84 (BP Location: Right arm, Patient Position: Sitting, BP Cuff Size: Adult)   Pulse (!) 52   Temp 36.7 °C (98 °F) (Temporal)   Ht 1.829 m (6')   Wt 77.1 kg (169 lb 15.6 oz)   SpO2 95%     Constitutional:   Body Habitus: Body mass index is 23.05 kg/m².  Cooperation: Fully cooperates with exam  Appearance: Well-groomed, well-nourished.     Eyes: No scleral icterus to suggest severe liver disease, no proptosis to suggest severe hyperthyroidism     ENT -no obvious auditory deficits, no noticeable facial droop      Skin -no rashes or lesions noted      Respiratory-  breathing comfortably on room air, no audible  "wheezing     Cardiovascular-distal extremities warm and well perfused.  No lower extremity edema is noted.      Gastrointestinal - no obvious abdominal masses, non-distended     Psychiatric- alert and oriented ×3. Normal affect.      Gait - normal gait, no use of ambulatory device, nonantalgic.      Musculoskeletal and Neuro -      Thoracic/Lumbar Spine/Sacral Spine/Hips   Inspection: No evidence of atrophy in bilateral lower extremities throughout      Palpation:   Tenderness to palpation over the bilateral lumbar facets      Facet loading maneuver positive bilaterally    Lumbar spine /hip provocative exam maneuvers  Straight leg raise negative bilaterally  FADIR test and log roll negative bilaterally     SI joint tests  GRACIE test on right reproduces pain at approximate location of right gluteus medius.  Negative on left        Key points for the international standards for neurological classification of spinal cord injury (ISNCSCI) to light touch.   Dermatome R L   L2 2 2   L3 2 2   L4 2 2   L5 2 2   S1 2 2   S2 2 2         Motor Exam Lower Extremities  ? Myotome R L   Hip flexion L2 5 5   Knee extension L3 5 5   Ankle dorsiflexion L4 5 5   Toe extension L5 5 5   Ankle plantarflexion S1 5 5      Previous exam  Reflexes  ?   R L   Patella   1+ 1+   Achilles    1+ 1+      Clonus of the ankle negative bilaterally                MEDICAL DECISION MAKING    DATA    Labs:   Lab Results   Component Value Date/Time    SODIUM 142 10/04/2024 11:21 AM    POTASSIUM 4.2 10/04/2024 11:21 AM    CHLORIDE 107 10/04/2024 11:21 AM    CO2 26 10/04/2024 11:21 AM    GLUCOSE 114 (H) 10/04/2024 11:21 AM    BUN 23 (H) 10/04/2024 11:21 AM    CREATININE 0.92 10/04/2024 11:21 AM        No results found for: \"PROTHROMBTM\", \"INR\"     Lab Results   Component Value Date/Time    WBC 6.7 02/29/2024 07:34 AM    RBC 4.95 02/29/2024 07:34 AM    HEMOGLOBIN 15.4 02/29/2024 07:34 AM    HEMATOCRIT 45.9 02/29/2024 07:34 AM    MCV 92.7 02/29/2024 07:34 AM "    MCH 31.1 02/29/2024 07:34 AM    MCHC 33.6 02/29/2024 07:34 AM    MPV 11.1 02/29/2024 07:34 AM    NEUTSPOLYS 45.30 02/29/2024 07:34 AM    LYMPHOCYTES 40.50 02/29/2024 07:34 AM    MONOCYTES 9.50 02/29/2024 07:34 AM    EOSINOPHILS 4.20 02/29/2024 07:34 AM    BASOPHILS 0.40 02/29/2024 07:34 AM        Lab Results   Component Value Date/Time    HBA1C 6.7 (H) 09/11/2024 03:19 PM        EMG 3/24/2023  Impression:  This is a normal electrodiagnostic study.  There is no electrodiagnostic evidence of a generalized peripheral polyneuropathy, right median or ulnar mononeuropathy, sural mononeuropathy on either side, tibial mononeuropathy on either side, fibular mononeuropathy on either side, or lumbar plexopathy on either side.        Imaging:   I personally reviewed following images  XR lumbar spine 6/7/23  Facet arthropathy of the bilateral lower lumbar levels.  Anterior body osteophyte at L4 and L5.  Disc space narrowing most notable at L3-4, L4-5, and L5-S1. See formal radiology report for further details.    XR hips 6/7/23  Moderate OA bilaterally. See formal radiology report for further details.      I reviewed the following radiology reports    X-ray hip 6/22/2017  AP pelvis and left lateral hip films were ordered performed and   interpreted by us today and reveal moderate heart osteoarthritic changes   in both hips with femoral head flattening noticed bilaterally with the   right mi reviewed left and sclerotic changes at the superior acetabular   dome bilaterally and early spur formation superolaterally consistent with   osteoarthritis.                                 XR lumbar spine 6/7/23  FINDINGS:  Vertebral body height is well maintained.  There is no evidence of fracture.  Vertebral alignment is normal.  There is moderate to severe degenerative disc disease and facet arthropathy mainly at the L3-S1 levels.        IMPRESSION:     1.  No compression deformity or acute fracture is identified.     2.  There is  significant degenerative disc disease and facet arthropathy throughout the lower lumbar spine.             XR hips 23  FINDINGS:  There is no evidence of acute fracture involving the pelvis. No proximal femoral fracture is identified.  There is moderate joint space narrowing. A core sclerosis and marginal spurring. There is mild flattening of the right femoral head. There is some linear lucency within the right femoral head.     IMPRESSION:     1.  No radiographic evidence of acute traumatic injury.     2.  Findings are consistent with moderate osteoarthritis bilaterally.     3.  There is some flattening of the right femoral head as well as some lucency in the mid femoral head area. Avascular necrosis is a possibility. Prior right femoral head fracture is also possible.           DIAGNOSIS   Visit Diagnoses     ICD-10-CM   1. Chronic bilateral low back pain without sciatica  M54.50    G89.29   2. Lumbar spondylosis  M47.816   3. Myalgia  M79.10   4. Gluteal pain  M79.18             ASSESSMENT and PLAN:     Sukumar Tyson  1955 male with ongoing low back pain and worsened pain at his left gluteal area that could be consistent with lumbar radicular pain    Exam today notable for tenderness to palpation at bilateral lower lumbar facet joints with exam notable for positive lumbar facet loading maneuver bilaterally reproducing the patient's pain, suggestive of lumbar facetogenic pain.  Imaging shows lumbar spondylosis at bilateral lower lumbar levels.    Does not appear to have hip joint mediated pain at this time, with negative FADIR and log roll and no hx of groin pain.    Darrius was seen today for follow-up.    Diagnoses and all orders for this visit:    Chronic bilateral low back pain without sciatica  -     MR-LUMBAR SPINE-W/O; Future    Lumbar spondylosis  -     MR-LUMBAR SPINE-W/O; Future    Myalgia    Gluteal pain            Physical Therapy: At this time, the patient has had persistent pain  despite many months of formal physical therapy    Diagnostic workup: Order MRI lumbar spine for further evaluation    Medications:   -Continue tizanidine and mobic PRN which he has taken with some relief.    Interventions:    -S/p trigger point injections most recently with limited relief  - discussed trial of diagnostic lumbar medial branch blocks if no improvement in low back pain after today's injections, and if the patient feels back pain is persistent and severe enough to warrant a procedure   -Discussed possible epidural if his sciatica pain returns and is severe enough to warrant an injection    Follow up: After MRI    Haley Herron MD  Interventional Pain and Spine  Physical Medicine and Rehabilitation  [facility]

## 2024-11-22 ENCOUNTER — APPOINTMENT (OUTPATIENT)
Dept: RADIOLOGY | Facility: MEDICAL CENTER | Age: 69
End: 2024-11-22
Attending: STUDENT IN AN ORGANIZED HEALTH CARE EDUCATION/TRAINING PROGRAM
Payer: MEDICARE

## 2024-12-14 ENCOUNTER — TELEPHONE (OUTPATIENT)
Dept: MEDICAL GROUP | Facility: LAB | Age: 69
End: 2024-12-14
Payer: MEDICARE

## 2024-12-14 ENCOUNTER — HOSPITAL ENCOUNTER (OUTPATIENT)
Dept: RADIOLOGY | Facility: MEDICAL CENTER | Age: 69
End: 2024-12-14
Attending: STUDENT IN AN ORGANIZED HEALTH CARE EDUCATION/TRAINING PROGRAM
Payer: MEDICARE

## 2024-12-14 DIAGNOSIS — M47.816 LUMBAR SPONDYLOSIS: ICD-10-CM

## 2024-12-14 DIAGNOSIS — G89.29 CHRONIC BILATERAL LOW BACK PAIN WITHOUT SCIATICA: ICD-10-CM

## 2024-12-14 DIAGNOSIS — M54.50 CHRONIC BILATERAL LOW BACK PAIN WITHOUT SCIATICA: ICD-10-CM

## 2024-12-14 PROCEDURE — 72148 MRI LUMBAR SPINE W/O DYE: CPT

## 2024-12-16 ENCOUNTER — HOSPITAL ENCOUNTER (OUTPATIENT)
Dept: RADIOLOGY | Facility: MEDICAL CENTER | Age: 69
End: 2024-12-16
Attending: INTERNAL MEDICINE
Payer: MEDICARE

## 2024-12-16 DIAGNOSIS — R09.89 ABNORMAL CHEST SOUNDS: ICD-10-CM

## 2024-12-16 DIAGNOSIS — J38.1 POLYP OF LARYNX: ICD-10-CM

## 2024-12-16 PROCEDURE — 700112 HCHG RX REV CODE 229: Performed by: INTERNAL MEDICINE

## 2024-12-16 PROCEDURE — 700117 HCHG RX CONTRAST REV CODE 255: Performed by: INTERNAL MEDICINE

## 2024-12-16 PROCEDURE — 74221 X-RAY XM ESOPHAGUS 2CNTRST: CPT

## 2024-12-16 PROCEDURE — A9270 NON-COVERED ITEM OR SERVICE: HCPCS | Performed by: INTERNAL MEDICINE

## 2024-12-16 RX ADMIN — BARIUM SULFATE 700 MG: 700 TABLET ORAL at 15:00

## 2024-12-16 RX ADMIN — ANTACID/ANTIFLATULENT 1 PACKET: 380; 550; 10; 10 GRANULE, EFFERVESCENT ORAL at 15:00

## 2024-12-20 ENCOUNTER — HOSPITAL ENCOUNTER (OUTPATIENT)
Dept: LAB | Facility: MEDICAL CENTER | Age: 69
End: 2024-12-20
Attending: INTERNAL MEDICINE
Payer: MEDICARE

## 2024-12-20 DIAGNOSIS — E10.9 TYPE 1 DIABETES MELLITUS WITHOUT COMPLICATION (HCC): ICD-10-CM

## 2024-12-20 DIAGNOSIS — H40.9 GLAUCOMA OF BOTH EYES, UNSPECIFIED GLAUCOMA TYPE: ICD-10-CM

## 2024-12-20 LAB
EST. AVERAGE GLUCOSE BLD GHB EST-MCNC: 148 MG/DL
HBA1C MFR BLD: 6.8 % (ref 4–5.6)

## 2024-12-20 PROCEDURE — 83036 HEMOGLOBIN GLYCOSYLATED A1C: CPT | Mod: GA

## 2024-12-20 PROCEDURE — 36415 COLL VENOUS BLD VENIPUNCTURE: CPT | Mod: GA

## 2024-12-20 PROCEDURE — 82043 UR ALBUMIN QUANTITATIVE: CPT

## 2024-12-20 PROCEDURE — 82570 ASSAY OF URINE CREATININE: CPT

## 2024-12-21 LAB
CREAT UR-MCNC: 282.19 MG/DL
MICROALBUMIN UR-MCNC: 1.8 MG/DL
MICROALBUMIN/CREAT UR: 6 MG/G (ref 0–30)

## 2025-01-08 ENCOUNTER — OFFICE VISIT (OUTPATIENT)
Dept: PHYSICAL MEDICINE AND REHAB | Facility: MEDICAL CENTER | Age: 70
End: 2025-01-08
Payer: MEDICARE

## 2025-01-08 VITALS
DIASTOLIC BLOOD PRESSURE: 80 MMHG | HEIGHT: 72 IN | BODY MASS INDEX: 23.23 KG/M2 | SYSTOLIC BLOOD PRESSURE: 162 MMHG | WEIGHT: 171.52 LBS | TEMPERATURE: 97.9 F | OXYGEN SATURATION: 98 % | HEART RATE: 54 BPM

## 2025-01-08 DIAGNOSIS — G89.29 CHRONIC BILATERAL LOW BACK PAIN WITHOUT SCIATICA: ICD-10-CM

## 2025-01-08 DIAGNOSIS — M25.552 CHRONIC PAIN OF BOTH HIPS: ICD-10-CM

## 2025-01-08 DIAGNOSIS — M79.10 MYALGIA: ICD-10-CM

## 2025-01-08 DIAGNOSIS — M54.50 ACUTE BILATERAL LOW BACK PAIN WITHOUT SCIATICA: ICD-10-CM

## 2025-01-08 DIAGNOSIS — G89.29 CHRONIC PAIN OF BOTH HIPS: ICD-10-CM

## 2025-01-08 DIAGNOSIS — M25.551 CHRONIC PAIN OF BOTH HIPS: ICD-10-CM

## 2025-01-08 DIAGNOSIS — M16.10 ARTHRITIS OF HIP: ICD-10-CM

## 2025-01-08 DIAGNOSIS — M47.816 LUMBAR SPONDYLOSIS: ICD-10-CM

## 2025-01-08 DIAGNOSIS — M54.50 CHRONIC BILATERAL LOW BACK PAIN WITHOUT SCIATICA: ICD-10-CM

## 2025-01-08 DIAGNOSIS — M79.18 GLUTEAL PAIN: ICD-10-CM

## 2025-01-08 DIAGNOSIS — M67.951 TENDINOPATHY OF RIGHT GLUTEUS MEDIUS: ICD-10-CM

## 2025-01-08 PROCEDURE — 3079F DIAST BP 80-89 MM HG: CPT | Performed by: STUDENT IN AN ORGANIZED HEALTH CARE EDUCATION/TRAINING PROGRAM

## 2025-01-08 PROCEDURE — 3077F SYST BP >= 140 MM HG: CPT | Performed by: STUDENT IN AN ORGANIZED HEALTH CARE EDUCATION/TRAINING PROGRAM

## 2025-01-08 PROCEDURE — 99214 OFFICE O/P EST MOD 30 MIN: CPT | Performed by: STUDENT IN AN ORGANIZED HEALTH CARE EDUCATION/TRAINING PROGRAM

## 2025-01-08 PROCEDURE — G2211 COMPLEX E/M VISIT ADD ON: HCPCS | Performed by: STUDENT IN AN ORGANIZED HEALTH CARE EDUCATION/TRAINING PROGRAM

## 2025-01-08 PROCEDURE — 1125F AMNT PAIN NOTED PAIN PRSNT: CPT | Performed by: STUDENT IN AN ORGANIZED HEALTH CARE EDUCATION/TRAINING PROGRAM

## 2025-01-08 ASSESSMENT — PAIN SCALES - GENERAL: PAINLEVEL_OUTOF10: 5=MODERATE PAIN

## 2025-01-08 ASSESSMENT — PATIENT HEALTH QUESTIONNAIRE - PHQ9: CLINICAL INTERPRETATION OF PHQ2 SCORE: 0

## 2025-01-08 ASSESSMENT — FIBROSIS 4 INDEX: FIB4 SCORE: 1.47

## 2025-01-08 NOTE — PROGRESS NOTES
Verbal consent was acquired by the patient to use [employer] ambient listening note generation during this visit Yes     Follow up patient note  Interventional spine and Pain  Physiatry (physical medicine and  Rehabilitation)       Chief complaint:   Chief Complaint   Patient presents with    Follow-Up     MRI Review          HISTORY (2/17/2023):  Sukumar Tyson is a 67 y.o. male who presents today with a dull constant discomfort in his upper thighs for a couple of months and left big toe for several years. This doesn't limit him from doing activities. He is still able to ski without difficulty or exacerbation of pain. He denies noticeable numbness/tingling or weakness in his legs. He notes a history of right sciatica but denies current symptoms of this.  He was referred for an EMG by his PCP. He remembers getting an EMG at least 20 years ago in [place] which was normal. Notes that his A1c has typically been in the 6% range but his most recent A1c was 7.4%. This is the highest it's been.      Pain right now is 2/10 on the numeric pain scale. His pain at its best-worse level during the course of the day is typically 2-3/10, respectively.  Pain worsens with sitting and side bending or twisting and improves with standing, walking, bending forward, and bending backwards. His pain does not interfere with ADLs. The patient denies weakness, numbness, or bladder/bowel incontinence.     Notes that he has been limited by low back pain and hip pain in the past.  An x-ray in 2017 showed signs of very mild hip OA.  He remembers seeing a doctor who mentioned that his pain could be related to OA.  He has not done dedicated physical therapy for this in the past.     The patient has not done physical therapy for this problem. Has done PT for his back and still does home exercise program      Patient has tried the following medications with varied success (current meds in bold):   Oral naproxen PRN prior to physical activity  primarily for back and hip pain     Medical history includes T1DM, HLD, HTN.    HPI  Patient identification: Sukumar Tyson ,  1955,   With Diagnoses of Lumbar spondylosis, Chronic bilateral low back pain without sciatica, Myalgia, Gluteal pain, Tendinopathy of right gluteus medius, Acute bilateral low back pain without sciatica, Chronic pain of both hips, and Arthritis of hip were pertinent to this visit.     History of Present Illness  The patient presents for evaluation of back pain and knee pain.    He reports persistent back pain, which remains unchanged since his last visit. He has been engaging in stretching exercises as part of his physical therapy regimen, but he is not going anymore because they discharged him. He has attended over 10 sessions. He does not experience any radiating leg pain associated with his back condition. He also reports no current symptoms of sciatica, a condition he experienced several years ago. The pain intensifies when transitioning from a seated to a standing position, making it challenging to initiate movement.    He recently sustained a left knee injury while skiing, initially causing wrist pain upon standing. The following day, he noticed a cracking sound in his knee during flexion, which subsequently resolved and was replaced by mild pain. Despite this, he continued to ski. He reports no swelling in the knee.    Procedure history:  -5/15/2023 trigger point injections with Dr. Watters  -significant relief.   -24 trigger point injections -1 day of relief     ROS Red Flags :   Fever, Chills, Sweats: Denies  Involuntary Weight Loss: Denies  Bowel/Bladder Incontinence: Denies  Saddle Anesthesia: Denies        PMHx:   Past Medical History:   Diagnosis Date    Arrhythmia 10/04/2024    History of SVT 20 years ago.    Diabetes (HCC)     GERD (gastroesophageal reflux disease)     Glaucoma     High cholesterol     HTN (hypertension)     Neuropathy        PSHx:    Past Surgical History:   Procedure Laterality Date    OR LARYNGOSCOPY,DIRCT,OP SCOP,EXC TUMR Right 10/10/2024    Procedure: RIGHT MICRO LARYNGOSCOPY WITH TRUE VOCAL CORD STRIPPING, LASER;  Surgeon: Roni Vidales M.D.;  Location: SURGERY SAME DAY [facility];  Service: Ent    OTHER CARDIAC SURGERY  10/04/2024    Ablation 20 years ago for SVT    TONSILLECTOMY      as a child       Family history   Family History   Problem Relation Age of Onset    No Known Problems Mother     No Known Problems Father          Medications:   Outpatient Medications Marked as Taking for the 1/8/25 encounter (Office Visit) with Haley Herron M.D.   Medication Sig Dispense Refill    atorvastatin (LIPITOR) 10 MG Tab Take 1 Tablet by mouth every day. 90 Tablet 3    loratadine (CLARITIN) 10 MG Tab Take 10 mg by mouth every day.        **MEDICATION INSTRUCTIONS FOR SURGERY/PROCEDURE SCHEDULED FOR 10/10/2024   OK TO CONTINUE TAKING PRIOR TO SURGERY AND DAY OF SURGERY      COLLAGEN PO Take  by mouth every day.          **MEDICATION INSTRUCTIONS FOR SURGERY/PROCEDURE SCHEDULED FOR 10/10/2024   DO NOT TAKE 7 DAYS PRIOR TO SURGERY      omeprazole (PRILOSEC) 40 MG delayed-release capsule Take 1 Capsule by mouth every day. (Patient taking differently: Take 40 mg by mouth every day.        **MEDICATION INSTRUCTIONS FOR SURGERY/PROCEDURE SCHEDULED FOR 10/10/2024.   OK TO CONTINUE TAKING PRIOR TO SURGERY AND DAY OF SURGERY) 90 Capsule 3    latanoprost (XALATAN) 0.005 % Solution        **MEDICATION INSTRUCTIONS FOR SURGERY/PROCEDURE SCHEDULED FOR 10/10/2024   OK TO CONTINUE TAKING PRIOR TO SURGERY AND NIGHT BEFORE SURGERY      Continuous Blood Gluc Sensor (FREESTYLE DON 14 DAY SENSOR) Misc 1 Each every 14 days. 6 Each 3    amLODIPine (NORVASC) 5 MG Tab Take 1 Tablet by mouth every day. (Patient taking differently: Take 5 mg by mouth every day.          **MEDICATION INSTRUCTIONS FOR SURGERY/PROCEDURE SCHEDULED FOR 10/10/2024.   OK TO CONTINUE TAKING  PRIOR TO SURGERY AND DAY OF SURGERY) 90 Tablet 3    Insulin Degludec (TRESIBA FLEXTOUCH) 100 UNIT/ML Solution Pen-injector INJECT 50 SUBCUTANEOUSLY ONCE DAILY (Patient taking differently: 10 Units. INJECT 50 SUBCUTANEOUSLY ONCE DAILY            **MEDICATION INSTRUCTIONS FOR SURGERY/PROCEDURE SCHEDULED FOR 10/10/2024   COORDINATE MEDICATION INSTRUCTIONS FROM PRESCRIBING PHYSICIAN) 15 mL 11    Glucagon (GVOKE HYPOPEN 1-PACK) 1 MG/0.2ML Solution Auto-injector Inject 1 mg under the skin one time as needed (hypoglycemia) for up to 1 dose. (Patient taking differently: Inject 1 mg under the skin one time as needed (hypoglycemia).          **MEDICATION INSTRUCTIONS FOR SURGERY/PROCEDURE SCHEDULED FOR 10/10/2024   OK TO CONTINUE TAKING AS PRESCRIBED) 0.2 mL 1    tadalafil (CIALIS) 20 MG tablet Take 1 Tablet by mouth as needed for Erectile Dysfunction. (Patient taking differently: Take 20 mg by mouth as needed for Erectile Dysfunction.          **MEDICATION INSTRUCTIONS FOR SURGERY/PROCEDURE SCHEDULED FOR 10/10/2024   DO NOT TAKE FOR 48 HOURS PRIOR TO SURGERY) 30 Tablet 3    Insulin Pen Needle (PEN NEEDLES) 32G X 5 MM Misc Use to inject insulin 4x daily subq 100 Each 3    insulin lispro (HUMALOG,ADMELOG) 100 UNIT/ML Solution Pen-injector injection PEN Inject 8 Units under the skin 3 times a day before meals.       **MEDICATION INSTRUCTIONS FOR SURGERY/PROCEDURE SCHEDULED FOR 10/10/2024   COORDINATE MEDICATION INSTRUCTIONS FROM PRESCRIBING PHYSICIAN      Continuous Blood Gluc  (FREESTYLE DON 2 READER) Device Follow box instructions 1 Each 3    Multiple Vitamin (DAILY-JEANNA) Tab Take 1 Tablet by mouth every day.          **MEDICATION INSTRUCTIONS FOR SURGERY/PROCEDURE SCHEDULED FOR 10/10/2024   DO NOT TAKE 7 DAYS PRIOR TO SURGERY      glucosamine Sulfate 500 MG Cap Take 500 mg by mouth 3 times a day with meals. Once a day 1500 mg       **MEDICATION INSTRUCTIONS FOR SURGERY/PROCEDURE SCHEDULED FOR 10/10/2024   DO NOT  TAKE 7 DAYS PRIOR TO SURGERY      aspirin (ASA) 81 MG Chew Tab chewable tablet Chew 81 mg every day.          **MEDICATION INSTRUCTIONS FOR SURGERY/PROCEDURE SCHEDULED FOR 10/10/2024   COORDINATE MEDICATION INSTRUCTIONS FROM PRESCRIBING PHYSICIAN.  PATIENT STATES LAST TAKEN ON 10/2/2024          Current Outpatient Medications on File Prior to Visit   Medication Sig Dispense Refill    atorvastatin (LIPITOR) 10 MG Tab Take 1 Tablet by mouth every day. 90 Tablet 3    loratadine (CLARITIN) 10 MG Tab Take 10 mg by mouth every day.        **MEDICATION INSTRUCTIONS FOR SURGERY/PROCEDURE SCHEDULED FOR 10/10/2024   OK TO CONTINUE TAKING PRIOR TO SURGERY AND DAY OF SURGERY      COLLAGEN PO Take  by mouth every day.          **MEDICATION INSTRUCTIONS FOR SURGERY/PROCEDURE SCHEDULED FOR 10/10/2024   DO NOT TAKE 7 DAYS PRIOR TO SURGERY      omeprazole (PRILOSEC) 40 MG delayed-release capsule Take 1 Capsule by mouth every day. (Patient taking differently: Take 40 mg by mouth every day.        **MEDICATION INSTRUCTIONS FOR SURGERY/PROCEDURE SCHEDULED FOR 10/10/2024.   OK TO CONTINUE TAKING PRIOR TO SURGERY AND DAY OF SURGERY) 90 Capsule 3    latanoprost (XALATAN) 0.005 % Solution        **MEDICATION INSTRUCTIONS FOR SURGERY/PROCEDURE SCHEDULED FOR 10/10/2024   OK TO CONTINUE TAKING PRIOR TO SURGERY AND NIGHT BEFORE SURGERY      Continuous Blood Gluc Sensor (FREESTYLE DON 14 DAY SENSOR) Misc 1 Each every 14 days. 6 Each 3    amLODIPine (NORVASC) 5 MG Tab Take 1 Tablet by mouth every day. (Patient taking differently: Take 5 mg by mouth every day.          **MEDICATION INSTRUCTIONS FOR SURGERY/PROCEDURE SCHEDULED FOR 10/10/2024.   OK TO CONTINUE TAKING PRIOR TO SURGERY AND DAY OF SURGERY) 90 Tablet 3    Insulin Degludec (TRESIBA FLEXTOUCH) 100 UNIT/ML Solution Pen-injector INJECT 50 SUBCUTANEOUSLY ONCE DAILY (Patient taking differently: 10 Units. INJECT 50 SUBCUTANEOUSLY ONCE DAILY            **MEDICATION INSTRUCTIONS FOR  SURGERY/PROCEDURE SCHEDULED FOR 10/10/2024   COORDINATE MEDICATION INSTRUCTIONS FROM PRESCRIBING PHYSICIAN) 15 mL 11    Glucagon (GVOKE HYPOPEN 1-PACK) 1 MG/0.2ML Solution Auto-injector Inject 1 mg under the skin one time as needed (hypoglycemia) for up to 1 dose. (Patient taking differently: Inject 1 mg under the skin one time as needed (hypoglycemia).          **MEDICATION INSTRUCTIONS FOR SURGERY/PROCEDURE SCHEDULED FOR 10/10/2024   OK TO CONTINUE TAKING AS PRESCRIBED) 0.2 mL 1    tadalafil (CIALIS) 20 MG tablet Take 1 Tablet by mouth as needed for Erectile Dysfunction. (Patient taking differently: Take 20 mg by mouth as needed for Erectile Dysfunction.          **MEDICATION INSTRUCTIONS FOR SURGERY/PROCEDURE SCHEDULED FOR 10/10/2024   DO NOT TAKE FOR 48 HOURS PRIOR TO SURGERY) 30 Tablet 3    Insulin Pen Needle (PEN NEEDLES) 32G X 5 MM Misc Use to inject insulin 4x daily subq 100 Each 3    insulin lispro (HUMALOG,ADMELOG) 100 UNIT/ML Solution Pen-injector injection PEN Inject 8 Units under the skin 3 times a day before meals.       **MEDICATION INSTRUCTIONS FOR SURGERY/PROCEDURE SCHEDULED FOR 10/10/2024   COORDINATE MEDICATION INSTRUCTIONS FROM PRESCRIBING PHYSICIAN      Continuous Blood Gluc  (FREESTYLE DON 2 READER) Device Follow box instructions 1 Each 3    Multiple Vitamin (DAILY-JEANNA) Tab Take 1 Tablet by mouth every day.          **MEDICATION INSTRUCTIONS FOR SURGERY/PROCEDURE SCHEDULED FOR 10/10/2024   DO NOT TAKE 7 DAYS PRIOR TO SURGERY      glucosamine Sulfate 500 MG Cap Take 500 mg by mouth 3 times a day with meals. Once a day 1500 mg       **MEDICATION INSTRUCTIONS FOR SURGERY/PROCEDURE SCHEDULED FOR 10/10/2024   DO NOT TAKE 7 DAYS PRIOR TO SURGERY      aspirin (ASA) 81 MG Chew Tab chewable tablet Chew 81 mg every day.          **MEDICATION INSTRUCTIONS FOR SURGERY/PROCEDURE SCHEDULED FOR 10/10/2024   COORDINATE MEDICATION INSTRUCTIONS FROM PRESCRIBING PHYSICIAN.  PATIENT STATES LAST TAKEN ON  10/2/2024      tizanidine (ZANAFLEX) 4 MG Tab Take 0.5-1 Tablets by mouth at bedtime as needed (muscle spasms). (Patient not taking: Reported on 10/4/2024) 30 Tablet 2     No current facility-administered medications on file prior to visit.         Allergies:   No Known Allergies    Social Hx:   Social History     Socioeconomic History    Marital status:      Spouse name: Not on file    Number of children: Not on file    Years of education: Not on file    Highest education level: Bachelor's degree (e.g., BA, AB, BS)   Occupational History    Not on file   Tobacco Use    Smoking status: Never    Smokeless tobacco: Never   Vaping Use    Vaping status: Never Used   Substance and Sexual Activity    Alcohol use: Yes     Alcohol/week: 1.2 oz     Types: 2 Cans of beer per week     Comment: Occ. beer or wine    Drug use: Never    Sexual activity: Yes     Partners: Female   Other Topics Concern    Not on file   Social History Narrative    Not on file     Social Drivers of Health     Financial Resource Strain: Low Risk  (12/9/2023)    Overall Financial Resource Strain (CARDIA)     Difficulty of Paying Living Expenses: Not hard at all   Food Insecurity: No Food Insecurity (12/9/2023)    Hunger Vital Sign     Worried About Running Out of Food in the Last Year: Never true     Ran Out of Food in the Last Year: Never true   Transportation Needs: No Transportation Needs (12/9/2023)    PRAPARE - Transportation     Lack of Transportation (Medical): No     Lack of Transportation (Non-Medical): No   Physical Activity: Sufficiently Active (12/9/2023)    Exercise Vital Sign     Days of Exercise per Week: 7 days     Minutes of Exercise per Session: 50 min   Stress: No Stress Concern Present (12/9/2023)    [employer] [place] [employer] - Occupational Stress Questionnaire     Feeling of Stress : Not at all   Social Connections: Socially Integrated (12/9/2023)    Social Connection and Isolation Panel [NHANES]     Frequency  of Communication with Friends and Family: More than three times a week     Frequency of Social Gatherings with Friends and Family: More than three times a week     Attends [employer] Services: 1 to 4 times per year     Active Member of Clubs or Organizations: Yes     Attends Club or Organization Meetings: More than 4 times per year     Marital Status:    Intimate Partner Violence: Not on file   Housing Stability: Low Risk  (12/9/2023)    Housing Stability Vital Sign     Unable to Pay for Housing in the Last Year: No     Number of Places Lived in the Last Year: 1     Unstable Housing in the Last Year: No         EXAMINATION     Physical Exam:   Vitals: BP (!) 162/80 (BP Location: Right arm, Patient Position: Sitting, BP Cuff Size: Adult)   Pulse (!) 54   Temp 36.6 °C (97.9 °F) (Temporal)   Ht 1.829 m (6')   Wt 77.8 kg (171 lb 8.3 oz)   SpO2 98%     Constitutional:   Body Habitus: Body mass index is 23.26 kg/m².  Cooperation: Fully cooperates with exam  Appearance: Well-groomed, well-nourished.     Eyes: No scleral icterus to suggest severe liver disease, no proptosis to suggest severe hyperthyroidism     ENT -no obvious auditory deficits, no noticeable facial droop      Skin -no rashes or lesions noted      Respiratory-  breathing comfortably on room air, no audible wheezing     Cardiovascular-distal extremities warm and well perfused.  No lower extremity edema is noted.      Gastrointestinal - no obvious abdominal masses, non-distended     Psychiatric- alert and oriented ×3. Normal affect.      Gait - normal gait, no use of ambulatory device, nonantalgic.      Musculoskeletal and Neuro -      Thoracic/Lumbar Spine/Sacral Spine/Hips   Inspection: No evidence of atrophy in bilateral lower extremities throughout      Palpation:   Tenderness to palpation over the bilateral lumbar facets      Facet loading maneuver positive bilaterally    Lumbar spine /hip provocative exam maneuvers  Straight leg raise  "negative bilaterally  FADIR test negative bilaterally     SI joint tests  GRACIE test negative bilaterally        Key points for the international standards for neurological classification of spinal cord injury (ISNCSCI) to light touch.   Dermatome R L   L2 2 2   L3 2 2   L4 2 2   L5 2 2   S1 2 2   S2 2 2         Motor Exam Lower Extremities  ? Myotome R L   Hip flexion L2 5 5   Knee extension L3 5 5   Ankle dorsiflexion L4 5 5   Toe extension L5 5 5   Ankle plantarflexion S1 5 5      Previous exam  Reflexes  ?   R L   Patella   1+ 1+   Achilles    1+ 1+      Clonus of the ankle negative bilaterally                MEDICAL DECISION MAKING    DATA    Labs:   Lab Results   Component Value Date/Time    SODIUM 142 10/04/2024 11:21 AM    POTASSIUM 4.2 10/04/2024 11:21 AM    CHLORIDE 107 10/04/2024 11:21 AM    CO2 26 10/04/2024 11:21 AM    GLUCOSE 114 (H) 10/04/2024 11:21 AM    BUN 23 (H) 10/04/2024 11:21 AM    CREATININE 0.92 10/04/2024 11:21 AM        No results found for: \"PROTHROMBTM\", \"INR\"     Lab Results   Component Value Date/Time    WBC 6.7 02/29/2024 07:34 AM    RBC 4.95 02/29/2024 07:34 AM    HEMOGLOBIN 15.4 02/29/2024 07:34 AM    HEMATOCRIT 45.9 02/29/2024 07:34 AM    MCV 92.7 02/29/2024 07:34 AM    MCH 31.1 02/29/2024 07:34 AM    MCHC 33.6 02/29/2024 07:34 AM    MPV 11.1 02/29/2024 07:34 AM    NEUTSPOLYS 45.30 02/29/2024 07:34 AM    LYMPHOCYTES 40.50 02/29/2024 07:34 AM    MONOCYTES 9.50 02/29/2024 07:34 AM    EOSINOPHILS 4.20 02/29/2024 07:34 AM    BASOPHILS 0.40 02/29/2024 07:34 AM        Lab Results   Component Value Date/Time    HBA1C 6.8 (H) 12/20/2024 08:38 AM        EMG 3/24/2023  Impression:  This is a normal electrodiagnostic study.  There is no electrodiagnostic evidence of a generalized peripheral polyneuropathy, right median or ulnar mononeuropathy, sural mononeuropathy on either side, tibial mononeuropathy on either side, fibular mononeuropathy on either side, or lumbar plexopathy on either side. "        Imaging:   I personally reviewed following images  XR lumbar spine 6/7/23  Facet arthropathy of the bilateral lower lumbar levels.  Anterior body osteophyte at L4 and L5.  Disc space narrowing most notable at L3-4, L4-5, and L5-S1. See formal radiology report for further details.    XR hips 6/7/23  Moderate OA bilaterally. See formal radiology report for further details.     MRI lumbar spine 12/14/2024  At L1-2 there is mild right moderate left neuroforaminal stenosis.  At L2-3 there is mild to moderate central canal stenosis and bilateral neuroforaminal stenosis.  At L3-4 there is moderate central canal stenosis and moderate to severe bilateral neuroforaminal stenosis.  At L4-5 there is severe right and moderate left neuroforaminal stenosis and mild central canal stenosis.  At L5-S1 there is mild central canal stenosis and moderate to severe bilateral neuroforaminal stenosis.  Facet arthropathy at bilateral lower lumbar levels.  See formal radiology report for further details.    I reviewed the following radiology reports   MRI lumbar spine 12/14/2024  FINDINGS: The lowest formed intervertebral disc will be designated L5-S1 for the purposes of this report and vertebral levels numbered accordingly.        The lumbar vertebral bodies have unremarkable height and no gross malalignment.  No acute or suspicious osseous lesion is seen.  There is loss of intervertebral disc height throughout the lumbar spine, mild at L1-L2 and moderate at the other lumbar levels.  There are prominent Modic type I/II degenerative endplate changes adjacent to the L2-L3 intervertebral disc with Modic type II degenerative endplate changes at L3-L4, L4-L5 and L5-S1.  There are small Schmorl nodes in the inferior endplate of L2, both endplates of L3 and the superior endplate of L4.  The conus medullaris has a normal caliber course and signal intensity.     Level specific findings as follows:     L1-2: Mild diffuse disc bulge. Mild RIGHT  and moderate LEFT neural foraminal narrowing.  L2-3: Diffuse disc bulge. Mild to moderate BILATERAL facet arthropathy. Mild to moderate central canal narrowing and BILATERAL neural foraminal narrowing.  L3-4: Diffuse disc bulge. Moderate BILATERAL facet arthropathy. Mild to moderate central canal narrowing. Moderate to severe BILATERAL neural foraminal narrowing.  L4-5: Diffuse disc bulge. Severe RIGHT and moderate to severe LEFT facet arthropathy. Severe RIGHT and moderate LEFT neural foraminal narrowing. Mild central canal narrowing.  L5-S1: Diffuse disc bulge which approximates and probably contacts the traversing BILATERAL S1 nerve roots. Moderate BILATERAL facet arthropathy. Mild central canal narrowing. Moderate to severe BILATERAL neural foraminal narrowing.     There are changes of BILATERAL sacroiliac arthropathy.     Soft tissues: No abdominal aortic aneurysm or soft tissue mass is seen.     IMPRESSION:     1.  Multilevel multifactorial degenerative changes  2.  No areas of high-grade central canal narrowing  3.  Areas of central canal and neural foraminal narrowing as described above  4.  Disc disease at L5-S1 probably contacts the BILATERAL traversing S1 nerve roots.    X-ray hip 6/22/2017  AP pelvis and left lateral hip films were ordered performed and   interpreted by us today and reveal moderate heart osteoarthritic changes   in both hips with femoral head flattening noticed bilaterally with the   right mi reviewed left and sclerotic changes at the superior acetabular   dome bilaterally and early spur formation superolaterally consistent with   osteoarthritis.                                 XR lumbar spine 6/7/23  FINDINGS:  Vertebral body height is well maintained.  There is no evidence of fracture.  Vertebral alignment is normal.  There is moderate to severe degenerative disc disease and facet arthropathy mainly at the L3-S1 levels.        IMPRESSION:     1.  No compression deformity or acute  fracture is identified.     2.  There is significant degenerative disc disease and facet arthropathy throughout the lower lumbar spine.             XR hips 23  FINDINGS:  There is no evidence of acute fracture involving the pelvis. No proximal femoral fracture is identified.  There is moderate joint space narrowing. A core sclerosis and marginal spurring. There is mild flattening of the right femoral head. There is some linear lucency within the right femoral head.     IMPRESSION:     1.  No radiographic evidence of acute traumatic injury.     2.  Findings are consistent with moderate osteoarthritis bilaterally.     3.  There is some flattening of the right femoral head as well as some lucency in the mid femoral head area. Avascular necrosis is a possibility. Prior right femoral head fracture is also possible.           DIAGNOSIS   Visit Diagnoses     ICD-10-CM   1. Lumbar spondylosis  M47.816   2. Chronic bilateral low back pain without sciatica  M54.50    G89.29   3. Myalgia  M79.10   4. Gluteal pain  M79.18   5. Tendinopathy of right gluteus medius  M67.951   6. Acute bilateral low back pain without sciatica  M54.50   7. Chronic pain of both hips  M25.551    M25.552    G89.29   8. Arthritis of hip  M16.10               ASSESSMENT and PLAN:     Sukumar Tsyon  1955 male with ongoing low back pain    Exam today notable for tenderness to palpation at bilateral lower lumbar facet joints with exam notable for positive lumbar facet loading maneuver bilaterally reproducing the patient's pain, suggestive of lumbar facetogenic pain.  Imaging shows lumbar spondylosis at bilateral lower lumbar levels.    Does not appear to have hip joint mediated pain at this time, with negative FADIR and log roll and no hx of groin pain.    Darrius was seen today for follow-up.    Diagnoses and all orders for this visit:    Lumbar spondylosis  -     Referral to Pain Clinic    Chronic bilateral low back pain without  sciatica    Myalgia    Gluteal pain    Tendinopathy of right gluteus medius    Acute bilateral low back pain without sciatica    Chronic pain of both hips    Arthritis of hip              Physical Therapy: At this time, the patient has had persistent pain despite many months of formal physical therapy    Diagnostic workup: Personally reviewed at today's visit:    MRI lumbar spine 12/14/2024    Medications:   -Continue tizanidine and mobic PRN which he has taken with some relief.    Interventions:    -S/p trigger point injections most recently with limited relief  - discussed diagnostic lumbar medial branch blocks targeting Bilateral L4-L5 and L5-S1 facet joints. The risks, benefits, and alternatives to this procedure were discussed and the patient wishes to proceed with the procedure. Risks include but are not limited to damage to surrounding structures, infection, bleeding, worsening of pain which can be permanent, and weakness which can be permanent. Benefits include pain relief and improved function. Alternatives include not doing the procedure.    -Discussed possible epidural if his sciatica pain returns and is severe enough to warrant an injection    Other   - Discussed possible imaging of left knee if his pain persists.  Currently his left knee pain is improving and not significantly limiting him    Follow up: 3-7 days after lumbar medial branch blocks    Haley Herron MD  Interventional Pain and Spine  Physical Medicine and Rehabilitation  [facility]

## 2025-01-08 NOTE — H&P (VIEW-ONLY)
Verbal consent was acquired by the patient to use [employer] ambient listening note generation during this visit Yes     Follow up patient note  Interventional spine and Pain  Physiatry (physical medicine and  Rehabilitation)       Chief complaint:   Chief Complaint   Patient presents with    Follow-Up     MRI Review          HISTORY (2/17/2023):  Sukumar Tyson is a 67 y.o. male who presents today with a dull constant discomfort in his upper thighs for a couple of months and left big toe for several years. This doesn't limit him from doing activities. He is still able to ski without difficulty or exacerbation of pain. He denies noticeable numbness/tingling or weakness in his legs. He notes a history of right sciatica but denies current symptoms of this.  He was referred for an EMG by his PCP. He remembers getting an EMG at least 20 years ago in [place] which was normal. Notes that his A1c has typically been in the 6% range but his most recent A1c was 7.4%. This is the highest it's been.      Pain right now is 2/10 on the numeric pain scale. His pain at its best-worse level during the course of the day is typically 2-3/10, respectively.  Pain worsens with sitting and side bending or twisting and improves with standing, walking, bending forward, and bending backwards. His pain does not interfere with ADLs. The patient denies weakness, numbness, or bladder/bowel incontinence.     Notes that he has been limited by low back pain and hip pain in the past.  An x-ray in 2017 showed signs of very mild hip OA.  He remembers seeing a doctor who mentioned that his pain could be related to OA.  He has not done dedicated physical therapy for this in the past.     The patient has not done physical therapy for this problem. Has done PT for his back and still does home exercise program      Patient has tried the following medications with varied success (current meds in bold):   Oral naproxen PRN prior to physical activity  primarily for back and hip pain     Medical history includes T1DM, HLD, HTN.    HPI  Patient identification: Sukumar Tyson ,  1955,   With Diagnoses of Lumbar spondylosis, Chronic bilateral low back pain without sciatica, Myalgia, Gluteal pain, Tendinopathy of right gluteus medius, Acute bilateral low back pain without sciatica, Chronic pain of both hips, and Arthritis of hip were pertinent to this visit.     History of Present Illness  The patient presents for evaluation of back pain and knee pain.    He reports persistent back pain, which remains unchanged since his last visit. He has been engaging in stretching exercises as part of his physical therapy regimen, but he is not going anymore because they discharged him. He has attended over 10 sessions. He does not experience any radiating leg pain associated with his back condition. He also reports no current symptoms of sciatica, a condition he experienced several years ago. The pain intensifies when transitioning from a seated to a standing position, making it challenging to initiate movement.    He recently sustained a left knee injury while skiing, initially causing wrist pain upon standing. The following day, he noticed a cracking sound in his knee during flexion, which subsequently resolved and was replaced by mild pain. Despite this, he continued to ski. He reports no swelling in the knee.    Procedure history:  -5/15/2023 trigger point injections with Dr. Watters  -significant relief.   -24 trigger point injections -1 day of relief     ROS Red Flags :   Fever, Chills, Sweats: Denies  Involuntary Weight Loss: Denies  Bowel/Bladder Incontinence: Denies  Saddle Anesthesia: Denies        PMHx:   Past Medical History:   Diagnosis Date    Arrhythmia 10/04/2024    History of SVT 20 years ago.    Diabetes (HCC)     GERD (gastroesophageal reflux disease)     Glaucoma     High cholesterol     HTN (hypertension)     Neuropathy        PSHx:    Past Surgical History:   Procedure Laterality Date    KY LARYNGOSCOPY,DIRCT,OP SCOP,EXC TUMR Right 10/10/2024    Procedure: RIGHT MICRO LARYNGOSCOPY WITH TRUE VOCAL CORD STRIPPING, LASER;  Surgeon: Roni Vidales M.D.;  Location: SURGERY SAME DAY [facility];  Service: Ent    OTHER CARDIAC SURGERY  10/04/2024    Ablation 20 years ago for SVT    TONSILLECTOMY      as a child       Family history   Family History   Problem Relation Age of Onset    No Known Problems Mother     No Known Problems Father          Medications:   Outpatient Medications Marked as Taking for the 1/8/25 encounter (Office Visit) with Haley Herron M.D.   Medication Sig Dispense Refill    atorvastatin (LIPITOR) 10 MG Tab Take 1 Tablet by mouth every day. 90 Tablet 3    loratadine (CLARITIN) 10 MG Tab Take 10 mg by mouth every day.        **MEDICATION INSTRUCTIONS FOR SURGERY/PROCEDURE SCHEDULED FOR 10/10/2024   OK TO CONTINUE TAKING PRIOR TO SURGERY AND DAY OF SURGERY      COLLAGEN PO Take  by mouth every day.          **MEDICATION INSTRUCTIONS FOR SURGERY/PROCEDURE SCHEDULED FOR 10/10/2024   DO NOT TAKE 7 DAYS PRIOR TO SURGERY      omeprazole (PRILOSEC) 40 MG delayed-release capsule Take 1 Capsule by mouth every day. (Patient taking differently: Take 40 mg by mouth every day.        **MEDICATION INSTRUCTIONS FOR SURGERY/PROCEDURE SCHEDULED FOR 10/10/2024.   OK TO CONTINUE TAKING PRIOR TO SURGERY AND DAY OF SURGERY) 90 Capsule 3    latanoprost (XALATAN) 0.005 % Solution        **MEDICATION INSTRUCTIONS FOR SURGERY/PROCEDURE SCHEDULED FOR 10/10/2024   OK TO CONTINUE TAKING PRIOR TO SURGERY AND NIGHT BEFORE SURGERY      Continuous Blood Gluc Sensor (FREESTYLE DON 14 DAY SENSOR) Misc 1 Each every 14 days. 6 Each 3    amLODIPine (NORVASC) 5 MG Tab Take 1 Tablet by mouth every day. (Patient taking differently: Take 5 mg by mouth every day.          **MEDICATION INSTRUCTIONS FOR SURGERY/PROCEDURE SCHEDULED FOR 10/10/2024.   OK TO CONTINUE TAKING  PRIOR TO SURGERY AND DAY OF SURGERY) 90 Tablet 3    Insulin Degludec (TRESIBA FLEXTOUCH) 100 UNIT/ML Solution Pen-injector INJECT 50 SUBCUTANEOUSLY ONCE DAILY (Patient taking differently: 10 Units. INJECT 50 SUBCUTANEOUSLY ONCE DAILY            **MEDICATION INSTRUCTIONS FOR SURGERY/PROCEDURE SCHEDULED FOR 10/10/2024   COORDINATE MEDICATION INSTRUCTIONS FROM PRESCRIBING PHYSICIAN) 15 mL 11    Glucagon (GVOKE HYPOPEN 1-PACK) 1 MG/0.2ML Solution Auto-injector Inject 1 mg under the skin one time as needed (hypoglycemia) for up to 1 dose. (Patient taking differently: Inject 1 mg under the skin one time as needed (hypoglycemia).          **MEDICATION INSTRUCTIONS FOR SURGERY/PROCEDURE SCHEDULED FOR 10/10/2024   OK TO CONTINUE TAKING AS PRESCRIBED) 0.2 mL 1    tadalafil (CIALIS) 20 MG tablet Take 1 Tablet by mouth as needed for Erectile Dysfunction. (Patient taking differently: Take 20 mg by mouth as needed for Erectile Dysfunction.          **MEDICATION INSTRUCTIONS FOR SURGERY/PROCEDURE SCHEDULED FOR 10/10/2024   DO NOT TAKE FOR 48 HOURS PRIOR TO SURGERY) 30 Tablet 3    Insulin Pen Needle (PEN NEEDLES) 32G X 5 MM Misc Use to inject insulin 4x daily subq 100 Each 3    insulin lispro (HUMALOG,ADMELOG) 100 UNIT/ML Solution Pen-injector injection PEN Inject 8 Units under the skin 3 times a day before meals.       **MEDICATION INSTRUCTIONS FOR SURGERY/PROCEDURE SCHEDULED FOR 10/10/2024   COORDINATE MEDICATION INSTRUCTIONS FROM PRESCRIBING PHYSICIAN      Continuous Blood Gluc  (FREESTYLE DON 2 READER) Device Follow box instructions 1 Each 3    Multiple Vitamin (DAILY-JEANNA) Tab Take 1 Tablet by mouth every day.          **MEDICATION INSTRUCTIONS FOR SURGERY/PROCEDURE SCHEDULED FOR 10/10/2024   DO NOT TAKE 7 DAYS PRIOR TO SURGERY      glucosamine Sulfate 500 MG Cap Take 500 mg by mouth 3 times a day with meals. Once a day 1500 mg       **MEDICATION INSTRUCTIONS FOR SURGERY/PROCEDURE SCHEDULED FOR 10/10/2024   DO NOT  TAKE 7 DAYS PRIOR TO SURGERY      aspirin (ASA) 81 MG Chew Tab chewable tablet Chew 81 mg every day.          **MEDICATION INSTRUCTIONS FOR SURGERY/PROCEDURE SCHEDULED FOR 10/10/2024   COORDINATE MEDICATION INSTRUCTIONS FROM PRESCRIBING PHYSICIAN.  PATIENT STATES LAST TAKEN ON 10/2/2024          Current Outpatient Medications on File Prior to Visit   Medication Sig Dispense Refill    atorvastatin (LIPITOR) 10 MG Tab Take 1 Tablet by mouth every day. 90 Tablet 3    loratadine (CLARITIN) 10 MG Tab Take 10 mg by mouth every day.        **MEDICATION INSTRUCTIONS FOR SURGERY/PROCEDURE SCHEDULED FOR 10/10/2024   OK TO CONTINUE TAKING PRIOR TO SURGERY AND DAY OF SURGERY      COLLAGEN PO Take  by mouth every day.          **MEDICATION INSTRUCTIONS FOR SURGERY/PROCEDURE SCHEDULED FOR 10/10/2024   DO NOT TAKE 7 DAYS PRIOR TO SURGERY      omeprazole (PRILOSEC) 40 MG delayed-release capsule Take 1 Capsule by mouth every day. (Patient taking differently: Take 40 mg by mouth every day.        **MEDICATION INSTRUCTIONS FOR SURGERY/PROCEDURE SCHEDULED FOR 10/10/2024.   OK TO CONTINUE TAKING PRIOR TO SURGERY AND DAY OF SURGERY) 90 Capsule 3    latanoprost (XALATAN) 0.005 % Solution        **MEDICATION INSTRUCTIONS FOR SURGERY/PROCEDURE SCHEDULED FOR 10/10/2024   OK TO CONTINUE TAKING PRIOR TO SURGERY AND NIGHT BEFORE SURGERY      Continuous Blood Gluc Sensor (FREESTYLE DON 14 DAY SENSOR) Misc 1 Each every 14 days. 6 Each 3    amLODIPine (NORVASC) 5 MG Tab Take 1 Tablet by mouth every day. (Patient taking differently: Take 5 mg by mouth every day.          **MEDICATION INSTRUCTIONS FOR SURGERY/PROCEDURE SCHEDULED FOR 10/10/2024.   OK TO CONTINUE TAKING PRIOR TO SURGERY AND DAY OF SURGERY) 90 Tablet 3    Insulin Degludec (TRESIBA FLEXTOUCH) 100 UNIT/ML Solution Pen-injector INJECT 50 SUBCUTANEOUSLY ONCE DAILY (Patient taking differently: 10 Units. INJECT 50 SUBCUTANEOUSLY ONCE DAILY            **MEDICATION INSTRUCTIONS FOR  SURGERY/PROCEDURE SCHEDULED FOR 10/10/2024   COORDINATE MEDICATION INSTRUCTIONS FROM PRESCRIBING PHYSICIAN) 15 mL 11    Glucagon (GVOKE HYPOPEN 1-PACK) 1 MG/0.2ML Solution Auto-injector Inject 1 mg under the skin one time as needed (hypoglycemia) for up to 1 dose. (Patient taking differently: Inject 1 mg under the skin one time as needed (hypoglycemia).          **MEDICATION INSTRUCTIONS FOR SURGERY/PROCEDURE SCHEDULED FOR 10/10/2024   OK TO CONTINUE TAKING AS PRESCRIBED) 0.2 mL 1    tadalafil (CIALIS) 20 MG tablet Take 1 Tablet by mouth as needed for Erectile Dysfunction. (Patient taking differently: Take 20 mg by mouth as needed for Erectile Dysfunction.          **MEDICATION INSTRUCTIONS FOR SURGERY/PROCEDURE SCHEDULED FOR 10/10/2024   DO NOT TAKE FOR 48 HOURS PRIOR TO SURGERY) 30 Tablet 3    Insulin Pen Needle (PEN NEEDLES) 32G X 5 MM Misc Use to inject insulin 4x daily subq 100 Each 3    insulin lispro (HUMALOG,ADMELOG) 100 UNIT/ML Solution Pen-injector injection PEN Inject 8 Units under the skin 3 times a day before meals.       **MEDICATION INSTRUCTIONS FOR SURGERY/PROCEDURE SCHEDULED FOR 10/10/2024   COORDINATE MEDICATION INSTRUCTIONS FROM PRESCRIBING PHYSICIAN      Continuous Blood Gluc  (FREESTYLE DON 2 READER) Device Follow box instructions 1 Each 3    Multiple Vitamin (DAILY-JEANNA) Tab Take 1 Tablet by mouth every day.          **MEDICATION INSTRUCTIONS FOR SURGERY/PROCEDURE SCHEDULED FOR 10/10/2024   DO NOT TAKE 7 DAYS PRIOR TO SURGERY      glucosamine Sulfate 500 MG Cap Take 500 mg by mouth 3 times a day with meals. Once a day 1500 mg       **MEDICATION INSTRUCTIONS FOR SURGERY/PROCEDURE SCHEDULED FOR 10/10/2024   DO NOT TAKE 7 DAYS PRIOR TO SURGERY      aspirin (ASA) 81 MG Chew Tab chewable tablet Chew 81 mg every day.          **MEDICATION INSTRUCTIONS FOR SURGERY/PROCEDURE SCHEDULED FOR 10/10/2024   COORDINATE MEDICATION INSTRUCTIONS FROM PRESCRIBING PHYSICIAN.  PATIENT STATES LAST TAKEN ON  10/2/2024      tizanidine (ZANAFLEX) 4 MG Tab Take 0.5-1 Tablets by mouth at bedtime as needed (muscle spasms). (Patient not taking: Reported on 10/4/2024) 30 Tablet 2     No current facility-administered medications on file prior to visit.         Allergies:   No Known Allergies    Social Hx:   Social History     Socioeconomic History    Marital status:      Spouse name: Not on file    Number of children: Not on file    Years of education: Not on file    Highest education level: Bachelor's degree (e.g., BA, AB, BS)   Occupational History    Not on file   Tobacco Use    Smoking status: Never    Smokeless tobacco: Never   Vaping Use    Vaping status: Never Used   Substance and Sexual Activity    Alcohol use: Yes     Alcohol/week: 1.2 oz     Types: 2 Cans of beer per week     Comment: Occ. beer or wine    Drug use: Never    Sexual activity: Yes     Partners: Female   Other Topics Concern    Not on file   Social History Narrative    Not on file     Social Drivers of Health     Financial Resource Strain: Low Risk  (12/9/2023)    Overall Financial Resource Strain (CARDIA)     Difficulty of Paying Living Expenses: Not hard at all   Food Insecurity: No Food Insecurity (12/9/2023)    Hunger Vital Sign     Worried About Running Out of Food in the Last Year: Never true     Ran Out of Food in the Last Year: Never true   Transportation Needs: No Transportation Needs (12/9/2023)    PRAPARE - Transportation     Lack of Transportation (Medical): No     Lack of Transportation (Non-Medical): No   Physical Activity: Sufficiently Active (12/9/2023)    Exercise Vital Sign     Days of Exercise per Week: 7 days     Minutes of Exercise per Session: 50 min   Stress: No Stress Concern Present (12/9/2023)    [employer] - Occupational Stress Questionnaire     Feeling of Stress : Not at all   Social Connections: Socially Integrated (12/9/2023)    Social Connection and Isolation Panel [NHANES]     Frequency  of Communication with Friends and Family: More than three times a week     Frequency of Social Gatherings with Friends and Family: More than three times a week     Attends Yazidi Services: 1 to 4 times per year     Active Member of Clubs or Organizations: Yes     Attends Club or Organization Meetings: More than 4 times per year     Marital Status:    Intimate Partner Violence: Not on file   Housing Stability: Low Risk  (12/9/2023)    Housing Stability Vital Sign     Unable to Pay for Housing in the Last Year: No     Number of Places Lived in the Last Year: 1     Unstable Housing in the Last Year: No         EXAMINATION     Physical Exam:   Vitals: BP (!) 162/80 (BP Location: Right arm, Patient Position: Sitting, BP Cuff Size: Adult)   Pulse (!) 54   Temp 36.6 °C (97.9 °F) (Temporal)   Ht 1.829 m (6')   Wt 77.8 kg (171 lb 8.3 oz)   SpO2 98%     Constitutional:   Body Habitus: Body mass index is 23.26 kg/m².  Cooperation: Fully cooperates with exam  Appearance: Well-groomed, well-nourished.     Eyes: No scleral icterus to suggest severe liver disease, no proptosis to suggest severe hyperthyroidism     ENT -no obvious auditory deficits, no noticeable facial droop      Skin -no rashes or lesions noted      Respiratory-  breathing comfortably on room air, no audible wheezing     Cardiovascular-distal extremities warm and well perfused.  No lower extremity edema is noted.      Gastrointestinal - no obvious abdominal masses, non-distended     Psychiatric- alert and oriented ×3. Normal affect.      Gait - normal gait, no use of ambulatory device, nonantalgic.      Musculoskeletal and Neuro -      Thoracic/Lumbar Spine/Sacral Spine/Hips   Inspection: No evidence of atrophy in bilateral lower extremities throughout      Palpation:   Tenderness to palpation over the bilateral lumbar facets      Facet loading maneuver positive bilaterally    Lumbar spine /hip provocative exam maneuvers  Straight leg raise  "negative bilaterally  FADIR test negative bilaterally     SI joint tests  GRACIE test negative bilaterally        Key points for the international standards for neurological classification of spinal cord injury (ISNCSCI) to light touch.   Dermatome R L   L2 2 2   L3 2 2   L4 2 2   L5 2 2   S1 2 2   S2 2 2         Motor Exam Lower Extremities  ? Myotome R L   Hip flexion L2 5 5   Knee extension L3 5 5   Ankle dorsiflexion L4 5 5   Toe extension L5 5 5   Ankle plantarflexion S1 5 5      Previous exam  Reflexes  ?   R L   Patella   1+ 1+   Achilles    1+ 1+      Clonus of the ankle negative bilaterally                MEDICAL DECISION MAKING    DATA    Labs:   Lab Results   Component Value Date/Time    SODIUM 142 10/04/2024 11:21 AM    POTASSIUM 4.2 10/04/2024 11:21 AM    CHLORIDE 107 10/04/2024 11:21 AM    CO2 26 10/04/2024 11:21 AM    GLUCOSE 114 (H) 10/04/2024 11:21 AM    BUN 23 (H) 10/04/2024 11:21 AM    CREATININE 0.92 10/04/2024 11:21 AM        No results found for: \"PROTHROMBTM\", \"INR\"     Lab Results   Component Value Date/Time    WBC 6.7 02/29/2024 07:34 AM    RBC 4.95 02/29/2024 07:34 AM    HEMOGLOBIN 15.4 02/29/2024 07:34 AM    HEMATOCRIT 45.9 02/29/2024 07:34 AM    MCV 92.7 02/29/2024 07:34 AM    MCH 31.1 02/29/2024 07:34 AM    MCHC 33.6 02/29/2024 07:34 AM    MPV 11.1 02/29/2024 07:34 AM    NEUTSPOLYS 45.30 02/29/2024 07:34 AM    LYMPHOCYTES 40.50 02/29/2024 07:34 AM    MONOCYTES 9.50 02/29/2024 07:34 AM    EOSINOPHILS 4.20 02/29/2024 07:34 AM    BASOPHILS 0.40 02/29/2024 07:34 AM        Lab Results   Component Value Date/Time    HBA1C 6.8 (H) 12/20/2024 08:38 AM        EMG 3/24/2023  Impression:  This is a normal electrodiagnostic study.  There is no electrodiagnostic evidence of a generalized peripheral polyneuropathy, right median or ulnar mononeuropathy, sural mononeuropathy on either side, tibial mononeuropathy on either side, fibular mononeuropathy on either side, or lumbar plexopathy on either side. "        Imaging:   I personally reviewed following images  XR lumbar spine 6/7/23  Facet arthropathy of the bilateral lower lumbar levels.  Anterior body osteophyte at L4 and L5.  Disc space narrowing most notable at L3-4, L4-5, and L5-S1. See formal radiology report for further details.    XR hips 6/7/23  Moderate OA bilaterally. See formal radiology report for further details.     MRI lumbar spine 12/14/2024  At L1-2 there is mild right moderate left neuroforaminal stenosis.  At L2-3 there is mild to moderate central canal stenosis and bilateral neuroforaminal stenosis.  At L3-4 there is moderate central canal stenosis and moderate to severe bilateral neuroforaminal stenosis.  At L4-5 there is severe right and moderate left neuroforaminal stenosis and mild central canal stenosis.  At L5-S1 there is mild central canal stenosis and moderate to severe bilateral neuroforaminal stenosis.  Facet arthropathy at bilateral lower lumbar levels.  See formal radiology report for further details.    I reviewed the following radiology reports   MRI lumbar spine 12/14/2024  FINDINGS: The lowest formed intervertebral disc will be designated L5-S1 for the purposes of this report and vertebral levels numbered accordingly.        The lumbar vertebral bodies have unremarkable height and no gross malalignment.  No acute or suspicious osseous lesion is seen.  There is loss of intervertebral disc height throughout the lumbar spine, mild at L1-L2 and moderate at the other lumbar levels.  There are prominent Modic type I/II degenerative endplate changes adjacent to the L2-L3 intervertebral disc with Modic type II degenerative endplate changes at L3-L4, L4-L5 and L5-S1.  There are small Schmorl nodes in the inferior endplate of L2, both endplates of L3 and the superior endplate of L4.  The conus medullaris has a normal caliber course and signal intensity.     Level specific findings as follows:     L1-2: Mild diffuse disc bulge. Mild RIGHT  and moderate LEFT neural foraminal narrowing.  L2-3: Diffuse disc bulge. Mild to moderate BILATERAL facet arthropathy. Mild to moderate central canal narrowing and BILATERAL neural foraminal narrowing.  L3-4: Diffuse disc bulge. Moderate BILATERAL facet arthropathy. Mild to moderate central canal narrowing. Moderate to severe BILATERAL neural foraminal narrowing.  L4-5: Diffuse disc bulge. Severe RIGHT and moderate to severe LEFT facet arthropathy. Severe RIGHT and moderate LEFT neural foraminal narrowing. Mild central canal narrowing.  L5-S1: Diffuse disc bulge which approximates and probably contacts the traversing BILATERAL S1 nerve roots. Moderate BILATERAL facet arthropathy. Mild central canal narrowing. Moderate to severe BILATERAL neural foraminal narrowing.     There are changes of BILATERAL sacroiliac arthropathy.     Soft tissues: No abdominal aortic aneurysm or soft tissue mass is seen.     IMPRESSION:     1.  Multilevel multifactorial degenerative changes  2.  No areas of high-grade central canal narrowing  3.  Areas of central canal and neural foraminal narrowing as described above  4.  Disc disease at L5-S1 probably contacts the BILATERAL traversing S1 nerve roots.    X-ray hip 6/22/2017  AP pelvis and left lateral hip films were ordered performed and   interpreted by us today and reveal moderate heart osteoarthritic changes   in both hips with femoral head flattening noticed bilaterally with the   right mi reviewed left and sclerotic changes at the superior acetabular   dome bilaterally and early spur formation superolaterally consistent with   osteoarthritis.                                 XR lumbar spine 6/7/23  FINDINGS:  Vertebral body height is well maintained.  There is no evidence of fracture.  Vertebral alignment is normal.  There is moderate to severe degenerative disc disease and facet arthropathy mainly at the L3-S1 levels.        IMPRESSION:     1.  No compression deformity or acute  fracture is identified.     2.  There is significant degenerative disc disease and facet arthropathy throughout the lower lumbar spine.             XR hips 23  FINDINGS:  There is no evidence of acute fracture involving the pelvis. No proximal femoral fracture is identified.  There is moderate joint space narrowing. A core sclerosis and marginal spurring. There is mild flattening of the right femoral head. There is some linear lucency within the right femoral head.     IMPRESSION:     1.  No radiographic evidence of acute traumatic injury.     2.  Findings are consistent with moderate osteoarthritis bilaterally.     3.  There is some flattening of the right femoral head as well as some lucency in the mid femoral head area. Avascular necrosis is a possibility. Prior right femoral head fracture is also possible.           DIAGNOSIS   Visit Diagnoses     ICD-10-CM   1. Lumbar spondylosis  M47.816   2. Chronic bilateral low back pain without sciatica  M54.50    G89.29   3. Myalgia  M79.10   4. Gluteal pain  M79.18   5. Tendinopathy of right gluteus medius  M67.951   6. Acute bilateral low back pain without sciatica  M54.50   7. Chronic pain of both hips  M25.551    M25.552    G89.29   8. Arthritis of hip  M16.10               ASSESSMENT and PLAN:     Sukumar Tyson  1955 male with ongoing low back pain    Exam today notable for tenderness to palpation at bilateral lower lumbar facet joints with exam notable for positive lumbar facet loading maneuver bilaterally reproducing the patient's pain, suggestive of lumbar facetogenic pain.  Imaging shows lumbar spondylosis at bilateral lower lumbar levels.    Does not appear to have hip joint mediated pain at this time, with negative FADIR and log roll and no hx of groin pain.    Darrius was seen today for follow-up.    Diagnoses and all orders for this visit:    Lumbar spondylosis  -     Referral to Pain Clinic    Chronic bilateral low back pain without  sciatica    Myalgia    Gluteal pain    Tendinopathy of right gluteus medius    Acute bilateral low back pain without sciatica    Chronic pain of both hips    Arthritis of hip              Physical Therapy: At this time, the patient has had persistent pain despite many months of formal physical therapy    Diagnostic workup: Personally reviewed at today's visit:    MRI lumbar spine 12/14/2024    Medications:   -Continue tizanidine and mobic PRN which he has taken with some relief.    Interventions:    -S/p trigger point injections most recently with limited relief  - discussed diagnostic lumbar medial branch blocks targeting Bilateral L4-L5 and L5-S1 facet joints. The risks, benefits, and alternatives to this procedure were discussed and the patient wishes to proceed with the procedure. Risks include but are not limited to damage to surrounding structures, infection, bleeding, worsening of pain which can be permanent, and weakness which can be permanent. Benefits include pain relief and improved function. Alternatives include not doing the procedure.    -Discussed possible epidural if his sciatica pain returns and is severe enough to warrant an injection    Other   - Discussed possible imaging of left knee if his pain persists.  Currently his left knee pain is improving and not significantly limiting him    Follow up: 3-7 days after lumbar medial branch blocks    Haley Herron MD  Interventional Pain and Spine  Physical Medicine and Rehabilitation  [facility]

## 2025-01-20 ENCOUNTER — PATIENT MESSAGE (OUTPATIENT)
Dept: MEDICAL GROUP | Facility: LAB | Age: 70
End: 2025-01-20

## 2025-01-20 ENCOUNTER — OFFICE VISIT (OUTPATIENT)
Dept: MEDICAL GROUP | Facility: LAB | Age: 70
End: 2025-01-20
Payer: MEDICARE

## 2025-01-20 VITALS
DIASTOLIC BLOOD PRESSURE: 62 MMHG | RESPIRATION RATE: 16 BRPM | HEIGHT: 72 IN | TEMPERATURE: 97.3 F | SYSTOLIC BLOOD PRESSURE: 148 MMHG | OXYGEN SATURATION: 96 % | HEART RATE: 58 BPM | BODY MASS INDEX: 23.11 KG/M2 | WEIGHT: 170.64 LBS

## 2025-01-20 DIAGNOSIS — E55.9 VITAMIN D DEFICIENCY: ICD-10-CM

## 2025-01-20 DIAGNOSIS — I10 ESSENTIAL HYPERTENSION: ICD-10-CM

## 2025-01-20 DIAGNOSIS — E10.9 TYPE 1 DIABETES MELLITUS WITHOUT COMPLICATION (HCC): ICD-10-CM

## 2025-01-20 PROCEDURE — 3077F SYST BP >= 140 MM HG: CPT | Performed by: INTERNAL MEDICINE

## 2025-01-20 PROCEDURE — 99214 OFFICE O/P EST MOD 30 MIN: CPT | Performed by: INTERNAL MEDICINE

## 2025-01-20 PROCEDURE — 3078F DIAST BP <80 MM HG: CPT | Performed by: INTERNAL MEDICINE

## 2025-01-20 RX ORDER — INSULIN LISPRO 100 [IU]/ML
10 INJECTION, SOLUTION INTRAVENOUS; SUBCUTANEOUS
Qty: 10 EACH | Refills: 3 | Status: SHIPPED | OUTPATIENT
Start: 2025-01-20

## 2025-01-20 RX ORDER — NAPROXEN 250 MG/1
250 TABLET ORAL 2 TIMES DAILY WITH MEALS
COMMUNITY

## 2025-01-20 RX ORDER — INSULIN LISPRO 100 [IU]/ML
10 INJECTION, SOLUTION INTRAVENOUS; SUBCUTANEOUS
Qty: 10 EACH | Refills: 3 | Status: SHIPPED | OUTPATIENT
Start: 2025-01-20 | End: 2025-01-20

## 2025-01-20 RX ORDER — OLMESARTAN MEDOXOMIL 20 MG/1
20 TABLET ORAL DAILY
Qty: 100 TABLET | Refills: 3 | Status: SHIPPED | OUTPATIENT
Start: 2025-01-20 | End: 2026-02-24

## 2025-01-20 RX ORDER — IBUPROFEN 200 MG
200 TABLET ORAL EVERY 6 HOURS PRN
COMMUNITY

## 2025-01-20 ASSESSMENT — FIBROSIS 4 INDEX: FIB4 SCORE: 1.47

## 2025-01-20 NOTE — PROGRESS NOTES
CC: Sukumar Tyson is a 69 y.o. male is suffering from   Chief Complaint   Patient presents with    Follow-Up     GI and ENT referral follow up          SUBJECTIVE:  1. Essential hypertension  Darrius is here for follow-up, is doing reasonably well has a history of type 1 diabetes    2. Vitamin D deficiency  Recheck vitamin D    3. Type 1 diabetes mellitus without complication (HCC)  Recheck hemoglobin A1c        Past social, family, history:   Social History     Tobacco Use    Smoking status: Never    Smokeless tobacco: Never   Vaping Use    Vaping status: Never Used   Substance Use Topics    Alcohol use: Yes     Alcohol/week: 1.2 oz     Types: 2 Cans of beer per week     Comment: Occ. beer or wine    Drug use: Never         MEDICATIONS:    Current Outpatient Medications:     olmesartan (BENICAR) 20 MG Tab, Take 1 Tablet by mouth every day., Disp: 100 Tablet, Rfl: 3    insulin lispro 100 UNIT/ML SC SOPN injection PEN, Inject 10 Units under the skin 3 times a day before meals. **MEDICATION INSTRUCTIONS FOR SURGERY/PROCEDURE SCHEDULED FOR 10/10/2024 COORDINATE MEDICATION INSTRUCTIONS FROM PRESCRIBING PHYSICIAN, Disp: 10 Each, Rfl: 3    naproxen (NAPROSYN) 250 MG Tab, Take 250 mg by mouth 2 times a day with meals., Disp: , Rfl:     ibuprofen (MOTRIN) 200 MG Tab, Take 200 mg by mouth every 6 hours as needed., Disp: , Rfl:     atorvastatin (LIPITOR) 10 MG Tab, Take 1 Tablet by mouth every day., Disp: 90 Tablet, Rfl: 3    loratadine (CLARITIN) 10 MG Tab, Take 10 mg by mouth every day.     **MEDICATION INSTRUCTIONS FOR SURGERY/PROCEDURE SCHEDULED FOR 10/10/2024  OK TO CONTINUE TAKING PRIOR TO SURGERY AND DAY OF SURGERY, Disp: , Rfl:     COLLAGEN PO, Take  by mouth every day.       **MEDICATION INSTRUCTIONS FOR SURGERY/PROCEDURE SCHEDULED FOR 10/10/2024  DO NOT TAKE 7 DAYS PRIOR TO SURGERY, Disp: , Rfl:     omeprazole (PRILOSEC) 40 MG delayed-release capsule, Take 1 Capsule by mouth every day. (Patient taking  differently: Take 40 mg by mouth every day.     **MEDICATION INSTRUCTIONS FOR SURGERY/PROCEDURE SCHEDULED FOR 10/10/2024.  OK TO CONTINUE TAKING PRIOR TO SURGERY AND DAY OF SURGERY), Disp: 90 Capsule, Rfl: 3    latanoprost (XALATAN) 0.005 % Solution,      **MEDICATION INSTRUCTIONS FOR SURGERY/PROCEDURE SCHEDULED FOR 10/10/2024  OK TO CONTINUE TAKING PRIOR TO SURGERY AND NIGHT BEFORE SURGERY, Disp: , Rfl:     tizanidine (ZANAFLEX) 4 MG Tab, Take 0.5-1 Tablets by mouth at bedtime as needed (muscle spasms). (Patient not taking: Reported on 10/4/2024), Disp: 30 Tablet, Rfl: 2    Continuous Blood Gluc Sensor (FREESTYLE DON 14 DAY SENSOR) Misc, 1 Each every 14 days., Disp: 6 Each, Rfl: 3    amLODIPine (NORVASC) 5 MG Tab, Take 1 Tablet by mouth every day. (Patient taking differently: Take 5 mg by mouth every day.       **MEDICATION INSTRUCTIONS FOR SURGERY/PROCEDURE SCHEDULED FOR 10/10/2024.  OK TO CONTINUE TAKING PRIOR TO SURGERY AND DAY OF SURGERY), Disp: 90 Tablet, Rfl: 3    Insulin Degludec (TRESIBA FLEXTOUCH) 100 UNIT/ML Solution Pen-injector, INJECT 50 SUBCUTANEOUSLY ONCE DAILY (Patient taking differently: 10 Units. INJECT 50 SUBCUTANEOUSLY ONCE DAILY         **MEDICATION INSTRUCTIONS FOR SURGERY/PROCEDURE SCHEDULED FOR 10/10/2024  COORDINATE MEDICATION INSTRUCTIONS FROM PRESCRIBING PHYSICIAN), Disp: 15 mL, Rfl: 11    Glucagon (GVOKE HYPOPEN 1-PACK) 1 MG/0.2ML Solution Auto-injector, Inject 1 mg under the skin one time as needed (hypoglycemia) for up to 1 dose. (Patient taking differently: Inject 1 mg under the skin one time as needed (hypoglycemia).       **MEDICATION INSTRUCTIONS FOR SURGERY/PROCEDURE SCHEDULED FOR 10/10/2024  OK TO CONTINUE TAKING AS PRESCRIBED), Disp: 0.2 mL, Rfl: 1    tadalafil (CIALIS) 20 MG tablet, Take 1 Tablet by mouth as needed for Erectile Dysfunction. (Patient taking differently: Take 20 mg by mouth as needed for Erectile Dysfunction.       **MEDICATION INSTRUCTIONS FOR  SURGERY/PROCEDURE SCHEDULED FOR 10/10/2024  DO NOT TAKE FOR 48 HOURS PRIOR TO SURGERY), Disp: 30 Tablet, Rfl: 3    Insulin Pen Needle (PEN NEEDLES) 32G X 5 MM Misc, Use to inject insulin 4x daily subq, Disp: 100 Each, Rfl: 3    Continuous Blood Gluc  (FREESTYLE DON 2 READER) Device, Follow box instructions, Disp: 1 Each, Rfl: 3    Multiple Vitamin (DAILY-JEANNA) Tab, Take 1 Tablet by mouth every day.       **MEDICATION INSTRUCTIONS FOR SURGERY/PROCEDURE SCHEDULED FOR 10/10/2024  DO NOT TAKE 7 DAYS PRIOR TO SURGERY, Disp: , Rfl:     glucosamine Sulfate 500 MG Cap, Take 500 mg by mouth 3 times a day with meals. Once a day 1500 mg    **MEDICATION INSTRUCTIONS FOR SURGERY/PROCEDURE SCHEDULED FOR 10/10/2024  DO NOT TAKE 7 DAYS PRIOR TO SURGERY, Disp: , Rfl:     aspirin (ASA) 81 MG Chew Tab chewable tablet, Chew 81 mg every day.       **MEDICATION INSTRUCTIONS FOR SURGERY/PROCEDURE SCHEDULED FOR 10/10/2024  COORDINATE MEDICATION INSTRUCTIONS FROM PRESCRIBING PHYSICIAN. PATIENT STATES LAST TAKEN ON 10/2/2024, Disp: , Rfl:     PROBLEMS:  Patient Active Problem List    Diagnosis Date Noted    Type 1 diabetes mellitus with diabetic neuropathy, unspecified (HCC) 07/24/2023    Laryngitis 04/10/2023    Neuropathy 01/23/2023    Gastroesophageal reflux disease without esophagitis 04/18/2022    Other chest pain 01/20/2022    Chronic low back pain without sciatica 12/01/2020    Type 1 diabetes mellitus without complication (HCC) 06/01/2020    Mixed hyperlipidemia 06/01/2020    Essential hypertension 06/01/2020    Other male erectile dysfunction 06/01/2020       REVIEW OF SYSTEMS:  Gen.:  No Nausea, Vomiting, fever, Chills.  Heart: No chest pain.  Lungs:  No shortness of Breath.  Psychological: Regan unusual Anxiety depression     PHYSICAL EXAM      Constitutional: Alert, cooperative, not in acute distress.  Cardiovascular:  Rate Rhythm is regular without murmurs rubs clicks.     Thorax & Lungs: Clear to auscultation, no  wheezing, rhonchi, or rales  HENT: Normocephalic, Atraumatic.  Eyes: PERRLA, EOMI, Conjunctiva normal.   Neck: Trachia is midline no swelling of the thyroid.   Lymphatic: No lymphadenopathy noted.   Neurologic: Alert & oriented x 3, cranial nerves II through XII are intact, Normal motor function, Normal sensory function, No focal deficits noted.   Psychiatric: Affect normal, Judgment normal, Mood normal.     VITAL SIGNS:BP (!) 148/62 (BP Location: Left arm, Patient Position: Sitting, BP Cuff Size: Adult)   Pulse (!) 58   Temp 36.3 °C (97.3 °F) (Temporal)   Resp 16   Ht 1.829 m (6')   Wt 77.4 kg (170 lb 10.2 oz)   SpO2 96%   BMI 23.14 kg/m²     Labs: Reviewed    Assessment:                                                     Plan:     1. Essential hypertension  Start Benicar  - olmesartan (BENICAR) 20 MG Tab; Take 1 Tablet by mouth every day.  Dispense: 100 Tablet; Refill: 3    2. Vitamin D deficiency  Recheck vitamin D  - VITAMIN D 25-HYDROXY    3. Type 1 diabetes mellitus without complication (HCC)  Recheck hemoglobin A1c increase insulin to 10 units 3 times daily  - HEMOGLOBIN A1C; Future

## 2025-01-22 ENCOUNTER — HOSPITAL ENCOUNTER (OUTPATIENT)
Facility: REHABILITATION | Age: 70
End: 2025-01-22
Attending: STUDENT IN AN ORGANIZED HEALTH CARE EDUCATION/TRAINING PROGRAM | Admitting: STUDENT IN AN ORGANIZED HEALTH CARE EDUCATION/TRAINING PROGRAM
Payer: MEDICARE

## 2025-01-22 ENCOUNTER — APPOINTMENT (OUTPATIENT)
Dept: RADIOLOGY | Facility: REHABILITATION | Age: 70
End: 2025-01-22
Attending: STUDENT IN AN ORGANIZED HEALTH CARE EDUCATION/TRAINING PROGRAM
Payer: MEDICARE

## 2025-01-22 VITALS
DIASTOLIC BLOOD PRESSURE: 75 MMHG | WEIGHT: 166.45 LBS | BODY MASS INDEX: 22.54 KG/M2 | HEIGHT: 72 IN | RESPIRATION RATE: 16 BRPM | SYSTOLIC BLOOD PRESSURE: 167 MMHG | TEMPERATURE: 97.5 F | OXYGEN SATURATION: 98 % | HEART RATE: 64 BPM

## 2025-01-22 PROCEDURE — 64493 INJ PARAVERT F JNT L/S 1 LEV: CPT

## 2025-01-22 PROCEDURE — 700111 HCHG RX REV CODE 636 W/ 250 OVERRIDE (IP): Mod: JZ

## 2025-01-22 PROCEDURE — 700117 HCHG RX CONTRAST REV CODE 255

## 2025-01-22 PROCEDURE — 64494 INJ PARAVERT F JNT L/S 2 LEV: CPT

## 2025-01-22 RX ORDER — LIDOCAINE HYDROCHLORIDE 10 MG/ML
INJECTION, SOLUTION EPIDURAL; INFILTRATION; INTRACAUDAL; PERINEURAL
Status: COMPLETED
Start: 2025-01-22 | End: 2025-01-22

## 2025-01-22 RX ORDER — BUPIVACAINE HYDROCHLORIDE 5 MG/ML
INJECTION, SOLUTION EPIDURAL; INTRACAUDAL
Status: COMPLETED
Start: 2025-01-22 | End: 2025-01-22

## 2025-01-22 RX ADMIN — BUPIVACAINE HYDROCHLORIDE 10 ML: 5 INJECTION, SOLUTION EPIDURAL; INTRACAUDAL at 14:08

## 2025-01-22 RX ADMIN — LIDOCAINE HYDROCHLORIDE 10 ML: 10 INJECTION, SOLUTION EPIDURAL; INFILTRATION; INTRACAUDAL; PERINEURAL at 14:09

## 2025-01-22 RX ADMIN — IOHEXOL 10 ML: 240 INJECTION, SOLUTION INTRATHECAL; INTRAVASCULAR; INTRAVENOUS; ORAL at 14:09

## 2025-01-22 ASSESSMENT — PAIN DESCRIPTION - PAIN TYPE: TYPE: CHRONIC PAIN

## 2025-01-22 ASSESSMENT — FIBROSIS 4 INDEX: FIB4 SCORE: 1.47

## 2025-01-22 NOTE — INTERVAL H&P NOTE
Consented Procedure: Diagnostic medial branch blocks targeting the BILATERAL L4-5 and L5-S1 facet joints with fluoroscopic guidance #1  I have examined the patient, provided the risks, benefits, and alternatives to the procedure(s) indicated on the signed consent form, and the patient wishes to proceed.    H&P reviewed. The patient was examined and there are no changes to the H&P      Haley Herron M.D.  01/22/25 1:56 PM

## 2025-01-22 NOTE — PROGRESS NOTES
1350 Assumed care of pt, report received from EDDIE Carmona.     1436 Pt to recovery area s/p MBB & updates received from procedure RN. Pt reports relief in pain at this time.     1500 Pt ambulates without difficulty & meets DC criteria. RN assisted pt off unit.

## 2025-01-23 NOTE — OP REPORT
"Date of Service: see epic time stamp for DOS    Patient: Sukumar Tyson 69 y.o. male     MRN: [id number]     Physician/s: Haley Herron MD    Pre-operative Diagnosis: Lumbosacral spondylosis, facet arthropathy. The patient was NOT seen for lumbar radiculopathy today.     Post-operative Diagnosis: Lumbosacral spondylosis, facet arthropathy. The patient was NOT seen for lumbar radiculopathy today.     Procedure:  diagnostic lumbar medial branch blocks targeting the bilateral L4-L5 and L5-S1 facet joints    Description of procedure:    The risks, benefits, and alternatives of the procedure were reviewed and discussed with the patient.  Written informed consent was freely obtained. A pre-procedural time-out was conducted by the physician verifying patient’s identity, procedure to be performed, procedure site and side, and allergy verification. Appropriate equipment was determined to be in place for the procedure.     The patient's vital signs were carefully monitored before, throughout, and after the procedure.     In the fluoroscopy suite the patient was placed in a prone position and the skin was prepped and draped in the usual sterile fashion. The fluoroscope was placed over the lower back at the appropriate angles, and the targets for injection were marked. A 27g needle was placed into each of the markings at the levels below, and approx 1cc of 1% Lidocaine was injected subcutaneously into the epidermal and dermal layers. The needle was removed intact.     Using an oblique view, A 25g 3.5\" needle was then placed at the intersection of the transverse process and superior articular process at the L4-5 facet joint where the L3 medial branch runs on the right side. The needle tips were then verified by AP, oblique, and lateral views.     Using an oblique view, A 25g 3.5\" needle was then placed at the intersection of the transverse process and superior articular process at the L5-S1 facet joint where the L4 medial " "branch runs  on the right side. The needle tips were then verified by AP, oblique, and lateral views.     Using an oblique view, A 25g 3.5\" needle was then placed at the intersection of the transverse process and superior articular process at the S1 facet where the L5 dorsal ramus runs  on the right side. The needle tips were then verified by AP, oblique, and lateral views.     Using an oblique view, A 25g 3.5\" needle was then placed at the intersection of the transverse process and superior articular process at the L4-5 facet joint where the L3 medial branch runs on the left side. The needle tips were then verified by AP, oblique, and lateral views.     Using an oblique view, A 25g 3.5\" needle was then placed at the intersection of the transverse process and superior articular process at the L5-S1 facet joint where the L4 medial branch runs  on the left side. The needle tips were then verified by AP, oblique, and lateral views.     Using an oblique view, A 25g 3.5\" needle was then placed at the intersection of the transverse process and superior articular process at the S1 facet where the L5 dorsal ramus runs  on the left side. The needle tips were then verified by AP, oblique, and lateral views.      In the AP view, less than 1 cc of contrast dye was used to highlight the medial branch while the fluoroscope was running live at the levels above. Following negative aspiration, approximately 0.5 ml. of  0.5% bupivacaine was then injected at the above levels, and the needles were removed intact after restyleted. The patient's back was covered with a 4x4 gauze, the area was cleansed with sterile normal saline, and a dressing was applied.       There were no complications noted, the patient was hemodynamically stable, and tolerated the procedure well.     Pre-procedure pain score: 6/10 on NRS  Post-procedure pain score: 5/10 on NRS    Follow-up as scheduled    Haley Herron MD  Interventional Pain and Spine  Physical " Medicine and Rehabilitation  [facility]      CPT  Intraarticular joint or medial branch block (MBB) - lumbar or sacral (1st level):  00535-[id number]  Intraarticular joint or medial branch block (MBB) - lumbar or sacral (2nd level):  11959-xl-[id number]

## 2025-01-28 ENCOUNTER — OFFICE VISIT (OUTPATIENT)
Dept: PHYSICAL MEDICINE AND REHAB | Facility: MEDICAL CENTER | Age: 70
End: 2025-01-28
Payer: MEDICARE

## 2025-01-28 VITALS
SYSTOLIC BLOOD PRESSURE: 122 MMHG | HEART RATE: 51 BPM | HEIGHT: 72 IN | TEMPERATURE: 98.2 F | DIASTOLIC BLOOD PRESSURE: 74 MMHG | BODY MASS INDEX: 23.17 KG/M2 | WEIGHT: 171.08 LBS | OXYGEN SATURATION: 96 %

## 2025-01-28 DIAGNOSIS — G62.9 NEUROPATHY: ICD-10-CM

## 2025-01-28 DIAGNOSIS — M79.10 MYALGIA: ICD-10-CM

## 2025-01-28 DIAGNOSIS — M47.816 LUMBAR SPONDYLOSIS: ICD-10-CM

## 2025-01-28 DIAGNOSIS — M25.551 CHRONIC PAIN OF BOTH HIPS: ICD-10-CM

## 2025-01-28 DIAGNOSIS — M54.50 CHRONIC BILATERAL LOW BACK PAIN WITHOUT SCIATICA: ICD-10-CM

## 2025-01-28 DIAGNOSIS — M25.552 CHRONIC PAIN OF BOTH HIPS: ICD-10-CM

## 2025-01-28 DIAGNOSIS — G89.29 CHRONIC PAIN OF BOTH HIPS: ICD-10-CM

## 2025-01-28 DIAGNOSIS — G89.29 CHRONIC BILATERAL LOW BACK PAIN WITHOUT SCIATICA: ICD-10-CM

## 2025-01-28 PROCEDURE — 3078F DIAST BP <80 MM HG: CPT | Performed by: STUDENT IN AN ORGANIZED HEALTH CARE EDUCATION/TRAINING PROGRAM

## 2025-01-28 PROCEDURE — 99214 OFFICE O/P EST MOD 30 MIN: CPT | Performed by: STUDENT IN AN ORGANIZED HEALTH CARE EDUCATION/TRAINING PROGRAM

## 2025-01-28 PROCEDURE — 3074F SYST BP LT 130 MM HG: CPT | Performed by: STUDENT IN AN ORGANIZED HEALTH CARE EDUCATION/TRAINING PROGRAM

## 2025-01-28 ASSESSMENT — FIBROSIS 4 INDEX: FIB4 SCORE: 1.47

## 2025-01-28 ASSESSMENT — PAIN SCALES - GENERAL: PAINLEVEL_OUTOF10: 5=MODERATE PAIN

## 2025-01-28 ASSESSMENT — PATIENT HEALTH QUESTIONNAIRE - PHQ9: CLINICAL INTERPRETATION OF PHQ2 SCORE: 0

## 2025-01-28 NOTE — H&P (VIEW-ONLY)
Verbal consent was acquired by the patient to use [employer] ambient listening note generation during this visit Yes     Follow up patient note  Interventional spine and Pain  Physiatry (physical medicine and  Rehabilitation)       Chief complaint:   Chief Complaint   Patient presents with    Follow-Up     Post SP          HISTORY (2/17/2023):  Sukumar Tyson is a 67 y.o. male who presents today with a dull constant discomfort in his upper thighs for a couple of months and left big toe for several years. This doesn't limit him from doing activities. He is still able to ski without difficulty or exacerbation of pain. He denies noticeable numbness/tingling or weakness in his legs. He notes a history of right sciatica but denies current symptoms of this.  He was referred for an EMG by his PCP. He remembers getting an EMG at least 20 years ago in [place] which was normal. Notes that his A1c has typically been in the 6% range but his most recent A1c was 7.4%. This is the highest it's been.      Pain right now is 2/10 on the numeric pain scale. His pain at its best-worse level during the course of the day is typically 2-3/10, respectively.  Pain worsens with sitting and side bending or twisting and improves with standing, walking, bending forward, and bending backwards. His pain does not interfere with ADLs. The patient denies weakness, numbness, or bladder/bowel incontinence.     Notes that he has been limited by low back pain and hip pain in the past.  An x-ray in 2017 showed signs of very mild hip OA.  He remembers seeing a doctor who mentioned that his pain could be related to OA.  He has not done dedicated physical therapy for this in the past.     The patient has not done physical therapy for this problem. Has done PT for his back and still does home exercise program      Patient has tried the following medications with varied success (current meds in bold):   Oral naproxen PRN prior to physical activity  primarily for back and hip pain     Medical history includes T1DM, HLD, HTN.    HPI  Patient identification: Sukumar Tyson ,  1955,   With Diagnoses of Lumbar spondylosis, Chronic bilateral low back pain without sciatica, Myalgia, Chronic pain of both hips, and Neuropathy were pertinent to this visit.     History of Present Illness  The patient is a 69-year-old male who presents for a follow-up visit.    He reports an improvement in his condition, with a significant reduction in pain following the diagnostic lumbar medial branch blocks targeting the bilateral L4-L5 and L5-S1 facet joints. He was able to engage in physical activities such as golf, managing to complete 6 to 7 holes without experiencing severe discomfort. He also notes an increased ease in performing movements that previously caused pain, such as bending down and swinging. Upon returning home, he was able to rise and walk immediately, a task that typically required a delay of 15 to 30 seconds due to back pain. He estimates an overall improvement of at least 80 percent.  Since the procedure, he reports a recurrence of pain to its pre-procedure level.     He does not experience any numbness.     He continues to experience left knee pain, but it is not severe enough to warrant intervention at this time.    MEDICATIONS  Current: Tylenol, naproxen, ibuprofen, aspirin    Procedure history:  -5/15/2023 trigger point injections with Dr. Watters  -significant relief.   -24 trigger point injections -1 day of relief  -2025 diagnostic lumbar medial branch blocks targeting the bilateral L4-L5 and L5-S1 facet joints-at least 80% improvement in index pain    ROS Red Flags :   Fever, Chills, Sweats: Denies  Involuntary Weight Loss: Denies  Bowel/Bladder Incontinence: Denies  Saddle Anesthesia: Denies        PMHx:   Past Medical History:   Diagnosis Date    Arrhythmia 10/04/2024    History of SVT 20 years ago.    Diabetes (HCC)     GERD  (gastroesophageal reflux disease)     Glaucoma     High cholesterol     HTN (hypertension)     Neuropathy        PSHx:   Past Surgical History:   Procedure Laterality Date    LUMBAR MEDIAL BRANCH BLOCKS  1/22/2025    Procedure: Diagnostic medial branch blocks targeting the BILATERAL L4-5 and L5-S1 facet joints with fluoroscopic guidance #1;  Surgeon: Haley Herron M.D.;  [facility];  Service: Pain Management    AL LARYNGOSCOPY,DIRCT,OP SCOP,EXC TUMR Right 10/10/2024    Procedure: RIGHT MICRO LARYNGOSCOPY WITH TRUE VOCAL CORD STRIPPING, LASER;  Surgeon: Roni Vidales M.D.;  Location: SURGERY SAME DAY [facility];  Service: Ent    OTHER CARDIAC SURGERY  10/04/2024    Ablation 20 years ago for SVT    TONSILLECTOMY      as a child       Family history   Family History   Problem Relation Age of Onset    No Known Problems Mother     No Known Problems Father          Medications:   Outpatient Medications Marked as Taking for the 1/28/25 encounter (Office Visit) with Haley Herron M.D.   Medication Sig Dispense Refill    olmesartan (BENICAR) 20 MG Tab Take 1 Tablet by mouth every day. 100 Tablet 3    naproxen (NAPROSYN) 250 MG Tab Take 250 mg by mouth 2 times a day with meals.      ibuprofen (MOTRIN) 200 MG Tab Take 200 mg by mouth every 6 hours as needed.      insulin lispro 100 UNIT/ML SC SOPN injection PEN Inject 10 Units under the skin 3 times a day before meals. **MEDICATION INSTRUCTIONS FOR SURGERY/PROCEDURE SCHEDULED FOR 10/10/2024 COORDINATE MEDICATION INSTRUCTIONS FROM PRESCRIBING PHYSICIAN 10 Each 3    atorvastatin (LIPITOR) 10 MG Tab Take 1 Tablet by mouth every day. 90 Tablet 3    loratadine (CLARITIN) 10 MG Tab Take 10 mg by mouth every day.        **MEDICATION INSTRUCTIONS FOR SURGERY/PROCEDURE SCHEDULED FOR 10/10/2024   OK TO CONTINUE TAKING PRIOR TO SURGERY AND DAY OF SURGERY      COLLAGEN PO Take  by mouth every day.          **MEDICATION INSTRUCTIONS FOR SURGERY/PROCEDURE  SCHEDULED FOR 10/10/2024   DO NOT TAKE 7 DAYS PRIOR TO SURGERY      omeprazole (PRILOSEC) 40 MG delayed-release capsule Take 1 Capsule by mouth every day. (Patient taking differently: Take 40 mg by mouth every day.        **MEDICATION INSTRUCTIONS FOR SURGERY/PROCEDURE SCHEDULED FOR 10/10/2024.   OK TO CONTINUE TAKING PRIOR TO SURGERY AND DAY OF SURGERY) 90 Capsule 3    latanoprost (XALATAN) 0.005 % Solution        **MEDICATION INSTRUCTIONS FOR SURGERY/PROCEDURE SCHEDULED FOR 10/10/2024   OK TO CONTINUE TAKING PRIOR TO SURGERY AND NIGHT BEFORE SURGERY      tizanidine (ZANAFLEX) 4 MG Tab Take 0.5-1 Tablets by mouth at bedtime as needed (muscle spasms). 30 Tablet 2    Continuous Blood Gluc Sensor (AdomosSTYLE DON 14 DAY SENSOR) Misc 1 Each every 14 days. 6 Each 3    amLODIPine (NORVASC) 5 MG Tab Take 1 Tablet by mouth every day. (Patient taking differently: Take 5 mg by mouth every day.          **MEDICATION INSTRUCTIONS FOR SURGERY/PROCEDURE SCHEDULED FOR 10/10/2024.   OK TO CONTINUE TAKING PRIOR TO SURGERY AND DAY OF SURGERY) 90 Tablet 3    Insulin Degludec (TRESIBA FLEXTOUCH) 100 UNIT/ML Solution Pen-injector INJECT 50 SUBCUTANEOUSLY ONCE DAILY (Patient taking differently: 10 Units. INJECT 50 SUBCUTANEOUSLY ONCE DAILY            **MEDICATION INSTRUCTIONS FOR SURGERY/PROCEDURE SCHEDULED FOR 10/10/2024   COORDINATE MEDICATION INSTRUCTIONS FROM PRESCRIBING PHYSICIAN) 15 mL 11    Glucagon (GVOKE HYPOPEN 1-PACK) 1 MG/0.2ML Solution Auto-injector Inject 1 mg under the skin one time as needed (hypoglycemia) for up to 1 dose. (Patient taking differently: Inject 1 mg under the skin one time as needed (hypoglycemia).          **MEDICATION INSTRUCTIONS FOR SURGERY/PROCEDURE SCHEDULED FOR 10/10/2024   OK TO CONTINUE TAKING AS PRESCRIBED) 0.2 mL 1    tadalafil (CIALIS) 20 MG tablet Take 1 Tablet by mouth as needed for Erectile Dysfunction. (Patient taking differently: Take 20 mg by mouth as needed for Erectile Dysfunction.           **MEDICATION INSTRUCTIONS FOR SURGERY/PROCEDURE SCHEDULED FOR 10/10/2024   DO NOT TAKE FOR 48 HOURS PRIOR TO SURGERY) 30 Tablet 3    Insulin Pen Needle (PEN NEEDLES) 32G X 5 MM Misc Use to inject insulin 4x daily subq 100 Each 3    Continuous Blood Gluc  (FREESTYLE DON 2 READER) Device Follow box instructions 1 Each 3    Multiple Vitamin (DAILY-JEANNA) Tab Take 1 Tablet by mouth every day.          **MEDICATION INSTRUCTIONS FOR SURGERY/PROCEDURE SCHEDULED FOR 10/10/2024   DO NOT TAKE 7 DAYS PRIOR TO SURGERY      glucosamine Sulfate 500 MG Cap Take 500 mg by mouth 3 times a day with meals. Once a day 1500 mg       **MEDICATION INSTRUCTIONS FOR SURGERY/PROCEDURE SCHEDULED FOR 10/10/2024   DO NOT TAKE 7 DAYS PRIOR TO SURGERY      aspirin (ASA) 81 MG Chew Tab chewable tablet Chew 81 mg every day.          **MEDICATION INSTRUCTIONS FOR SURGERY/PROCEDURE SCHEDULED FOR 10/10/2024   COORDINATE MEDICATION INSTRUCTIONS FROM PRESCRIBING PHYSICIAN.  PATIENT STATES LAST TAKEN ON 10/2/2024          Current Outpatient Medications on File Prior to Visit   Medication Sig Dispense Refill    olmesartan (BENICAR) 20 MG Tab Take 1 Tablet by mouth every day. 100 Tablet 3    naproxen (NAPROSYN) 250 MG Tab Take 250 mg by mouth 2 times a day with meals.      ibuprofen (MOTRIN) 200 MG Tab Take 200 mg by mouth every 6 hours as needed.      insulin lispro 100 UNIT/ML SC SOPN injection PEN Inject 10 Units under the skin 3 times a day before meals. **MEDICATION INSTRUCTIONS FOR SURGERY/PROCEDURE SCHEDULED FOR 10/10/2024 COORDINATE MEDICATION INSTRUCTIONS FROM PRESCRIBING PHYSICIAN 10 Each 3    atorvastatin (LIPITOR) 10 MG Tab Take 1 Tablet by mouth every day. 90 Tablet 3    loratadine (CLARITIN) 10 MG Tab Take 10 mg by mouth every day.        **MEDICATION INSTRUCTIONS FOR SURGERY/PROCEDURE SCHEDULED FOR 10/10/2024   OK TO CONTINUE TAKING PRIOR TO SURGERY AND DAY OF SURGERY      COLLAGEN PO Take  by mouth every day.           **MEDICATION INSTRUCTIONS FOR SURGERY/PROCEDURE SCHEDULED FOR 10/10/2024   DO NOT TAKE 7 DAYS PRIOR TO SURGERY      omeprazole (PRILOSEC) 40 MG delayed-release capsule Take 1 Capsule by mouth every day. (Patient taking differently: Take 40 mg by mouth every day.        **MEDICATION INSTRUCTIONS FOR SURGERY/PROCEDURE SCHEDULED FOR 10/10/2024.   OK TO CONTINUE TAKING PRIOR TO SURGERY AND DAY OF SURGERY) 90 Capsule 3    latanoprost (XALATAN) 0.005 % Solution        **MEDICATION INSTRUCTIONS FOR SURGERY/PROCEDURE SCHEDULED FOR 10/10/2024   OK TO CONTINUE TAKING PRIOR TO SURGERY AND NIGHT BEFORE SURGERY      tizanidine (ZANAFLEX) 4 MG Tab Take 0.5-1 Tablets by mouth at bedtime as needed (muscle spasms). 30 Tablet 2    Continuous Blood Gluc Sensor (FREESTYLE DON 14 DAY SENSOR) Misc 1 Each every 14 days. 6 Each 3    amLODIPine (NORVASC) 5 MG Tab Take 1 Tablet by mouth every day. (Patient taking differently: Take 5 mg by mouth every day.          **MEDICATION INSTRUCTIONS FOR SURGERY/PROCEDURE SCHEDULED FOR 10/10/2024.   OK TO CONTINUE TAKING PRIOR TO SURGERY AND DAY OF SURGERY) 90 Tablet 3    Insulin Degludec (TRESIBA FLEXTOUCH) 100 UNIT/ML Solution Pen-injector INJECT 50 SUBCUTANEOUSLY ONCE DAILY (Patient taking differently: 10 Units. INJECT 50 SUBCUTANEOUSLY ONCE DAILY            **MEDICATION INSTRUCTIONS FOR SURGERY/PROCEDURE SCHEDULED FOR 10/10/2024   COORDINATE MEDICATION INSTRUCTIONS FROM PRESCRIBING PHYSICIAN) 15 mL 11    Glucagon (GVOKE HYPOPEN 1-PACK) 1 MG/0.2ML Solution Auto-injector Inject 1 mg under the skin one time as needed (hypoglycemia) for up to 1 dose. (Patient taking differently: Inject 1 mg under the skin one time as needed (hypoglycemia).          **MEDICATION INSTRUCTIONS FOR SURGERY/PROCEDURE SCHEDULED FOR 10/10/2024   OK TO CONTINUE TAKING AS PRESCRIBED) 0.2 mL 1    tadalafil (CIALIS) 20 MG tablet Take 1 Tablet by mouth as needed for Erectile Dysfunction. (Patient taking differently: Take 20 mg  by mouth as needed for Erectile Dysfunction.          **MEDICATION INSTRUCTIONS FOR SURGERY/PROCEDURE SCHEDULED FOR 10/10/2024   DO NOT TAKE FOR 48 HOURS PRIOR TO SURGERY) 30 Tablet 3    Insulin Pen Needle (PEN NEEDLES) 32G X 5 MM Misc Use to inject insulin 4x daily subq 100 Each 3    Continuous Blood Gluc  (FREESTYLE DON 2 READER) Device Follow box instructions 1 Each 3    Multiple Vitamin (DAILY-JEANNA) Tab Take 1 Tablet by mouth every day.          **MEDICATION INSTRUCTIONS FOR SURGERY/PROCEDURE SCHEDULED FOR 10/10/2024   DO NOT TAKE 7 DAYS PRIOR TO SURGERY      glucosamine Sulfate 500 MG Cap Take 500 mg by mouth 3 times a day with meals. Once a day 1500 mg       **MEDICATION INSTRUCTIONS FOR SURGERY/PROCEDURE SCHEDULED FOR 10/10/2024   DO NOT TAKE 7 DAYS PRIOR TO SURGERY      aspirin (ASA) 81 MG Chew Tab chewable tablet Chew 81 mg every day.          **MEDICATION INSTRUCTIONS FOR SURGERY/PROCEDURE SCHEDULED FOR 10/10/2024   COORDINATE MEDICATION INSTRUCTIONS FROM PRESCRIBING PHYSICIAN.  PATIENT STATES LAST TAKEN ON 10/2/2024       No current facility-administered medications on file prior to visit.         Allergies:   No Known Allergies    Social Hx:   Social History     Socioeconomic History    Marital status:      Spouse name: Not on file    Number of children: Not on file    Years of education: Not on file    Highest education level: Bachelor's degree (e.g., BA, AB, BS)   Occupational History    Not on file   Tobacco Use    Smoking status: Never    Smokeless tobacco: Never   Vaping Use    Vaping status: Never Used   Substance and Sexual Activity    Alcohol use: Yes     Alcohol/week: 1.2 oz     Types: 2 Cans of beer per week     Comment: Occ. beer or wine    Drug use: Never    Sexual activity: Yes     Partners: Female   Other Topics Concern    Not on file   Social History Narrative    Not on file     Social Drivers of Health     Financial Resource Strain: Low Risk  (12/9/2023)    Overall  Financial Resource Strain (CARDIA)     Difficulty of Paying Living Expenses: Not hard at all   Food Insecurity: No Food Insecurity (12/9/2023)    Hunger Vital Sign     Worried About Running Out of Food in the Last Year: Never true     Ran Out of Food in the Last Year: Never true   Transportation Needs: No Transportation Needs (12/9/2023)    PRAPARE - Transportation     Lack of Transportation (Medical): No     Lack of Transportation (Non-Medical): No   Physical Activity: Sufficiently Active (12/9/2023)    Exercise Vital Sign     Days of Exercise per Week: 7 days     Minutes of Exercise per Session: 50 min   Stress: No Stress Concern Present (12/9/2023)    [employer] - Occupational Stress Questionnaire     Feeling of Stress : Not at all   Social Connections: Socially Integrated (12/9/2023)    Social Connection and Isolation Panel [NHANES]     Frequency of Communication with Friends and Family: More than three times a week     Frequency of Social Gatherings with Friends and Family: More than three times a week     Attends Denominational Services: 1 to 4 times per year     Active Member of Clubs or Organizations: Yes     Attends Club or Organization Meetings: More than 4 times per year     Marital Status:    Intimate Partner Violence: Not on file   Housing Stability: Low Risk  (12/9/2023)    Housing Stability Vital Sign     Unable to Pay for Housing in the Last Year: No     Number of Places Lived in the Last Year: 1     Unstable Housing in the Last Year: No         EXAMINATION     Physical Exam:   Vitals: /74 (BP Location: Right arm, Patient Position: Sitting, BP Cuff Size: Adult)   Pulse (!) 51   Temp 36.8 °C (98.2 °F) (Temporal)   Ht 1.829 m (6')   Wt 77.6 kg (171 lb 1.2 oz)   SpO2 96%     Constitutional:   Body Habitus: Body mass index is 23.2 kg/m².  Cooperation: Fully cooperates with exam  Appearance: Well-groomed, well-nourished.     Eyes: No scleral icterus to suggest  "severe liver disease, no proptosis to suggest severe hyperthyroidism     ENT -no obvious auditory deficits, no noticeable facial droop      Skin -no rashes or lesions noted      Respiratory-  breathing comfortably on room air, no audible wheezing     Cardiovascular-distal extremities warm and well perfused.  No lower extremity edema is noted.      Gastrointestinal - no obvious abdominal masses, non-distended     Psychiatric- alert and oriented ×3. Normal affect.      Gait - normal gait, no use of ambulatory device, nonantalgic.      Musculoskeletal and Neuro -      Thoracic/Lumbar Spine/Sacral Spine/Hips   Inspection: No evidence of atrophy in bilateral lower extremities throughout      Palpation:   Tenderness to palpation over the bilateral lumbar facets      Facet loading maneuver positive bilaterally    Lumbar spine /hip provocative exam maneuvers  Straight leg raise negative bilaterally  FADIR test negative bilaterally     SI joint tests  GRACIE test negative bilaterally        Key points for the international standards for neurological classification of spinal cord injury (ISNCSCI) to light touch.   Dermatome R L   L2 2 2   L3 2 2   L4 2 2   L5 2 2   S1 2 2   S2 2 2         Motor Exam Lower Extremities  ? Myotome R L   Hip flexion L2 5 5   Knee extension L3 5 5   Ankle dorsiflexion L4 5 5   Toe extension L5 5 5   Ankle plantarflexion S1 5 5      Previous exam  Reflexes  ?   R L   Patella   1+ 1+   Achilles    1+ 1+      Clonus of the ankle negative bilaterally                MEDICAL DECISION MAKING    DATA    Labs:   Lab Results   Component Value Date/Time    SODIUM 142 10/04/2024 11:21 AM    POTASSIUM 4.2 10/04/2024 11:21 AM    CHLORIDE 107 10/04/2024 11:21 AM    CO2 26 10/04/2024 11:21 AM    GLUCOSE 114 (H) 10/04/2024 11:21 AM    BUN 23 (H) 10/04/2024 11:21 AM    CREATININE 0.92 10/04/2024 11:21 AM        No results found for: \"PROTHROMBTM\", \"INR\"     Lab Results   Component Value Date/Time    WBC 6.7 " 02/29/2024 07:34 AM    RBC 4.95 02/29/2024 07:34 AM    HEMOGLOBIN 15.4 02/29/2024 07:34 AM    HEMATOCRIT 45.9 02/29/2024 07:34 AM    MCV 92.7 02/29/2024 07:34 AM    MCH 31.1 02/29/2024 07:34 AM    MCHC 33.6 02/29/2024 07:34 AM    MPV 11.1 02/29/2024 07:34 AM    NEUTSPOLYS 45.30 02/29/2024 07:34 AM    LYMPHOCYTES 40.50 02/29/2024 07:34 AM    MONOCYTES 9.50 02/29/2024 07:34 AM    EOSINOPHILS 4.20 02/29/2024 07:34 AM    BASOPHILS 0.40 02/29/2024 07:34 AM        Lab Results   Component Value Date/Time    HBA1C 6.8 (H) 12/20/2024 08:38 AM        EMG 3/24/2023  Impression:  This is a normal electrodiagnostic study.  There is no electrodiagnostic evidence of a generalized peripheral polyneuropathy, right median or ulnar mononeuropathy, sural mononeuropathy on either side, tibial mononeuropathy on either side, fibular mononeuropathy on either side, or lumbar plexopathy on either side.        Imaging:   I personally reviewed following images  XR lumbar spine 6/7/23  Facet arthropathy of the bilateral lower lumbar levels.  Anterior body osteophyte at L4 and L5.  Disc space narrowing most notable at L3-4, L4-5, and L5-S1. See formal radiology report for further details.    XR hips 6/7/23  Moderate OA bilaterally. See formal radiology report for further details.     MRI lumbar spine 12/14/2024  At L1-2 there is mild right moderate left neuroforaminal stenosis.  At L2-3 there is mild to moderate central canal stenosis and bilateral neuroforaminal stenosis.  At L3-4 there is moderate central canal stenosis and moderate to severe bilateral neuroforaminal stenosis.  At L4-5 there is severe right and moderate left neuroforaminal stenosis and mild central canal stenosis.  At L5-S1 there is mild central canal stenosis and moderate to severe bilateral neuroforaminal stenosis.  Facet arthropathy at bilateral lower lumbar levels.  See formal radiology report for further details.    I reviewed the following radiology reports   MRI lumbar  spine 12/14/2024  FINDINGS: The lowest formed intervertebral disc will be designated L5-S1 for the purposes of this report and vertebral levels numbered accordingly.        The lumbar vertebral bodies have unremarkable height and no gross malalignment.  No acute or suspicious osseous lesion is seen.  There is loss of intervertebral disc height throughout the lumbar spine, mild at L1-L2 and moderate at the other lumbar levels.  There are prominent Modic type I/II degenerative endplate changes adjacent to the L2-L3 intervertebral disc with Modic type II degenerative endplate changes at L3-L4, L4-L5 and L5-S1.  There are small Schmorl nodes in the inferior endplate of L2, both endplates of L3 and the superior endplate of L4.  The conus medullaris has a normal caliber course and signal intensity.     Level specific findings as follows:     L1-2: Mild diffuse disc bulge. Mild RIGHT and moderate LEFT neural foraminal narrowing.  L2-3: Diffuse disc bulge. Mild to moderate BILATERAL facet arthropathy. Mild to moderate central canal narrowing and BILATERAL neural foraminal narrowing.  L3-4: Diffuse disc bulge. Moderate BILATERAL facet arthropathy. Mild to moderate central canal narrowing. Moderate to severe BILATERAL neural foraminal narrowing.  L4-5: Diffuse disc bulge. Severe RIGHT and moderate to severe LEFT facet arthropathy. Severe RIGHT and moderate LEFT neural foraminal narrowing. Mild central canal narrowing.  L5-S1: Diffuse disc bulge which approximates and probably contacts the traversing BILATERAL S1 nerve roots. Moderate BILATERAL facet arthropathy. Mild central canal narrowing. Moderate to severe BILATERAL neural foraminal narrowing.     There are changes of BILATERAL sacroiliac arthropathy.     Soft tissues: No abdominal aortic aneurysm or soft tissue mass is seen.     IMPRESSION:     1.  Multilevel multifactorial degenerative changes  2.  No areas of high-grade central canal narrowing  3.  Areas of central  canal and neural foraminal narrowing as described above  4.  Disc disease at L5-S1 probably contacts the BILATERAL traversing S1 nerve roots.    X-ray hip 6/22/2017  AP pelvis and left lateral hip films were ordered performed and   interpreted by us today and reveal moderate heart osteoarthritic changes   in both hips with femoral head flattening noticed bilaterally with the   right mi reviewed left and sclerotic changes at the superior acetabular   dome bilaterally and early spur formation superolaterally consistent with   osteoarthritis.                                 XR lumbar spine 6/7/23  FINDINGS:  Vertebral body height is well maintained.  There is no evidence of fracture.  Vertebral alignment is normal.  There is moderate to severe degenerative disc disease and facet arthropathy mainly at the L3-S1 levels.        IMPRESSION:     1.  No compression deformity or acute fracture is identified.     2.  There is significant degenerative disc disease and facet arthropathy throughout the lower lumbar spine.             XR hips 6/7/23  FINDINGS:  There is no evidence of acute fracture involving the pelvis. No proximal femoral fracture is identified.  There is moderate joint space narrowing. A core sclerosis and marginal spurring. There is mild flattening of the right femoral head. There is some linear lucency within the right femoral head.     IMPRESSION:     1.  No radiographic evidence of acute traumatic injury.     2.  Findings are consistent with moderate osteoarthritis bilaterally.     3.  There is some flattening of the right femoral head as well as some lucency in the mid femoral head area. Avascular necrosis is a possibility. Prior right femoral head fracture is also possible.           DIAGNOSIS   Visit Diagnoses     ICD-10-CM   1. Lumbar spondylosis  M47.816   2. Chronic bilateral low back pain without sciatica  M54.50    G89.29   3. Myalgia  M79.10   4. Chronic pain of both hips  M25.551    M25.552     G89.29   5. Neuropathy  G62.9               ASSESSMENT and PLAN:     Sukumar Tyson  1955 male with ongoing low back pain    Exam today notable for tenderness to palpation at bilateral lower lumbar facet joints with exam notable for positive lumbar facet loading maneuver bilaterally reproducing the patient's pain, suggestive of lumbar facetogenic pain.  Imaging shows lumbar spondylosis at bilateral lower lumbar levels.    Does not appear to have hip joint mediated pain at this time, with negative FADIR and log roll and no hx of groin pain.    Darrius was seen today for follow-up.    Diagnoses and all orders for this visit:    Lumbar spondylosis  -     Referral to Pain [facility]    Chronic bilateral low back pain without sciatica    Myalgia    Chronic pain of both hips    Neuropathy              Physical Therapy: At this time, the patient has had persistent pain despite many months of formal physical therapy    Diagnostic workup: Personally reviewed at today's visit: Fluoroscopic images from 25 indicating successful diagnostic lumbar medial branch blocks targeting the bilateral L4-L5 and L5-S1 facet joints    Medications:   -Continue tizanidine and mobic PRN which he has taken with some relief.    Interventions:    -S/p trigger point injections most recently with limited relief  - discussed diagnostic lumbar medial branch blocks #2 targeting Bilateral L4-L5 and L5-S1 facet joints given over 80% relief of index pain after diagnostic lumbar medial branch blocks targeting the bilateral L4-L5 and L5-S1 facet joints #1. OK to continue NSAIDs and aspirin in periprocedural period. The risks, benefits, and alternatives to this procedure were discussed and the patient wishes to proceed with the procedure. Risks include but are not limited to damage to surrounding structures, infection, bleeding, worsening of pain which can be permanent, and weakness which can be permanent. Benefits include pain relief and  improved function. Alternatives include not doing the procedure.    -Discussed possible epidural if his sciatica pain returns and is severe enough to warrant an injection    Other   - Discussed possible imaging of left knee if his pain persists.  Currently his left knee pain is improving and not significantly limiting him    Follow up: 3-7 days after lumbar medial branch blocks    Haley Herron MD  Interventional Pain and Spine  Physical Medicine and Rehabilitation  [facility]

## 2025-02-06 ENCOUNTER — HOSPITAL ENCOUNTER (OUTPATIENT)
Facility: REHABILITATION | Age: 70
End: 2025-02-06
Attending: STUDENT IN AN ORGANIZED HEALTH CARE EDUCATION/TRAINING PROGRAM | Admitting: STUDENT IN AN ORGANIZED HEALTH CARE EDUCATION/TRAINING PROGRAM
Payer: MEDICARE

## 2025-02-06 ENCOUNTER — APPOINTMENT (OUTPATIENT)
Dept: RADIOLOGY | Facility: REHABILITATION | Age: 70
End: 2025-02-06
Attending: STUDENT IN AN ORGANIZED HEALTH CARE EDUCATION/TRAINING PROGRAM
Payer: MEDICARE

## 2025-02-06 VITALS
SYSTOLIC BLOOD PRESSURE: 155 MMHG | DIASTOLIC BLOOD PRESSURE: 76 MMHG | HEART RATE: 64 BPM | HEIGHT: 72 IN | TEMPERATURE: 97 F | OXYGEN SATURATION: 97 % | BODY MASS INDEX: 21.5 KG/M2 | WEIGHT: 158.73 LBS

## 2025-02-06 PROCEDURE — 700111 HCHG RX REV CODE 636 W/ 250 OVERRIDE (IP)

## 2025-02-06 PROCEDURE — 64493 INJ PARAVERT F JNT L/S 1 LEV: CPT

## 2025-02-06 PROCEDURE — 64494 INJ PARAVERT F JNT L/S 2 LEV: CPT

## 2025-02-06 PROCEDURE — 700117 HCHG RX CONTRAST REV CODE 255

## 2025-02-06 RX ORDER — BUPIVACAINE HYDROCHLORIDE 5 MG/ML
INJECTION, SOLUTION EPIDURAL; INTRACAUDAL
Status: COMPLETED
Start: 2025-02-06 | End: 2025-02-06

## 2025-02-06 RX ORDER — LIDOCAINE HYDROCHLORIDE 10 MG/ML
INJECTION, SOLUTION EPIDURAL; INFILTRATION; INTRACAUDAL; PERINEURAL
Status: COMPLETED
Start: 2025-02-06 | End: 2025-02-06

## 2025-02-06 RX ADMIN — LIDOCAINE HYDROCHLORIDE 10 ML: 10 INJECTION, SOLUTION EPIDURAL; INFILTRATION; INTRACAUDAL; PERINEURAL at 09:35

## 2025-02-06 RX ADMIN — BUPIVACAINE HYDROCHLORIDE 10 ML: 5 INJECTION, SOLUTION EPIDURAL; INTRACAUDAL at 09:35

## 2025-02-06 RX ADMIN — IOHEXOL 5 ML: 240 INJECTION, SOLUTION INTRATHECAL; INTRAVASCULAR; INTRAVENOUS; ORAL at 09:35

## 2025-02-06 ASSESSMENT — PAIN DESCRIPTION - PAIN TYPE: TYPE: CHRONIC PAIN

## 2025-02-06 ASSESSMENT — FIBROSIS 4 INDEX: FIB4 SCORE: 1.47

## 2025-02-06 NOTE — INTERVAL H&P NOTE
Consented Procedure: Diagnostic medial branch blocks targeting the BILATERAL L4-5 and L5-S1 facet joints with fluoroscopic guidance #2  I have examined the patient, provided the risks, benefits, and alternatives to the procedure(s) indicated on the signed consent form, and the patient wishes to proceed.    H&P reviewed. The patient was examined and there are no changes to the H&P      Halye Herron M.D.  02/06/25 9:17 AM

## 2025-02-06 NOTE — OP REPORT
"Date of Service: see epic time stamp for DOS    Patient: Sukumar Tyson 69 y.o. male     MRN: [id number]     Physician/s: Haley Herron MD    Pre-operative Diagnosis: Lumbosacral spondylosis, facet arthropathy. The patient was NOT seen for lumbar radiculopathy today.     Post-operative Diagnosis: Lumbosacral spondylosis, facet arthropathy. The patient was NOT seen for lumbar radiculopathy today.     Procedure:  diagnostic lumbar medial branch blocks targeting the bilateral L4-L5 and L5-S1 facet joints    Description of procedure:    The risks, benefits, and alternatives of the procedure were reviewed and discussed with the patient.  Written informed consent was freely obtained. A pre-procedural time-out was conducted by the physician verifying patient’s identity, procedure to be performed, procedure site and side, and allergy verification. Appropriate equipment was determined to be in place for the procedure.     The patient's vital signs were carefully monitored before, throughout, and after the procedure.     In the fluoroscopy suite the patient was placed in a prone position and the skin was prepped and draped in the usual sterile fashion. The fluoroscope was placed over the lower back at the appropriate angles, and the targets for injection were marked. A 27g needle was placed into each of the markings at the levels below, and approx 1cc of 1% Lidocaine was injected subcutaneously into the epidermal and dermal layers. The needle was removed intact.     Using an oblique view, A 25g 3.5\" needle was then placed at the intersection of the transverse process and superior articular process at the L4-5 facet joint where the L3 medial branch runs on the right side. The needle tips were then verified by AP, oblique, and lateral views.     Using an oblique view, A 25g 3.5\" needle was then placed at the intersection of the transverse process and superior articular process at the L5-S1 facet joint where the L4 medial " "branch runs  on the right side. The needle tips were then verified by AP, oblique, and lateral views.     Using an oblique view, A 25g 3.5\" needle was then placed at the intersection of the transverse process and superior articular process at the S1 facet where the L5 dorsal ramus runs  on the right side. The needle tips were then verified by AP, oblique, and lateral views.     Using an oblique view, A 25g 3.5\" needle was then placed at the intersection of the transverse process and superior articular process at the L4-5 facet joint where the L3 medial branch runs on the left side. The needle tips were then verified by AP, oblique, and lateral views.     Using an oblique view, A 25g 3.5\" needle was then placed at the intersection of the transverse process and superior articular process at the L5-S1 facet joint where the L4 medial branch runs  on the left side. The needle tips were then verified by AP, oblique, and lateral views.     Using an oblique view, A 25g 3.5\" needle was then placed at the intersection of the transverse process and superior articular process at the S1 facet where the L5 dorsal ramus runs  on the left side. The needle tips were then verified by AP, oblique, and lateral views.      In the AP view, less than 1 cc of contrast dye was used to highlight the medial branch while the fluoroscope was running live at the levels above. Following negative aspiration, approximately 0.5 ml. of  0.5% bupivacaine was then injected at the above levels, and the needles were removed intact after restyleted. The patient's back was covered with a 4x4 gauze, the area was cleansed with sterile normal saline, and a dressing was applied.       There were no complications noted, the patient was hemodynamically stable, and tolerated the procedure well.     Pre-procedure pain score: 2/10 on NRS  Post-procedure pain score: 2/10 on NRS    Follow-up as scheduled    Haley Herron MD  Interventional Pain and Spine  Physical " Medicine and Rehabilitation  [facility]      CPT  Intraarticular joint or medial branch block (MBB) - lumbar or sacral (1st level):  [id number]  Intraarticular joint or medial branch block (MBB) - lumbar or sacral (2nd level):  80605-uf-[id number]

## 2025-02-11 ENCOUNTER — OFFICE VISIT (OUTPATIENT)
Dept: PHYSICAL MEDICINE AND REHAB | Facility: MEDICAL CENTER | Age: 70
End: 2025-02-11
Payer: MEDICARE

## 2025-02-11 VITALS
HEIGHT: 72 IN | HEART RATE: 53 BPM | DIASTOLIC BLOOD PRESSURE: 73 MMHG | TEMPERATURE: 97.5 F | SYSTOLIC BLOOD PRESSURE: 147 MMHG | WEIGHT: 169.75 LBS | OXYGEN SATURATION: 98 % | BODY MASS INDEX: 22.99 KG/M2

## 2025-02-11 DIAGNOSIS — M79.18 GLUTEAL PAIN: ICD-10-CM

## 2025-02-11 DIAGNOSIS — M25.362 KNEE INSTABILITY, LEFT: ICD-10-CM

## 2025-02-11 DIAGNOSIS — M25.562 ACUTE PAIN OF LEFT KNEE: ICD-10-CM

## 2025-02-11 DIAGNOSIS — M54.50 CHRONIC BILATERAL LOW BACK PAIN WITHOUT SCIATICA: ICD-10-CM

## 2025-02-11 DIAGNOSIS — G62.9 NEUROPATHY: ICD-10-CM

## 2025-02-11 DIAGNOSIS — M67.951 TENDINOPATHY OF RIGHT GLUTEUS MEDIUS: ICD-10-CM

## 2025-02-11 DIAGNOSIS — M25.551 CHRONIC PAIN OF BOTH HIPS: ICD-10-CM

## 2025-02-11 DIAGNOSIS — M25.562 CHRONIC PAIN OF LEFT KNEE: ICD-10-CM

## 2025-02-11 DIAGNOSIS — M25.552 CHRONIC PAIN OF BOTH HIPS: ICD-10-CM

## 2025-02-11 DIAGNOSIS — G89.29 CHRONIC PAIN OF LEFT KNEE: ICD-10-CM

## 2025-02-11 DIAGNOSIS — M47.816 LUMBAR SPONDYLOSIS: ICD-10-CM

## 2025-02-11 DIAGNOSIS — G89.29 CHRONIC PAIN OF BOTH HIPS: ICD-10-CM

## 2025-02-11 DIAGNOSIS — G89.29 CHRONIC BILATERAL LOW BACK PAIN WITHOUT SCIATICA: ICD-10-CM

## 2025-02-11 DIAGNOSIS — M79.10 MYALGIA: ICD-10-CM

## 2025-02-11 PROCEDURE — 99214 OFFICE O/P EST MOD 30 MIN: CPT | Performed by: STUDENT IN AN ORGANIZED HEALTH CARE EDUCATION/TRAINING PROGRAM

## 2025-02-11 PROCEDURE — 1125F AMNT PAIN NOTED PAIN PRSNT: CPT | Performed by: STUDENT IN AN ORGANIZED HEALTH CARE EDUCATION/TRAINING PROGRAM

## 2025-02-11 PROCEDURE — 3078F DIAST BP <80 MM HG: CPT | Performed by: STUDENT IN AN ORGANIZED HEALTH CARE EDUCATION/TRAINING PROGRAM

## 2025-02-11 PROCEDURE — 3077F SYST BP >= 140 MM HG: CPT | Performed by: STUDENT IN AN ORGANIZED HEALTH CARE EDUCATION/TRAINING PROGRAM

## 2025-02-11 PROCEDURE — 76882 US LMTD JT/FCL EVL NVASC XTR: CPT | Performed by: STUDENT IN AN ORGANIZED HEALTH CARE EDUCATION/TRAINING PROGRAM

## 2025-02-11 ASSESSMENT — PAIN SCALES - GENERAL: PAINLEVEL_OUTOF10: 7=MODERATE-SEVERE PAIN

## 2025-02-11 ASSESSMENT — FIBROSIS 4 INDEX: FIB4 SCORE: 1.47

## 2025-02-11 ASSESSMENT — PATIENT HEALTH QUESTIONNAIRE - PHQ9: CLINICAL INTERPRETATION OF PHQ2 SCORE: 0

## 2025-02-11 NOTE — PROGRESS NOTES
Verbal consent was acquired by the patient to use [email or web address] ambient listening note generation during this visit Yes     Follow up patient note  Interventional spine and Pain  Physiatry (physical medicine and  Rehabilitation)       Chief complaint:   Chief Complaint   Patient presents with    Follow-Up     Post SP          HISTORY (2/17/2023):  Sukumar Tyson is a 67 y.o. male who presents today with a dull constant discomfort in his upper thighs for a couple of months and left big toe for several years. This doesn't limit him from doing activities. He is still able to ski without difficulty or exacerbation of pain. He denies noticeable numbness/tingling or weakness in his legs. He notes a history of right sciatica but denies current symptoms of this.  He was referred for an EMG by his PCP. He remembers getting an EMG at least 20 years ago in [place] which was normal. Notes that his A1c has typically been in the 6% range but his most recent A1c was 7.4%. This is the highest it's been.      Pain right now is 2/10 on the numeric pain scale. His pain at its best-worse level during the course of the day is typically 2-3/10, respectively.  Pain worsens with sitting and side bending or twisting and improves with standing, walking, bending forward, and bending backwards. His pain does not interfere with ADLs. The patient denies weakness, numbness, or bladder/bowel incontinence.     Notes that he has been limited by low back pain and hip pain in the past.  An x-ray in 2017 showed signs of very mild hip OA.  He remembers seeing a doctor who mentioned that his pain could be related to OA.  He has not done dedicated physical therapy for this in the past.     The patient has not done physical therapy for this problem. Has done PT for his back and still does home exercise program      Patient has tried the following medications with varied success (current meds in bold):   Oral naproxen PRN prior to physical activity  primarily for back and hip pain     Medical history includes T1DM, HLD, HTN.    HPI  Patient identification: Sukumar Tyson ,  1955,   With Diagnoses of Lumbar spondylosis, Knee instability, left, Chronic pain of left knee, Chronic bilateral low back pain without sciatica, Chronic pain of both hips, Neuropathy, Gluteal pain, Myalgia, Tendinopathy of right gluteus medius, and Acute pain of left knee were pertinent to this visit.     Presents today after  25 diagnostic lumbar medial branch blocks targeting the bilateral L4-L5 and L5-S1 facet joints with over 80% treatment of index pain.  He reports that his low back pain has returned and he would like to proceed with radiofrequency ablation.    He reports that yesterday he had an incident while skiing where he was getting off the lift and somebody stepped on his knee, he fell forward onto his knees and immediately had severe left knee pain afterwards.  The pain is primarily located at his medial left knee.  He reports a sensation of instability but his left knee has not given out.  He has not gone skiing or golfing since then.  He denies history of knee pain.      Procedure history:  -5/15/2023 trigger point injections with Dr. Watters  -significant relief.   -24 trigger point injections -1 day of relief  -2025 diagnostic lumbar medial branch blocks targeting the bilateral L4-L5 and L5-S1 facet joints-at least 80% improvement in index pain  -25 diagnostic lumbar medial branch blocks targeting the bilateral L4-L5 and L5-S1 facet joints-at least 80% improvement in index pain    ROS Red Flags :   Fever, Chills, Sweats: Denies  Involuntary Weight Loss: Denies  Bowel/Bladder Incontinence: Denies  Saddle Anesthesia: Denies        PMHx:   Past Medical History:   Diagnosis Date    Arrhythmia 10/04/2024    History of SVT 20 years ago.    Diabetes (HCC)     GERD (gastroesophageal reflux disease)     Glaucoma     High cholesterol     HTN  (hypertension)     Neuropathy        PSHx:   Past Surgical History:   Procedure Laterality Date    LUMBAR MEDIAL BRANCH BLOCKS Bilateral 2/6/2025    Procedure: Diagnostic medial branch blocks targeting the BILATERAL L4-5 and L5-S1 facet joints with fluoroscopic guidance #2;  Surgeon: Haley Herron M.D.;  [facility];  Service: Pain Management    LUMBAR MEDIAL BRANCH BLOCKS  1/22/2025    Procedure: Diagnostic medial branch blocks targeting the BILATERAL L4-5 and L5-S1 facet joints with fluoroscopic guidance #1;  Surgeon: Haley Herron M.D.;  Location: [facility];  Service: Pain Management    VT LARYNGOSCOPY,DIRCT,OP SCOP,EXC TUMR Right 10/10/2024    Procedure: RIGHT MICRO LARYNGOSCOPY WITH TRUE VOCAL CORD STRIPPING, LASER;  Surgeon: Roni Vidales M.D.;  Location: SURGERY SAME [facility];  Service: Ent    OTHER CARDIAC SURGERY  10/04/2024    Ablation 20 years ago for SVT    TONSILLECTOMY      as a child       Family history   Family History   Problem Relation Age of Onset    No Known Problems Mother     No Known Problems Father          Medications:   Outpatient Medications Marked as Taking for the 2/11/25 encounter (Office Visit) with Haley Herron M.D.   Medication Sig Dispense Refill    olmesartan (BENICAR) 20 MG Tab Take 1 Tablet by mouth every day. 100 Tablet 3    naproxen (NAPROSYN) 250 MG Tab Take 250 mg by mouth 2 times a day with meals.      ibuprofen (MOTRIN) 200 MG Tab Take 200 mg by mouth every 6 hours as needed.      insulin lispro 100 UNIT/ML SC SOPN injection PEN Inject 10 Units under the skin 3 times a day before meals. **MEDICATION INSTRUCTIONS FOR SURGERY/PROCEDURE SCHEDULED FOR 10/10/2024 COORDINATE MEDICATION INSTRUCTIONS FROM PRESCRIBING PHYSICIAN 10 Each 3    atorvastatin (LIPITOR) 10 MG Tab Take 1 Tablet by mouth every day. 90 Tablet 3    loratadine (CLARITIN) 10 MG Tab Take 10 mg by mouth every day.        **MEDICATION INSTRUCTIONS FOR  SURGERY/PROCEDURE SCHEDULED FOR 10/10/2024   OK TO CONTINUE TAKING PRIOR TO SURGERY AND DAY OF SURGERY      COLLAGEN PO Take  by mouth every day.          **MEDICATION INSTRUCTIONS FOR SURGERY/PROCEDURE SCHEDULED FOR 10/10/2024   DO NOT TAKE 7 DAYS PRIOR TO SURGERY      omeprazole (PRILOSEC) 40 MG delayed-release capsule Take 1 Capsule by mouth every day. (Patient taking differently: Take 40 mg by mouth every day.        **MEDICATION INSTRUCTIONS FOR SURGERY/PROCEDURE SCHEDULED FOR 10/10/2024.   OK TO CONTINUE TAKING PRIOR TO SURGERY AND DAY OF SURGERY) 90 Capsule 3    latanoprost (XALATAN) 0.005 % Solution        **MEDICATION INSTRUCTIONS FOR SURGERY/PROCEDURE SCHEDULED FOR 10/10/2024   OK TO CONTINUE TAKING PRIOR TO SURGERY AND NIGHT BEFORE SURGERY      tizanidine (ZANAFLEX) 4 MG Tab Take 0.5-1 Tablets by mouth at bedtime as needed (muscle spasms). 30 Tablet 2    Continuous Blood Gluc Sensor (FREESTYLE DON 14 DAY SENSOR) Misc 1 Each every 14 days. 6 Each 3    amLODIPine (NORVASC) 5 MG Tab Take 1 Tablet by mouth every day. (Patient taking differently: Take 5 mg by mouth every day.          **MEDICATION INSTRUCTIONS FOR SURGERY/PROCEDURE SCHEDULED FOR 10/10/2024.   OK TO CONTINUE TAKING PRIOR TO SURGERY AND DAY OF SURGERY) 90 Tablet 3    Insulin Degludec (TRESIBA FLEXTOUCH) 100 UNIT/ML Solution Pen-injector INJECT 50 SUBCUTANEOUSLY ONCE DAILY (Patient taking differently: 10 Units. INJECT 50 SUBCUTANEOUSLY ONCE DAILY            **MEDICATION INSTRUCTIONS FOR SURGERY/PROCEDURE SCHEDULED FOR 10/10/2024   COORDINATE MEDICATION INSTRUCTIONS FROM PRESCRIBING PHYSICIAN) 15 mL 11    Glucagon (GVOKE HYPOPEN 1-PACK) 1 MG/0.2ML Solution Auto-injector Inject 1 mg under the skin one time as needed (hypoglycemia) for up to 1 dose. (Patient taking differently: Inject 1 mg under the skin one time as needed (hypoglycemia).          **MEDICATION INSTRUCTIONS FOR SURGERY/PROCEDURE SCHEDULED FOR 10/10/2024   OK TO CONTINUE TAKING AS  PRESCRIBED) 0.2 mL 1    tadalafil (CIALIS) 20 MG tablet Take 1 Tablet by mouth as needed for Erectile Dysfunction. (Patient taking differently: Take 20 mg by mouth as needed for Erectile Dysfunction.          **MEDICATION INSTRUCTIONS FOR SURGERY/PROCEDURE SCHEDULED FOR 10/10/2024   DO NOT TAKE FOR 48 HOURS PRIOR TO SURGERY) 30 Tablet 3    Insulin Pen Needle (PEN NEEDLES) 32G X 5 MM Misc Use to inject insulin 4x daily subq 100 Each 3    Continuous Blood Gluc  (FREESTYLE DON 2 READER) Device Follow box instructions 1 Each 3    Multiple Vitamin (DAILY-JEANNA) Tab Take 1 Tablet by mouth every day.          **MEDICATION INSTRUCTIONS FOR SURGERY/PROCEDURE SCHEDULED FOR 10/10/2024   DO NOT TAKE 7 DAYS PRIOR TO SURGERY      glucosamine Sulfate 500 MG Cap Take 500 mg by mouth 3 times a day with meals. Once a day 1500 mg       **MEDICATION INSTRUCTIONS FOR SURGERY/PROCEDURE SCHEDULED FOR 10/10/2024   DO NOT TAKE 7 DAYS PRIOR TO SURGERY      aspirin (ASA) 81 MG Chew Tab chewable tablet Chew 81 mg every day.          **MEDICATION INSTRUCTIONS FOR SURGERY/PROCEDURE SCHEDULED FOR 10/10/2024   COORDINATE MEDICATION INSTRUCTIONS FROM PRESCRIBING PHYSICIAN.  PATIENT STATES LAST TAKEN ON 10/2/2024          Current Outpatient Medications on File Prior to Visit   Medication Sig Dispense Refill    olmesartan (BENICAR) 20 MG Tab Take 1 Tablet by mouth every day. 100 Tablet 3    naproxen (NAPROSYN) 250 MG Tab Take 250 mg by mouth 2 times a day with meals.      ibuprofen (MOTRIN) 200 MG Tab Take 200 mg by mouth every 6 hours as needed.      insulin lispro 100 UNIT/ML SC SOPN injection PEN Inject 10 Units under the skin 3 times a day before meals. **MEDICATION INSTRUCTIONS FOR SURGERY/PROCEDURE SCHEDULED FOR 10/10/2024 COORDINATE MEDICATION INSTRUCTIONS FROM PRESCRIBING PHYSICIAN 10 Each 3    atorvastatin (LIPITOR) 10 MG Tab Take 1 Tablet by mouth every day. 90 Tablet 3    loratadine (CLARITIN) 10 MG Tab Take 10 mg by mouth every  day.        **MEDICATION INSTRUCTIONS FOR SURGERY/PROCEDURE SCHEDULED FOR 10/10/2024   OK TO CONTINUE TAKING PRIOR TO SURGERY AND DAY OF SURGERY      COLLAGEN PO Take  by mouth every day.          **MEDICATION INSTRUCTIONS FOR SURGERY/PROCEDURE SCHEDULED FOR 10/10/2024   DO NOT TAKE 7 DAYS PRIOR TO SURGERY      omeprazole (PRILOSEC) 40 MG delayed-release capsule Take 1 Capsule by mouth every day. (Patient taking differently: Take 40 mg by mouth every day.        **MEDICATION INSTRUCTIONS FOR SURGERY/PROCEDURE SCHEDULED FOR 10/10/2024.   OK TO CONTINUE TAKING PRIOR TO SURGERY AND DAY OF SURGERY) 90 Capsule 3    latanoprost (XALATAN) 0.005 % Solution        **MEDICATION INSTRUCTIONS FOR SURGERY/PROCEDURE SCHEDULED FOR 10/10/2024   OK TO CONTINUE TAKING PRIOR TO SURGERY AND NIGHT BEFORE SURGERY      tizanidine (ZANAFLEX) 4 MG Tab Take 0.5-1 Tablets by mouth at bedtime as needed (muscle spasms). 30 Tablet 2    Continuous Blood Gluc Sensor (FREESTYLE DON 14 DAY SENSOR) Misc 1 Each every 14 days. 6 Each 3    amLODIPine (NORVASC) 5 MG Tab Take 1 Tablet by mouth every day. (Patient taking differently: Take 5 mg by mouth every day.          **MEDICATION INSTRUCTIONS FOR SURGERY/PROCEDURE SCHEDULED FOR 10/10/2024.   OK TO CONTINUE TAKING PRIOR TO SURGERY AND DAY OF SURGERY) 90 Tablet 3    Insulin Degludec (TRESIBA FLEXTOUCH) 100 UNIT/ML Solution Pen-injector INJECT 50 SUBCUTANEOUSLY ONCE DAILY (Patient taking differently: 10 Units. INJECT 50 SUBCUTANEOUSLY ONCE DAILY            **MEDICATION INSTRUCTIONS FOR SURGERY/PROCEDURE SCHEDULED FOR 10/10/2024   COORDINATE MEDICATION INSTRUCTIONS FROM PRESCRIBING PHYSICIAN) 15 mL 11    Glucagon (GVOKE HYPOPEN 1-PACK) 1 MG/0.2ML Solution Auto-injector Inject 1 mg under the skin one time as needed (hypoglycemia) for up to 1 dose. (Patient taking differently: Inject 1 mg under the skin one time as needed (hypoglycemia).          **MEDICATION INSTRUCTIONS FOR SURGERY/PROCEDURE SCHEDULED  FOR 10/10/2024   OK TO CONTINUE TAKING AS PRESCRIBED) 0.2 mL 1    tadalafil (CIALIS) 20 MG tablet Take 1 Tablet by mouth as needed for Erectile Dysfunction. (Patient taking differently: Take 20 mg by mouth as needed for Erectile Dysfunction.          **MEDICATION INSTRUCTIONS FOR SURGERY/PROCEDURE SCHEDULED FOR 10/10/2024   DO NOT TAKE FOR 48 HOURS PRIOR TO SURGERY) 30 Tablet 3    Insulin Pen Needle (PEN NEEDLES) 32G X 5 MM Misc Use to inject insulin 4x daily subq 100 Each 3    Continuous Blood Gluc  (FREESTYLE DON 2 READER) Device Follow box instructions 1 Each 3    Multiple Vitamin (DAILY-JEANNA) Tab Take 1 Tablet by mouth every day.          **MEDICATION INSTRUCTIONS FOR SURGERY/PROCEDURE SCHEDULED FOR 10/10/2024   DO NOT TAKE 7 DAYS PRIOR TO SURGERY      glucosamine Sulfate 500 MG Cap Take 500 mg by mouth 3 times a day with meals. Once a day 1500 mg       **MEDICATION INSTRUCTIONS FOR SURGERY/PROCEDURE SCHEDULED FOR 10/10/2024   DO NOT TAKE 7 DAYS PRIOR TO SURGERY      aspirin (ASA) 81 MG Chew Tab chewable tablet Chew 81 mg every day.          **MEDICATION INSTRUCTIONS FOR SURGERY/PROCEDURE SCHEDULED FOR 10/10/2024   COORDINATE MEDICATION INSTRUCTIONS FROM PRESCRIBING PHYSICIAN.  PATIENT STATES LAST TAKEN ON 10/2/2024       No current facility-administered medications on file prior to visit.         Allergies:   No Known Allergies    Social Hx:   Social History     Socioeconomic History    Marital status:      Spouse name: Not on file    Number of children: Not on file    Years of education: Not on file    Highest education level: Bachelor's degree (e.g., BA, AB, BS)   Occupational History    Not on file   Tobacco Use    Smoking status: Never    Smokeless tobacco: Never   Vaping Use    Vaping status: Never Used   Substance and Sexual Activity    Alcohol use: Yes     Alcohol/week: 1.2 oz     Types: 2 Cans of beer per week     Comment: Occ. beer or wine    Drug use: Never    Sexual activity: Yes      Partners: Female   Other Topics Concern    Not on file   Social History Narrative    Not on file     Social Drivers of Health     Financial Resource Strain: Low Risk  (12/9/2023)    Overall Financial Resource Strain (CARDIA)     Difficulty of Paying Living Expenses: Not hard at all   Food Insecurity: No Food Insecurity (12/9/2023)    Hunger Vital Sign     Worried About Running Out of Food in the Last Year: Never true     Ran Out of Food in the Last Year: Never true   Transportation Needs: No Transportation Needs (12/9/2023)    PRAPARE - Transportation     Lack of Transportation (Medical): No     Lack of Transportation (Non-Medical): No   Physical Activity: Sufficiently Active (12/9/2023)    Exercise Vital Sign     Days of Exercise per Week: 7 days     Minutes of Exercise per Session: 50 min   Stress: No Stress Concern Present (12/9/2023)    [employer] [place] [employer] - Occupational Stress Questionnaire     Feeling of Stress : Not at all   Social Connections: Socially Integrated (12/9/2023)    Social Connection and Isolation Panel [NHANES]     Frequency of Communication with Friends and Family: More than three times a week     Frequency of Social Gatherings with Friends and Family: More than three times a week     Attends Restorationist Services: 1 to 4 times per year     Active Member of Clubs or Organizations: Yes     Attends Club or Organization Meetings: More than 4 times per year     Marital Status:    Intimate Partner Violence: Not on file   Housing Stability: Low Risk  (12/9/2023)    Housing Stability Vital Sign     Unable to Pay for Housing in the Last Year: No     Number of Places Lived in the Last Year: 1     Unstable Housing in the Last Year: No         EXAMINATION     Physical Exam:   Vitals: BP (!) 147/73 (BP Location: Right arm, Patient Position: Sitting, BP Cuff Size: Adult)   Pulse (!) 53   Temp 36.4 °C (97.5 °F) (Temporal)   Ht 1.829 m (6')   Wt 77 kg (169 lb 12.1 oz)   SpO2 98%      Constitutional:   Body Habitus: Body mass index is 23.02 kg/m².  Cooperation: Fully cooperates with exam  Appearance: Well-groomed, well-nourished.     Eyes: No scleral icterus to suggest severe liver disease, no proptosis to suggest severe hyperthyroidism     ENT -no obvious auditory deficits, no noticeable facial droop      Skin -no rashes or lesions noted      Respiratory-  breathing comfortably on room air, no audible wheezing     Cardiovascular-distal extremities warm and well perfused.  No lower extremity edema is noted.      Gastrointestinal - no obvious abdominal masses, non-distended     Psychiatric- alert and oriented ×3. Normal affect.      Gait - normal gait, no use of ambulatory device, nonantalgic.      Musculoskeletal and Neuro -      Left knee anterior drawer, Lachman's, varus and valgus stress test negative.  Tyrone's positive for reproducing medial knee pain    Tenderness to palpation at medial left knee.    Thoracic/Lumbar Spine/Sacral Spine/Hips   Inspection: No evidence of atrophy in bilateral lower extremities throughout      Palpation:   Tenderness to palpation over the bilateral lumbar facets      Facet loading maneuver positive bilaterally        Lumbar spine /hip provocative exam maneuvers  Straight leg raise negative bilaterally  FADIR test negative bilaterally     SI joint tests  GRACIE test negative bilaterally        Key points for the international standards for neurological classification of spinal cord injury (ISNCSCI) to light touch.   Dermatome R L   L2 2 2   L3 2 2   L4 2 2   L5 2 2   S1 2 2   S2 2 2         Motor Exam Lower Extremities  ? Myotome R L   Hip flexion L2 5 5   Knee extension L3 5 5   Ankle dorsiflexion L4 5 5   Toe extension L5 5 5   Ankle plantarflexion S1 5 5      Previous exam  Reflexes  ?   R L   Patella   1+ 1+   Achilles    1+ 1+      Clonus of the ankle negative bilaterally                MEDICAL DECISION MAKING    DATA    Labs:   Lab Results   Component  "Value Date/Time    SODIUM 142 10/04/2024 11:21 AM    POTASSIUM 4.2 10/04/2024 11:21 AM    CHLORIDE 107 10/04/2024 11:21 AM    CO2 26 10/04/2024 11:21 AM    GLUCOSE 114 (H) 10/04/2024 11:21 AM    BUN 23 (H) 10/04/2024 11:21 AM    CREATININE 0.92 10/04/2024 11:21 AM        No results found for: \"PROTHROMBTM\", \"INR\"     Lab Results   Component Value Date/Time    WBC 6.7 02/29/2024 07:34 AM    RBC 4.95 02/29/2024 07:34 AM    HEMOGLOBIN 15.4 02/29/2024 07:34 AM    HEMATOCRIT 45.9 02/29/2024 07:34 AM    MCV 92.7 02/29/2024 07:34 AM    MCH 31.1 02/29/2024 07:34 AM    MCHC 33.6 02/29/2024 07:34 AM    MPV 11.1 02/29/2024 07:34 AM    NEUTSPOLYS 45.30 02/29/2024 07:34 AM    LYMPHOCYTES 40.50 02/29/2024 07:34 AM    MONOCYTES 9.50 02/29/2024 07:34 AM    EOSINOPHILS 4.20 02/29/2024 07:34 AM    BASOPHILS 0.40 02/29/2024 07:34 AM        Lab Results   Component Value Date/Time    HBA1C 6.8 (H) 12/20/2024 08:38 AM        Limited diagnostic ultrasound 2/11/2025 by Dr. Herron of the patient's left knee  Moderate joint effusion seen at suprapatellar recess. No obvious derangement of visualized meniscus at medial and lateral joint lines.  Images uploaded for permanent storage.    EMG 3/24/2023  Impression:  This is a normal electrodiagnostic study.  There is no electrodiagnostic evidence of a generalized peripheral polyneuropathy, right median or ulnar mononeuropathy, sural mononeuropathy on either side, tibial mononeuropathy on either side, fibular mononeuropathy on either side, or lumbar plexopathy on either side.        Imaging:   I personally reviewed following images  XR lumbar spine 6/7/23  Facet arthropathy of the bilateral lower lumbar levels.  Anterior body osteophyte at L4 and L5.  Disc space narrowing most notable at L3-4, L4-5, and L5-S1. See formal radiology report for further details.    XR hips 6/7/23  Moderate OA bilaterally. See formal radiology report for further details.     MRI lumbar spine 12/14/2024  At L1-2 there is " mild right moderate left neuroforaminal stenosis.  At L2-3 there is mild to moderate central canal stenosis and bilateral neuroforaminal stenosis.  At L3-4 there is moderate central canal stenosis and moderate to severe bilateral neuroforaminal stenosis.  At L4-5 there is severe right and moderate left neuroforaminal stenosis and mild central canal stenosis.  At L5-S1 there is mild central canal stenosis and moderate to severe bilateral neuroforaminal stenosis.  Facet arthropathy at bilateral lower lumbar levels.  See formal radiology report for further details.    I reviewed the following radiology reports   MRI lumbar spine 12/14/2024  FINDINGS: The lowest formed intervertebral disc will be designated L5-S1 for the purposes of this report and vertebral levels numbered accordingly.        The lumbar vertebral bodies have unremarkable height and no gross malalignment.  No acute or suspicious osseous lesion is seen.  There is loss of intervertebral disc height throughout the lumbar spine, mild at L1-L2 and moderate at the other lumbar levels.  There are prominent Modic type I/II degenerative endplate changes adjacent to the L2-L3 intervertebral disc with Modic type II degenerative endplate changes at L3-L4, L4-L5 and L5-S1.  There are small Schmorl nodes in the inferior endplate of L2, both endplates of L3 and the superior endplate of L4.  The conus medullaris has a normal caliber course and signal intensity.     Level specific findings as follows:     L1-2: Mild diffuse disc bulge. Mild RIGHT and moderate LEFT neural foraminal narrowing.  L2-3: Diffuse disc bulge. Mild to moderate BILATERAL facet arthropathy. Mild to moderate central canal narrowing and BILATERAL neural foraminal narrowing.  L3-4: Diffuse disc bulge. Moderate BILATERAL facet arthropathy. Mild to moderate central canal narrowing. Moderate to severe BILATERAL neural foraminal narrowing.  L4-5: Diffuse disc bulge. Severe RIGHT and moderate to severe  LEFT facet arthropathy. Severe RIGHT and moderate LEFT neural foraminal narrowing. Mild central canal narrowing.  L5-S1: Diffuse disc bulge which approximates and probably contacts the traversing BILATERAL S1 nerve roots. Moderate BILATERAL facet arthropathy. Mild central canal narrowing. Moderate to severe BILATERAL neural foraminal narrowing.     There are changes of BILATERAL sacroiliac arthropathy.     Soft tissues: No abdominal aortic aneurysm or soft tissue mass is seen.     IMPRESSION:     1.  Multilevel multifactorial degenerative changes  2.  No areas of high-grade central canal narrowing  3.  Areas of central canal and neural foraminal narrowing as described above  4.  Disc disease at L5-S1 probably contacts the BILATERAL traversing S1 nerve roots.    X-ray hip 6/22/2017  AP pelvis and left lateral hip films were ordered performed and   interpreted by us today and reveal moderate heart osteoarthritic changes   in both hips with femoral head flattening noticed bilaterally with the   right mi reviewed left and sclerotic changes at the superior acetabular   dome bilaterally and early spur formation superolaterally consistent with   osteoarthritis.                                 XR lumbar spine 6/7/23  FINDINGS:  Vertebral body height is well maintained.  There is no evidence of fracture.  Vertebral alignment is normal.  There is moderate to severe degenerative disc disease and facet arthropathy mainly at the L3-S1 levels.        IMPRESSION:     1.  No compression deformity or acute fracture is identified.     2.  There is significant degenerative disc disease and facet arthropathy throughout the lower lumbar spine.             XR hips 6/7/23  FINDINGS:  There is no evidence of acute fracture involving the pelvis. No proximal femoral fracture is identified.  There is moderate joint space narrowing. A core sclerosis and marginal spurring. There is mild flattening of the right femoral head. There is some linear  lucency within the right femoral head.     IMPRESSION:     1.  No radiographic evidence of acute traumatic injury.     2.  Findings are consistent with moderate osteoarthritis bilaterally.     3.  There is some flattening of the right femoral head as well as some lucency in the mid femoral head area. Avascular necrosis is a possibility. Prior right femoral head fracture is also possible.           DIAGNOSIS   Visit Diagnoses     ICD-10-CM   1. Lumbar spondylosis  M47.816   2. Knee instability, left  M25.362   3. Chronic pain of left knee  M25.562    G89.29   4. Chronic bilateral low back pain without sciatica  M54.50    G89.29   5. Chronic pain of both hips  M25.551    M25.552    G89.29   6. Neuropathy  G62.9   7. Gluteal pain  M79.18   8. Myalgia  M79.10   9. Tendinopathy of right gluteus medius  M67.951   10. Acute pain of left knee  M25.562                 ASSESSMENT and PLAN:     Sukumar Tyson  1955 male with ongoing low back pain and new acute left knee pain    Exam today notable for tenderness to palpation at bilateral lower lumbar facet joints with exam notable for positive lumbar facet loading maneuver bilaterally reproducing the patient's pain, suggestive of lumbar facetogenic pain.  Imaging shows lumbar spondylosis at bilateral lower lumbar levels.    Darrius was seen today for follow-up.    Diagnoses and all orders for this visit:    Lumbar spondylosis  -     Referral to Pain Clinic    Knee instability, left  -     MR-KNEE-W/O LEFT; Future  -     Referral to Orthopedics    Chronic pain of left knee  -     MR-KNEE-W/O LEFT; Future  -     Referral to Orthopedics    Chronic bilateral low back pain without sciatica    Chronic pain of both hips    Neuropathy    Gluteal pain    Myalgia    Tendinopathy of right gluteus medius    Acute pain of left knee          Physical Therapy: At this time, the patient has had persistent low back pain despite many months of formal physical therapy    Diagnostic  workup:Limited diagnostic ultrasound 2/11/2025 by Dr. Herron of the patient's left knee.  Order MRI left knee today.  Concern for meniscus tear     Personally reviewed at today's visit: Fluoroscopic images from 2/6/25 indicating successful diagnostic lumbar medial branch blocks targeting the bilateral L4-L5 and L5-S1 facet joints    Medications:   -Continue tizanidine and mobic PRN which he has taken with some relief.    Interventions:    -S/p trigger point injections most recently with limited relief  - discussed radiofrequency ablation of lumbar medial branch nerves targeting Bilateral L4-L5 and L5-S1 facet joints given over 80% relief of index pain after diagnostic lumbar medial branch blocks targeting the bilateral L4-L5 and L5-S1 facet joints #1 and #2.  Discussed that the patient will need a  regardless of whether or not he chooses to have sedation.  The risks, benefits, and alternatives to this procedure were discussed and the patient wishes to proceed with the procedure. Risks include but are not limited to damage to surrounding structures, infection, bleeding, worsening of pain which can be permanent, and weakness which can be permanent. Benefits include pain relief and improved function. Alternatives include not doing the procedure.    -Discussed possible epidural if his sciatica pain returns and is severe enough to warrant an injection    Other  -Referral to orthopedic sports medicine today   - Discussed possible imaging of left knee if his pain persists.  Currently his left knee pain is improving and not significantly limiting him    Follow up: 3- 4 weeks after lumbar radiofrequency ablation    Haley Herron MD  Interventional Pain and Spine  Physical Medicine and Rehabilitation  [facility]

## 2025-02-11 NOTE — H&P (VIEW-ONLY)
Verbal consent was acquired by the patient to use [employer] ambient listening note generation during this visit Yes     Follow up patient note  Interventional spine and Pain  Physiatry (physical medicine and  Rehabilitation)       Chief complaint:   Chief Complaint   Patient presents with    Follow-Up     Post SP          HISTORY (2/17/2023):  Sukumar Tyson is a 67 y.o. male who presents today with a dull constant discomfort in his upper thighs for a couple of months and left big toe for several years. This doesn't limit him from doing activities. He is still able to ski without difficulty or exacerbation of pain. He denies noticeable numbness/tingling or weakness in his legs. He notes a history of right sciatica but denies current symptoms of this.  He was referred for an EMG by his PCP. He remembers getting an EMG at least 20 years ago in [place] which was normal. Notes that his A1c has typically been in the 6% range but his most recent A1c was 7.4%. This is the highest it's been.      Pain right now is 2/10 on the numeric pain scale. His pain at its best-worse level during the course of the day is typically 2-3/10, respectively.  Pain worsens with sitting and side bending or twisting and improves with standing, walking, bending forward, and bending backwards. His pain does not interfere with ADLs. The patient denies weakness, numbness, or bladder/bowel incontinence.     Notes that he has been limited by low back pain and hip pain in the past.  An x-ray in 2017 showed signs of very mild hip OA.  He remembers seeing a doctor who mentioned that his pain could be related to OA.  He has not done dedicated physical therapy for this in the past.     The patient has not done physical therapy for this problem. Has done PT for his back and still does home exercise program      Patient has tried the following medications with varied success (current meds in bold):   Oral naproxen PRN prior to physical activity  primarily for back and hip pain     Medical history includes T1DM, HLD, HTN.    HPI  Patient identification: Sukumar Tyson ,  1955,   With Diagnoses of Lumbar spondylosis, Knee instability, left, Chronic pain of left knee, Chronic bilateral low back pain without sciatica, Chronic pain of both hips, Neuropathy, Gluteal pain, Myalgia, Tendinopathy of right gluteus medius, and Acute pain of left knee were pertinent to this visit.     Presents today after  25 diagnostic lumbar medial branch blocks targeting the bilateral L4-L5 and L5-S1 facet joints with over 80% treatment of index pain.  He reports that his low back pain has returned and he would like to proceed with radiofrequency ablation.    He reports that yesterday he had an incident while skiing where he was getting off the lift and somebody stepped on his knee, he fell forward onto his knees and immediately had severe left knee pain afterwards.  The pain is primarily located at his medial left knee.  He reports a sensation of instability but his left knee has not given out.  He has not gone skiing or golfing since then.  He denies history of knee pain.      Procedure history:  -5/15/2023 trigger point injections with Dr. Watters  -significant relief.   -24 trigger point injections -1 day of relief  -2025 diagnostic lumbar medial branch blocks targeting the bilateral L4-L5 and L5-S1 facet joints-at least 80% improvement in index pain  -25 diagnostic lumbar medial branch blocks targeting the bilateral L4-L5 and L5-S1 facet joints-at least 80% improvement in index pain    ROS Red Flags :   Fever, Chills, Sweats: Denies  Involuntary Weight Loss: Denies  Bowel/Bladder Incontinence: Denies  Saddle Anesthesia: Denies        PMHx:   Past Medical History:   Diagnosis Date    Arrhythmia 10/04/2024    History of SVT 20 years ago.    Diabetes (HCC)     GERD (gastroesophageal reflux disease)     Glaucoma     High cholesterol     HTN  (hypertension)     Neuropathy        PSHx:   Past Surgical History:   Procedure Laterality Date    LUMBAR MEDIAL BRANCH BLOCKS Bilateral 2/6/2025    Procedure: Diagnostic medial branch blocks targeting the BILATERAL L4-5 and L5-S1 facet joints with fluoroscopic guidance #2;  Surgeon: Haley Herron M.D.;  [facility];  Service: Pain Management    LUMBAR MEDIAL BRANCH BLOCKS  1/22/2025    Procedure: Diagnostic medial branch blocks targeting the BILATERAL L4-5 and L5-S1 facet joints with fluoroscopic guidance #1;  Surgeon: Haley Herron M.D.;  Location: [facility];  Service: Pain Management    CT LARYNGOSCOPY,DIRCT,OP SCOP,EXC TUMR Right 10/10/2024    Procedure: RIGHT MICRO LARYNGOSCOPY WITH TRUE VOCAL CORD STRIPPING, LASER;  Surgeon: Roni Vidales M.D.;  Location: SURGERY SAME DAY [facility];  Service: Ent    OTHER CARDIAC SURGERY  10/04/2024    Ablation 20 years ago for SVT    TONSILLECTOMY      as a child       Family history   Family History   Problem Relation Age of Onset    No Known Problems Mother     No Known Problems Father          Medications:   Outpatient Medications Marked as Taking for the 2/11/25 encounter (Office Visit) with Haley Herron M.D.   Medication Sig Dispense Refill    olmesartan (BENICAR) 20 MG Tab Take 1 Tablet by mouth every day. 100 Tablet 3    naproxen (NAPROSYN) 250 MG Tab Take 250 mg by mouth 2 times a day with meals.      ibuprofen (MOTRIN) 200 MG Tab Take 200 mg by mouth every 6 hours as needed.      insulin lispro 100 UNIT/ML SC SOPN injection PEN Inject 10 Units under the skin 3 times a day before meals. **MEDICATION INSTRUCTIONS FOR SURGERY/PROCEDURE SCHEDULED FOR 10/10/2024 COORDINATE MEDICATION INSTRUCTIONS FROM PRESCRIBING PHYSICIAN 10 Each 3    atorvastatin (LIPITOR) 10 MG Tab Take 1 Tablet by mouth every day. 90 Tablet 3    loratadine (CLARITIN) 10 MG Tab Take 10 mg by mouth every day.        **MEDICATION INSTRUCTIONS FOR  SURGERY/PROCEDURE SCHEDULED FOR 10/10/2024   OK TO CONTINUE TAKING PRIOR TO SURGERY AND DAY OF SURGERY      COLLAGEN PO Take  by mouth every day.          **MEDICATION INSTRUCTIONS FOR SURGERY/PROCEDURE SCHEDULED FOR 10/10/2024   DO NOT TAKE 7 DAYS PRIOR TO SURGERY      omeprazole (PRILOSEC) 40 MG delayed-release capsule Take 1 Capsule by mouth every day. (Patient taking differently: Take 40 mg by mouth every day.        **MEDICATION INSTRUCTIONS FOR SURGERY/PROCEDURE SCHEDULED FOR 10/10/2024.   OK TO CONTINUE TAKING PRIOR TO SURGERY AND DAY OF SURGERY) 90 Capsule 3    latanoprost (XALATAN) 0.005 % Solution        **MEDICATION INSTRUCTIONS FOR SURGERY/PROCEDURE SCHEDULED FOR 10/10/2024   OK TO CONTINUE TAKING PRIOR TO SURGERY AND NIGHT BEFORE SURGERY      tizanidine (ZANAFLEX) 4 MG Tab Take 0.5-1 Tablets by mouth at bedtime as needed (muscle spasms). 30 Tablet 2    Continuous Blood Gluc Sensor (FREESTYLE DON 14 DAY SENSOR) Misc 1 Each every 14 days. 6 Each 3    amLODIPine (NORVASC) 5 MG Tab Take 1 Tablet by mouth every day. (Patient taking differently: Take 5 mg by mouth every day.          **MEDICATION INSTRUCTIONS FOR SURGERY/PROCEDURE SCHEDULED FOR 10/10/2024.   OK TO CONTINUE TAKING PRIOR TO SURGERY AND DAY OF SURGERY) 90 Tablet 3    Insulin Degludec (TRESIBA FLEXTOUCH) 100 UNIT/ML Solution Pen-injector INJECT 50 SUBCUTANEOUSLY ONCE DAILY (Patient taking differently: 10 Units. INJECT 50 SUBCUTANEOUSLY ONCE DAILY            **MEDICATION INSTRUCTIONS FOR SURGERY/PROCEDURE SCHEDULED FOR 10/10/2024   COORDINATE MEDICATION INSTRUCTIONS FROM PRESCRIBING PHYSICIAN) 15 mL 11    Glucagon (GVOKE HYPOPEN 1-PACK) 1 MG/0.2ML Solution Auto-injector Inject 1 mg under the skin one time as needed (hypoglycemia) for up to 1 dose. (Patient taking differently: Inject 1 mg under the skin one time as needed (hypoglycemia).          **MEDICATION INSTRUCTIONS FOR SURGERY/PROCEDURE SCHEDULED FOR 10/10/2024   OK TO CONTINUE TAKING AS  PRESCRIBED) 0.2 mL 1    tadalafil (CIALIS) 20 MG tablet Take 1 Tablet by mouth as needed for Erectile Dysfunction. (Patient taking differently: Take 20 mg by mouth as needed for Erectile Dysfunction.          **MEDICATION INSTRUCTIONS FOR SURGERY/PROCEDURE SCHEDULED FOR 10/10/2024   DO NOT TAKE FOR 48 HOURS PRIOR TO SURGERY) 30 Tablet 3    Insulin Pen Needle (PEN NEEDLES) 32G X 5 MM Misc Use to inject insulin 4x daily subq 100 Each 3    Continuous Blood Gluc  (FREESTYLE DON 2 READER) Device Follow box instructions 1 Each 3    Multiple Vitamin (DAILY-JEANNA) Tab Take 1 Tablet by mouth every day.          **MEDICATION INSTRUCTIONS FOR SURGERY/PROCEDURE SCHEDULED FOR 10/10/2024   DO NOT TAKE 7 DAYS PRIOR TO SURGERY      glucosamine Sulfate 500 MG Cap Take 500 mg by mouth 3 times a day with meals. Once a day 1500 mg       **MEDICATION INSTRUCTIONS FOR SURGERY/PROCEDURE SCHEDULED FOR 10/10/2024   DO NOT TAKE 7 DAYS PRIOR TO SURGERY      aspirin (ASA) 81 MG Chew Tab chewable tablet Chew 81 mg every day.          **MEDICATION INSTRUCTIONS FOR SURGERY/PROCEDURE SCHEDULED FOR 10/10/2024   COORDINATE MEDICATION INSTRUCTIONS FROM PRESCRIBING PHYSICIAN.  PATIENT STATES LAST TAKEN ON 10/2/2024          Current Outpatient Medications on File Prior to Visit   Medication Sig Dispense Refill    olmesartan (BENICAR) 20 MG Tab Take 1 Tablet by mouth every day. 100 Tablet 3    naproxen (NAPROSYN) 250 MG Tab Take 250 mg by mouth 2 times a day with meals.      ibuprofen (MOTRIN) 200 MG Tab Take 200 mg by mouth every 6 hours as needed.      insulin lispro 100 UNIT/ML SC SOPN injection PEN Inject 10 Units under the skin 3 times a day before meals. **MEDICATION INSTRUCTIONS FOR SURGERY/PROCEDURE SCHEDULED FOR 10/10/2024 COORDINATE MEDICATION INSTRUCTIONS FROM PRESCRIBING PHYSICIAN 10 Each 3    atorvastatin (LIPITOR) 10 MG Tab Take 1 Tablet by mouth every day. 90 Tablet 3    loratadine (CLARITIN) 10 MG Tab Take 10 mg by mouth every  day.        **MEDICATION INSTRUCTIONS FOR SURGERY/PROCEDURE SCHEDULED FOR 10/10/2024   OK TO CONTINUE TAKING PRIOR TO SURGERY AND DAY OF SURGERY      COLLAGEN PO Take  by mouth every day.          **MEDICATION INSTRUCTIONS FOR SURGERY/PROCEDURE SCHEDULED FOR 10/10/2024   DO NOT TAKE 7 DAYS PRIOR TO SURGERY      omeprazole (PRILOSEC) 40 MG delayed-release capsule Take 1 Capsule by mouth every day. (Patient taking differently: Take 40 mg by mouth every day.        **MEDICATION INSTRUCTIONS FOR SURGERY/PROCEDURE SCHEDULED FOR 10/10/2024.   OK TO CONTINUE TAKING PRIOR TO SURGERY AND DAY OF SURGERY) 90 Capsule 3    latanoprost (XALATAN) 0.005 % Solution        **MEDICATION INSTRUCTIONS FOR SURGERY/PROCEDURE SCHEDULED FOR 10/10/2024   OK TO CONTINUE TAKING PRIOR TO SURGERY AND NIGHT BEFORE SURGERY      tizanidine (ZANAFLEX) 4 MG Tab Take 0.5-1 Tablets by mouth at bedtime as needed (muscle spasms). 30 Tablet 2    Continuous Blood Gluc Sensor (FREESTYLE DON 14 DAY SENSOR) Misc 1 Each every 14 days. 6 Each 3    amLODIPine (NORVASC) 5 MG Tab Take 1 Tablet by mouth every day. (Patient taking differently: Take 5 mg by mouth every day.          **MEDICATION INSTRUCTIONS FOR SURGERY/PROCEDURE SCHEDULED FOR 10/10/2024.   OK TO CONTINUE TAKING PRIOR TO SURGERY AND DAY OF SURGERY) 90 Tablet 3    Insulin Degludec (TRESIBA FLEXTOUCH) 100 UNIT/ML Solution Pen-injector INJECT 50 SUBCUTANEOUSLY ONCE DAILY (Patient taking differently: 10 Units. INJECT 50 SUBCUTANEOUSLY ONCE DAILY            **MEDICATION INSTRUCTIONS FOR SURGERY/PROCEDURE SCHEDULED FOR 10/10/2024   COORDINATE MEDICATION INSTRUCTIONS FROM PRESCRIBING PHYSICIAN) 15 mL 11    Glucagon (GVOKE HYPOPEN 1-PACK) 1 MG/0.2ML Solution Auto-injector Inject 1 mg under the skin one time as needed (hypoglycemia) for up to 1 dose. (Patient taking differently: Inject 1 mg under the skin one time as needed (hypoglycemia).          **MEDICATION INSTRUCTIONS FOR SURGERY/PROCEDURE SCHEDULED  FOR 10/10/2024   OK TO CONTINUE TAKING AS PRESCRIBED) 0.2 mL 1    tadalafil (CIALIS) 20 MG tablet Take 1 Tablet by mouth as needed for Erectile Dysfunction. (Patient taking differently: Take 20 mg by mouth as needed for Erectile Dysfunction.          **MEDICATION INSTRUCTIONS FOR SURGERY/PROCEDURE SCHEDULED FOR 10/10/2024   DO NOT TAKE FOR 48 HOURS PRIOR TO SURGERY) 30 Tablet 3    Insulin Pen Needle (PEN NEEDLES) 32G X 5 MM Misc Use to inject insulin 4x daily subq 100 Each 3    Continuous Blood Gluc  (FREESTYLE DON 2 READER) Device Follow box instructions 1 Each 3    Multiple Vitamin (DAILY-JEANNA) Tab Take 1 Tablet by mouth every day.          **MEDICATION INSTRUCTIONS FOR SURGERY/PROCEDURE SCHEDULED FOR 10/10/2024   DO NOT TAKE 7 DAYS PRIOR TO SURGERY      glucosamine Sulfate 500 MG Cap Take 500 mg by mouth 3 times a day with meals. Once a day 1500 mg       **MEDICATION INSTRUCTIONS FOR SURGERY/PROCEDURE SCHEDULED FOR 10/10/2024   DO NOT TAKE 7 DAYS PRIOR TO SURGERY      aspirin (ASA) 81 MG Chew Tab chewable tablet Chew 81 mg every day.          **MEDICATION INSTRUCTIONS FOR SURGERY/PROCEDURE SCHEDULED FOR 10/10/2024   COORDINATE MEDICATION INSTRUCTIONS FROM PRESCRIBING PHYSICIAN.  PATIENT STATES LAST TAKEN ON 10/2/2024       No current facility-administered medications on file prior to visit.         Allergies:   No Known Allergies    Social Hx:   Social History     Socioeconomic History    Marital status:      Spouse name: Not on file    Number of children: Not on file    Years of education: Not on file    Highest education level: Bachelor's degree (e.g., BA, AB, BS)   Occupational History    Not on file   Tobacco Use    Smoking status: Never    Smokeless tobacco: Never   Vaping Use    Vaping status: Never Used   Substance and Sexual Activity    Alcohol use: Yes     Alcohol/week: 1.2 oz     Types: 2 Cans of beer per week     Comment: Occ. beer or wine    Drug use: Never    Sexual activity: Yes      Partners: Female   Other Topics Concern    Not on file   Social History Narrative    Not on file     Social Drivers of Health     Financial Resource Strain: Low Risk  (12/9/2023)    Overall Financial Resource Strain (CARDIA)     Difficulty of Paying Living Expenses: Not hard at all   Food Insecurity: No Food Insecurity (12/9/2023)    Hunger Vital Sign     Worried About Running Out of Food in the Last Year: Never true     Ran Out of Food in the Last Year: Never true   Transportation Needs: No Transportation Needs (12/9/2023)    PRAPARE - Transportation     Lack of Transportation (Medical): No     Lack of Transportation (Non-Medical): No   Physical Activity: Sufficiently Active (12/9/2023)    Exercise Vital Sign     Days of Exercise per Week: 7 days     Minutes of Exercise per Session: 50 min   Stress: No Stress Concern Present (12/9/2023)    [employer] - Occupational Stress Questionnaire     Feeling of Stress : Not at all   Social Connections: Socially Integrated (12/9/2023)    Social Connection and Isolation Panel [NHANES]     Frequency of Communication with Friends and Family: More than three times a week     Frequency of Social Gatherings with Friends and Family: More than three times a week     Attends [employer] Services: 1 to 4 times per year     Active Member of Clubs or Organizations: Yes     Attends Club or Organization Meetings: More than 4 times per year     Marital Status:    Intimate Partner Violence: Not on file   Housing Stability: Low Risk  (12/9/2023)    Housing Stability Vital Sign     Unable to Pay for Housing in the Last Year: No     Number of Places Lived in the Last Year: 1     Unstable Housing in the Last Year: No         EXAMINATION     Physical Exam:   Vitals: BP (!) 147/73 (BP Location: Right arm, Patient Position: Sitting, BP Cuff Size: Adult)   Pulse (!) 53   Temp 36.4 °C (97.5 °F) (Temporal)   Ht 1.829 m (6')   Wt 77 kg (169 lb 12.1 oz)   SpO2 98%      Constitutional:   Body Habitus: Body mass index is 23.02 kg/m².  Cooperation: Fully cooperates with exam  Appearance: Well-groomed, well-nourished.     Eyes: No scleral icterus to suggest severe liver disease, no proptosis to suggest severe hyperthyroidism     ENT -no obvious auditory deficits, no noticeable facial droop      Skin -no rashes or lesions noted      Respiratory-  breathing comfortably on room air, no audible wheezing     Cardiovascular-distal extremities warm and well perfused.  No lower extremity edema is noted.      Gastrointestinal - no obvious abdominal masses, non-distended     Psychiatric- alert and oriented ×3. Normal affect.      Gait - normal gait, no use of ambulatory device, nonantalgic.      Musculoskeletal and Neuro -      Left knee anterior drawer, Lachman's, varus and valgus stress test negative.  Tyrone's positive for reproducing medial knee pain    Tenderness to palpation at medial left knee.    Thoracic/Lumbar Spine/Sacral Spine/Hips   Inspection: No evidence of atrophy in bilateral lower extremities throughout      Palpation:   Tenderness to palpation over the bilateral lumbar facets      Facet loading maneuver positive bilaterally        Lumbar spine /hip provocative exam maneuvers  Straight leg raise negative bilaterally  FADIR test negative bilaterally     SI joint tests  GRACIE test negative bilaterally        Key points for the international standards for neurological classification of spinal cord injury (ISNCSCI) to light touch.   Dermatome R L   L2 2 2   L3 2 2   L4 2 2   L5 2 2   S1 2 2   S2 2 2         Motor Exam Lower Extremities  ? Myotome R L   Hip flexion L2 5 5   Knee extension L3 5 5   Ankle dorsiflexion L4 5 5   Toe extension L5 5 5   Ankle plantarflexion S1 5 5      Previous exam  Reflexes  ?   R L   Patella   1+ 1+   Achilles    1+ 1+      Clonus of the ankle negative bilaterally                MEDICAL DECISION MAKING    DATA    Labs:   Lab Results   Component  "Value Date/Time    SODIUM 142 10/04/2024 11:21 AM    POTASSIUM 4.2 10/04/2024 11:21 AM    CHLORIDE 107 10/04/2024 11:21 AM    CO2 26 10/04/2024 11:21 AM    GLUCOSE 114 (H) 10/04/2024 11:21 AM    BUN 23 (H) 10/04/2024 11:21 AM    CREATININE 0.92 10/04/2024 11:21 AM        No results found for: \"PROTHROMBTM\", \"INR\"     Lab Results   Component Value Date/Time    WBC 6.7 02/29/2024 07:34 AM    RBC 4.95 02/29/2024 07:34 AM    HEMOGLOBIN 15.4 02/29/2024 07:34 AM    HEMATOCRIT 45.9 02/29/2024 07:34 AM    MCV 92.7 02/29/2024 07:34 AM    MCH 31.1 02/29/2024 07:34 AM    MCHC 33.6 02/29/2024 07:34 AM    MPV 11.1 02/29/2024 07:34 AM    NEUTSPOLYS 45.30 02/29/2024 07:34 AM    LYMPHOCYTES 40.50 02/29/2024 07:34 AM    MONOCYTES 9.50 02/29/2024 07:34 AM    EOSINOPHILS 4.20 02/29/2024 07:34 AM    BASOPHILS 0.40 02/29/2024 07:34 AM        Lab Results   Component Value Date/Time    HBA1C 6.8 (H) 12/20/2024 08:38 AM        Limited diagnostic ultrasound 2/11/2025 by Dr. Herron of the patient's left knee  Moderate joint effusion seen at suprapatellar recess. No obvious derangement of visualized meniscus at medial and lateral joint lines.  Images uploaded for permanent storage.    EMG 3/24/2023  Impression:  This is a normal electrodiagnostic study.  There is no electrodiagnostic evidence of a generalized peripheral polyneuropathy, right median or ulnar mononeuropathy, sural mononeuropathy on either side, tibial mononeuropathy on either side, fibular mononeuropathy on either side, or lumbar plexopathy on either side.        Imaging:   I personally reviewed following images  XR lumbar spine 6/7/23  Facet arthropathy of the bilateral lower lumbar levels.  Anterior body osteophyte at L4 and L5.  Disc space narrowing most notable at L3-4, L4-5, and L5-S1. See formal radiology report for further details.    XR hips 6/7/23  Moderate OA bilaterally. See formal radiology report for further details.     MRI lumbar spine 12/14/2024  At L1-2 there is " mild right moderate left neuroforaminal stenosis.  At L2-3 there is mild to moderate central canal stenosis and bilateral neuroforaminal stenosis.  At L3-4 there is moderate central canal stenosis and moderate to severe bilateral neuroforaminal stenosis.  At L4-5 there is severe right and moderate left neuroforaminal stenosis and mild central canal stenosis.  At L5-S1 there is mild central canal stenosis and moderate to severe bilateral neuroforaminal stenosis.  Facet arthropathy at bilateral lower lumbar levels.  See formal radiology report for further details.    I reviewed the following radiology reports   MRI lumbar spine 12/14/2024  FINDINGS: The lowest formed intervertebral disc will be designated L5-S1 for the purposes of this report and vertebral levels numbered accordingly.        The lumbar vertebral bodies have unremarkable height and no gross malalignment.  No acute or suspicious osseous lesion is seen.  There is loss of intervertebral disc height throughout the lumbar spine, mild at L1-L2 and moderate at the other lumbar levels.  There are prominent Modic type I/II degenerative endplate changes adjacent to the L2-L3 intervertebral disc with Modic type II degenerative endplate changes at L3-L4, L4-L5 and L5-S1.  There are small Schmorl nodes in the inferior endplate of L2, both endplates of L3 and the superior endplate of L4.  The conus medullaris has a normal caliber course and signal intensity.     Level specific findings as follows:     L1-2: Mild diffuse disc bulge. Mild RIGHT and moderate LEFT neural foraminal narrowing.  L2-3: Diffuse disc bulge. Mild to moderate BILATERAL facet arthropathy. Mild to moderate central canal narrowing and BILATERAL neural foraminal narrowing.  L3-4: Diffuse disc bulge. Moderate BILATERAL facet arthropathy. Mild to moderate central canal narrowing. Moderate to severe BILATERAL neural foraminal narrowing.  L4-5: Diffuse disc bulge. Severe RIGHT and moderate to severe  LEFT facet arthropathy. Severe RIGHT and moderate LEFT neural foraminal narrowing. Mild central canal narrowing.  L5-S1: Diffuse disc bulge which approximates and probably contacts the traversing BILATERAL S1 nerve roots. Moderate BILATERAL facet arthropathy. Mild central canal narrowing. Moderate to severe BILATERAL neural foraminal narrowing.     There are changes of BILATERAL sacroiliac arthropathy.     Soft tissues: No abdominal aortic aneurysm or soft tissue mass is seen.     IMPRESSION:     1.  Multilevel multifactorial degenerative changes  2.  No areas of high-grade central canal narrowing  3.  Areas of central canal and neural foraminal narrowing as described above  4.  Disc disease at L5-S1 probably contacts the BILATERAL traversing S1 nerve roots.    X-ray hip 6/22/2017  AP pelvis and left lateral hip films were ordered performed and   interpreted by us today and reveal moderate heart osteoarthritic changes   in both hips with femoral head flattening noticed bilaterally with the   right mi reviewed left and sclerotic changes at the superior acetabular   dome bilaterally and early spur formation superolaterally consistent with   osteoarthritis.                                 XR lumbar spine 6/7/23  FINDINGS:  Vertebral body height is well maintained.  There is no evidence of fracture.  Vertebral alignment is normal.  There is moderate to severe degenerative disc disease and facet arthropathy mainly at the L3-S1 levels.        IMPRESSION:     1.  No compression deformity or acute fracture is identified.     2.  There is significant degenerative disc disease and facet arthropathy throughout the lower lumbar spine.             XR hips 6/7/23  FINDINGS:  There is no evidence of acute fracture involving the pelvis. No proximal femoral fracture is identified.  There is moderate joint space narrowing. A core sclerosis and marginal spurring. There is mild flattening of the right femoral head. There is some linear  lucency within the right femoral head.     IMPRESSION:     1.  No radiographic evidence of acute traumatic injury.     2.  Findings are consistent with moderate osteoarthritis bilaterally.     3.  There is some flattening of the right femoral head as well as some lucency in the mid femoral head area. Avascular necrosis is a possibility. Prior right femoral head fracture is also possible.           DIAGNOSIS   Visit Diagnoses     ICD-10-CM   1. Lumbar spondylosis  M47.816   2. Knee instability, left  M25.362   3. Chronic pain of left knee  M25.562    G89.29   4. Chronic bilateral low back pain without sciatica  M54.50    G89.29   5. Chronic pain of both hips  M25.551    M25.552    G89.29   6. Neuropathy  G62.9   7. Gluteal pain  M79.18   8. Myalgia  M79.10   9. Tendinopathy of right gluteus medius  M67.951   10. Acute pain of left knee  M25.562                 ASSESSMENT and PLAN:     Sukumar Tyson  1955 male with ongoing low back pain and new acute left knee pain    Exam today notable for tenderness to palpation at bilateral lower lumbar facet joints with exam notable for positive lumbar facet loading maneuver bilaterally reproducing the patient's pain, suggestive of lumbar facetogenic pain.  Imaging shows lumbar spondylosis at bilateral lower lumbar levels.    Darrius was seen today for follow-up.    Diagnoses and all orders for this visit:    Lumbar spondylosis  -     Referral to Pain Clinic    Knee instability, left  -     MR-KNEE-W/O LEFT; Future  -     Referral to Orthopedics    Chronic pain of left knee  -     MR-KNEE-W/O LEFT; Future  -     Referral to Orthopedics    Chronic bilateral low back pain without sciatica    Chronic pain of both hips    Neuropathy    Gluteal pain    Myalgia    Tendinopathy of right gluteus medius    Acute pain of left knee          Physical Therapy: At this time, the patient has had persistent low back pain despite many months of formal physical therapy    Diagnostic  workup:Limited diagnostic ultrasound 2/11/2025 by Dr. Herron of the patient's left knee.  Order MRI left knee today.  Concern for meniscus tear     Personally reviewed at today's visit: Fluoroscopic images from 2/6/25 indicating successful diagnostic lumbar medial branch blocks targeting the bilateral L4-L5 and L5-S1 facet joints    Medications:   -Continue tizanidine and mobic PRN which he has taken with some relief.    Interventions:    -S/p trigger point injections most recently with limited relief  - discussed radiofrequency ablation of lumbar medial branch nerves targeting Bilateral L4-L5 and L5-S1 facet joints given over 80% relief of index pain after diagnostic lumbar medial branch blocks targeting the bilateral L4-L5 and L5-S1 facet joints #1 and #2.  Discussed that the patient will need a  regardless of whether or not he chooses to have sedation.  The risks, benefits, and alternatives to this procedure were discussed and the patient wishes to proceed with the procedure. Risks include but are not limited to damage to surrounding structures, infection, bleeding, worsening of pain which can be permanent, and weakness which can be permanent. Benefits include pain relief and improved function. Alternatives include not doing the procedure.    -Discussed possible epidural if his sciatica pain returns and is severe enough to warrant an injection    Other  -Referral to orthopedic sports medicine today   - Discussed possible imaging of left knee if his pain persists.  Currently his left knee pain is improving and not significantly limiting him    Follow up: 3- 4 weeks after lumbar radiofrequency ablation    Haley Herron MD  Interventional Pain and Spine  Physical Medicine and Rehabilitation  [facility]

## 2025-02-12 NOTE — Clinical Note
REFERRAL APPROVAL NOTICE         Sent on February 12, 2025                   Darrius Tysno  [address] [facility]  [place] [address]                   Dear Mr. Tyson,    After a careful review of the medical information and benefit coverage, [facility] has processed your referral. See below for additional details.    If applicable, you must be actively enrolled with your insurance for coverage of the authorized service. If you have any questions regarding your coverage, please contact your insurance directly.    REFERRAL INFORMATION   Referral #:  [id number]  Referred-To Department    Referred-By Provider:  Physical Medicine and Rehab    Haley Herron M.D.   Pain Management       [address]  [address]  [place] [id number] [phone] [address] [place] [address]  [place] [address]  [phone]    Referral Start Date:  02/11/2025  Referral End Date:   02/11/2026             SCHEDULING  If you do not already have an appointment, please call [phone] to make an appointment.     MORE INFORMATION  If you do not already have a [employer] account, sign up at: [email or web address]  You can access your medical information, make appointments, see lab results, billing information, and more.  If you have questions regarding this referral, please contact  the [facility] Referrals department at:             [phone]. Monday - Friday 8:00AM - 5:00PM.     Sincerely,    [facility]

## 2025-02-12 NOTE — Clinical Note
REFERRAL APPROVAL NOTICE         Sent on February 12, 2025                   Darrius Tyson  [address] [facility]  [email or web address] [address]                   Dear Mr. Tyson,    After a careful review of the medical information and benefit coverage, [facility] has processed your referral. See below for additional details.    If applicable, you must be actively enrolled with your insurance for coverage of the authorized service. If you have any questions regarding your coverage, please contact your insurance directly.    REFERRAL INFORMATION   Referral #:  [id number]  Referred-To Department    Referred-By Provider:  Orthopedics    Haley Herron M.D.   [facility]      [address]  [address]  [place] [address]  [phone] [address] [place] [address]  [place] [address]  [phone]    Referral Start Date:  02/11/2025  Referral End Date:   02/11/2026             SCHEDULING  If you do not already have an appointment, please call [phone] to make an appointment.     MORE INFORMATION  If you do not already have a [employer] account, sign up at: [email or web address]  You can access your medical information, make appointments, see lab results, billing information, and more.  If you have questions regarding this referral, please contact  the [facility] Referrals department at:             [phone]. Monday - Friday 8:00AM - 5:00PM.     Sincerely,    [facility]

## 2025-02-15 DIAGNOSIS — I15.9 SECONDARY HYPERTENSION: ICD-10-CM

## 2025-02-18 RX ORDER — AMLODIPINE BESYLATE 5 MG/1
5 TABLET ORAL DAILY
Qty: 90 TABLET | Refills: 0 | Status: SHIPPED | OUTPATIENT
Start: 2025-02-18

## 2025-02-20 ENCOUNTER — APPOINTMENT (OUTPATIENT)
Dept: RADIOLOGY | Facility: REHABILITATION | Age: 70
End: 2025-02-20
Attending: STUDENT IN AN ORGANIZED HEALTH CARE EDUCATION/TRAINING PROGRAM
Payer: MEDICARE

## 2025-02-20 ENCOUNTER — HOSPITAL ENCOUNTER (OUTPATIENT)
Facility: REHABILITATION | Age: 70
End: 2025-02-20
Attending: STUDENT IN AN ORGANIZED HEALTH CARE EDUCATION/TRAINING PROGRAM | Admitting: STUDENT IN AN ORGANIZED HEALTH CARE EDUCATION/TRAINING PROGRAM
Payer: MEDICARE

## 2025-02-20 VITALS
HEIGHT: 72 IN | HEART RATE: 5 BPM | WEIGHT: 169.53 LBS | TEMPERATURE: 97.5 F | OXYGEN SATURATION: 99 % | RESPIRATION RATE: 16 BRPM | DIASTOLIC BLOOD PRESSURE: 93 MMHG | SYSTOLIC BLOOD PRESSURE: 160 MMHG | BODY MASS INDEX: 22.96 KG/M2

## 2025-02-20 PROCEDURE — 700111 HCHG RX REV CODE 636 W/ 250 OVERRIDE (IP): Mod: JZ

## 2025-02-20 PROCEDURE — 64636 DESTROY L/S FACET JNT ADDL: CPT

## 2025-02-20 PROCEDURE — 64635 DESTROY LUMB/SAC FACET JNT: CPT

## 2025-02-20 RX ORDER — LIDOCAINE HYDROCHLORIDE 10 MG/ML
INJECTION, SOLUTION EPIDURAL; INFILTRATION; INTRACAUDAL; PERINEURAL
Status: COMPLETED
Start: 2025-02-20 | End: 2025-02-20

## 2025-02-20 RX ADMIN — LIDOCAINE HYDROCHLORIDE 30 ML: 10 INJECTION, SOLUTION EPIDURAL; INFILTRATION; INTRACAUDAL; PERINEURAL at 09:10

## 2025-02-20 ASSESSMENT — FIBROSIS 4 INDEX: FIB4 SCORE: 1.47

## 2025-02-20 ASSESSMENT — PAIN DESCRIPTION - PAIN TYPE
TYPE: CHRONIC PAIN
TYPE: CHRONIC PAIN

## 2025-02-20 NOTE — INTERVAL H&P NOTE
Consented Procedure: RIGHT and LEFT  radiofrequency neurotomies medial branch targeting the L4-5 and L5-S1 facet joints with fluoroscopic guidance and sedation…Note: Plan for 80 °C for 90 seconds for each neurotomy  I have examined the patient, provided the risks, benefits, and alternatives to the procedure(s) indicated on the signed consent form, and the patient wishes to proceed.    H&P reviewed. The patient was examined and there are no changes to the H&P      Haley Herron M.D.  02/20/25 8:46 AM

## 2025-02-20 NOTE — OP REPORT
"Patient: Sukumar Tyson 69 y.o. male MRN: [id number]     Date of Service: 2/20/2025     Physician/s: Haley Herron MD    Pre-operative Diagnosis: Lumbar spondylosis, facet arthropathy.      Post-operative Diagnosis: Lumbar spondylosis, facet arthropathy.     Procedure: Medial Branch Radiofrequency neurotomy targeting the right and left L4-L5 and L5-S1 facet joint(s).     Description of procedure:    The patient was not treated for radiculopathy at this time    The risks, benefits, and alternatives of the procedure were reviewed and discussed with the patient.  Written informed consent was freely obtained. A pre-procedural time-out was conducted by the physician verifying patient’s identity, procedure to be performed, procedure site and side, and allergy verification. Appropriate equipment was determined to be in place for the procedure.     No sedation was used for this procedure.    The patient's vital signs were carefully monitored before, throughout, and after the procedure.     In the fluoroscopy suite the patient was placed in a prone position, and a pillow was placed underneath the level of the umbilicus. The skin was prepped and draped in the usual sterile fashion. The fluoroscope was placed over the low back at the appropriate angles, and the targets for needle/probe placement were marked. A 25g needle was placed into each of the markings at three levels, and approx 1cc of 1% Lidocaine was injected subcutaneously into the epidermal and dermal layers. The needle was removed.       A 18 gauge, 10 cm RFN cannula with a 10 mm active tip was then placed into the skin using fluoroscopic guidance and advanced with an oblique view towards the intersection of the transverse process and superior articular process L4-5 facet joint where the L3 medial branch runs on the left side. The needle/probe tips were then verified by AP, oblique, and lateral views.     A 25 gauge 3.5\" needle was placed at the intersection of " "the transverse process and superior articular process L5-S1 facet joint where the L4 medial branch runs on the left side. The the fluoroscopic was tilted caudally and a 18 gauge, 10 cm RFN cannula with a 10 mm active tip was then placed into the skin using fluoroscopic guidance and advanced with an oblique view towards the intersection of the transverse process and superior articular process L5-S1 facet joint where the L4 medial branch runs on the left side. The 25 gauge 3.5\" needle was removed with the 18 gauge, 10 cm RFN cannula with a 10 mm active tip remaining in place. The needle/probe tips were then verified by AP, oblique, and lateral views.     Then A 18 gauge, 10 cm RFN cannula with a 10 mm active tip was then placed into the skin using fluoroscopic guidance and advanced with an oblique view towards the intersection of the transverse process and superior articular process S1 facet where the L5 dorsal ramus runs on the left side. The needle/probe tips were then verified by AP, oblique, and lateral views.     Motor stimulation is used as an extra precaution to ensure the needle tips are off the lumbar nerve roots prior to each lesion. Following negative aspiration, 3cc of 1% Lidocaine was injected at each of the above levels. The needles are not moved, and fluoroscope guidance is used to ensure the needles have not moved. After a wait period of approximately 2 minutes, a radiofrequency lesion was then created at each level with a temperature of 80 degrees centigrade for 90 seconds.     The probes were adjusted to a 2nd location and images were saved in 2+ views. Motor testing was done which confirmed no twitching in the leg and a 2nd radiofrequency lesion was made of 80 °C for 90 seconds.    The cannulas were restyletted, and were then removed intact.       Then attention was turned to the right side.    A 18 gauge, 10 cm RFN cannula with a 10 mm active tip was then placed into the skin using fluoroscopic " guidance and advanced with an oblique view towards the intersection of the transverse process and superior articular process L4-5 facet joint where the L3 medial branch runs on the right side. The needle/probe tips were then verified by AP, oblique, and lateral views.     A 18 gauge, 10 cm RFN cannula with a 10 mm active tip was then placed into the skin using fluoroscopic guidance and advanced with an oblique view towards the intersection of the transverse process and superior articular process L5-S1 facet joint where the L4 medial branch runs on the right side. The needle/probe tips were then verified by AP, oblique, and lateral views.     A 18 gauge, 10 cm RFN cannula with a 10 mm active tip was then placed into the skin using fluoroscopic guidance and advanced with an oblique view towards the intersection of the transverse process and superior articular process S1 facet where the L5 dorsal ramus runs on the right side. The needle/probe tips were then verified by AP, oblique, and lateral views.     Motor stimulation is used as an extra precaution to ensure the needle tips are off the lumbar nerve roots prior to each lesion. Following negative aspiration, 3cc of 1% lidocaine was injected at each of the above locations. The needles are not moved, but fluoroscope guidance is used to ensure the needles have not moved. After a wait period of approximately 2 minutes, a radiofrequency lesion was then created at each level with a temperature of 80 degrees centigrade for 90 seconds.     The probes were adjusted to a 2nd location and images were saved in 2+ views. Motor testing was done which confirmed no twitching in the leg and a 2nd radiofrequency lesion was made with 80°C for 90 seconds.        The cannulas were restyletted, and were then removed intact.     Fluoroscopic images in AP and lateral view were saved prior to each radiofrequency neurotomy.    The patient's back was covered with a 4x4 gauze, the area was  cleansed with sterile normal saline, and a dressing was applied. There were no complications noted, the patient remained hemodynamically stable, and the patient tolerated the procedure well. The patient was examined in the postoperative area and the strength exam was identical as prior to the procedure.    Follow-up as scheduled    Haley Herron MD  Interventional Pain and Spine  Physical Medicine and Rehabilitation  [facility]      Pre-procedure pain score: 6/10 on NRS  Post-procedure pain score: 1/10 on NRS  Greater than 50% relief of the patient's index pain was achieved after the procedure.    CPT  Radiofrequency ablation (RFA) - lumbar or sacral (1st joint):  48747-79  Radiofrequency ablation (RFA) - lumbar or sacral (each additional joint):  67199-80

## 2025-02-21 ENCOUNTER — HOSPITAL ENCOUNTER (OUTPATIENT)
Dept: RADIOLOGY | Facility: MEDICAL CENTER | Age: 70
End: 2025-02-21
Attending: STUDENT IN AN ORGANIZED HEALTH CARE EDUCATION/TRAINING PROGRAM
Payer: MEDICARE

## 2025-02-21 DIAGNOSIS — M25.562 CHRONIC PAIN OF LEFT KNEE: ICD-10-CM

## 2025-02-21 DIAGNOSIS — G89.29 CHRONIC PAIN OF LEFT KNEE: ICD-10-CM

## 2025-02-21 DIAGNOSIS — M25.362 KNEE INSTABILITY, LEFT: ICD-10-CM

## 2025-02-21 PROCEDURE — 73721 MRI JNT OF LWR EXTRE W/O DYE: CPT | Mod: LT

## 2025-03-20 ENCOUNTER — APPOINTMENT (OUTPATIENT)
Dept: PHYSICAL MEDICINE AND REHAB | Facility: MEDICAL CENTER | Age: 70
End: 2025-03-20
Payer: MEDICARE

## 2025-03-20 VITALS
WEIGHT: 169.75 LBS | DIASTOLIC BLOOD PRESSURE: 66 MMHG | HEIGHT: 72 IN | BODY MASS INDEX: 22.99 KG/M2 | OXYGEN SATURATION: 96 % | TEMPERATURE: 98.2 F | SYSTOLIC BLOOD PRESSURE: 136 MMHG | HEART RATE: 52 BPM

## 2025-03-20 DIAGNOSIS — M79.10 MYALGIA: ICD-10-CM

## 2025-03-20 PROCEDURE — 1125F AMNT PAIN NOTED PAIN PRSNT: CPT | Performed by: STUDENT IN AN ORGANIZED HEALTH CARE EDUCATION/TRAINING PROGRAM

## 2025-03-20 PROCEDURE — 99213 OFFICE O/P EST LOW 20 MIN: CPT | Mod: 25 | Performed by: STUDENT IN AN ORGANIZED HEALTH CARE EDUCATION/TRAINING PROGRAM

## 2025-03-20 PROCEDURE — 76942 ECHO GUIDE FOR BIOPSY: CPT | Performed by: STUDENT IN AN ORGANIZED HEALTH CARE EDUCATION/TRAINING PROGRAM

## 2025-03-20 PROCEDURE — 20553 NJX 1/MLT TRIGGER POINTS 3/>: CPT | Performed by: STUDENT IN AN ORGANIZED HEALTH CARE EDUCATION/TRAINING PROGRAM

## 2025-03-20 PROCEDURE — 3078F DIAST BP <80 MM HG: CPT | Performed by: STUDENT IN AN ORGANIZED HEALTH CARE EDUCATION/TRAINING PROGRAM

## 2025-03-20 PROCEDURE — 3075F SYST BP GE 130 - 139MM HG: CPT | Performed by: STUDENT IN AN ORGANIZED HEALTH CARE EDUCATION/TRAINING PROGRAM

## 2025-03-20 RX ORDER — BUPIVACAINE HYDROCHLORIDE 5 MG/ML
5 INJECTION, SOLUTION EPIDURAL; INTRACAUDAL; PERINEURAL ONCE
Status: COMPLETED | OUTPATIENT
Start: 2025-03-20 | End: 2025-03-20

## 2025-03-20 RX ADMIN — BUPIVACAINE HYDROCHLORIDE 5 ML: 5 INJECTION, SOLUTION EPIDURAL; INTRACAUDAL; PERINEURAL at 15:03

## 2025-03-20 RX ADMIN — Medication 5 ML: at 15:04

## 2025-03-20 ASSESSMENT — FIBROSIS 4 INDEX: FIB4 SCORE: 1.47

## 2025-03-20 ASSESSMENT — PATIENT HEALTH QUESTIONNAIRE - PHQ9: CLINICAL INTERPRETATION OF PHQ2 SCORE: 0

## 2025-03-20 ASSESSMENT — PAIN SCALES - GENERAL: PAINLEVEL_OUTOF10: 5=MODERATE PAIN

## 2025-03-20 NOTE — PROCEDURES
Patient Name: Sukumar Tyson  : 1955  Date of Service: 3/20/2025    Physician/s: Haley Herron MD    Pre-operative Diagnosis: Myalgia (M79.1)    Post-operative Diagnosis: Myalgia (M79.1)    Procedure: trigger point injections of the following muscles:    Site R L   Splenius capitis     Semispinalis capitis     Splenius cervicis     Sternocleidomastoid     Upper trapezius     Levator scapulae     Rhomboids     Pectoralis minor     Pectoralis major     Serratus anterior     Supraspinatus     Infraspinatus     Teres minor     Teres major     Quadratus lumborum     Latissimus dorsi          Paravertebral, cervical     Paravertebral, thoracic     Paravertebral, lumbar  x   Gluteus chloe  x   Gluteus medius  x   Gluteus minimus     Tensor fascia colleen     Vastus lateralis     Adductor sarita     Adductor longus     Occipitalis     Cervical paraspinal     Trapezius, mid     Trapezius, lower       Description of procedure:    The risks, benefits, and alternatives of the procedure were reviewed and discussed with the patient.  Written informed consent was freely obtained. A pre-procedural time-out was conducted by the physician verifying patient’s identity, procedure to be performed, procedure site and side, and allergy verification. Appropriate equipment was determined to be in place for the procedure.     In the office suite exam room the patient was placed in a prone position and the skin areas for injection over the above muscles were marked. The areas of pain were then prepped and draped in the usual sterile fashion. A solution was prepared with 5 mL of 1% lidocaine and 5 mL of 0.5% bupivacaine. Ultrasound was confirmed to view the adjacent structures for blood vessels and nerves and to confirm the needle path was not within the structures. A 27g needle was placed into each of the markings at the areas above under ultrasound guidance with an out of plane approach. After negative aspiration, approximately  0.8-1.5 mL of the above solution was injected. The needle was removed intact after each trigger point injection, and the patient's back was covered with a 4x4 gauze, the area was cleansed with sterile normal saline, and a dressing was applied. There were no complications noted. The images were uploaded to our media tab for permanent storage.    Pre-procedure pain score: 5/10 on NRS  Post-procedure pain score: 5/10 on NRS    Ultrasound was used today for the patient's safety and will not be billed for the visit.    Haley Herron MD  Interventional Pain and Spine  Physical Medicine and Rehabilitation  [facility]

## 2025-03-20 NOTE — PROGRESS NOTES
Verbal consent was acquired by the patient to use [employer] ambient listening note generation during this visit Yes     Follow up patient note  Interventional spine and Pain  Physiatry (physical medicine and  Rehabilitation)       Chief complaint:   Chief Complaint   Patient presents with    Follow-Up     Lumbar spondylosis          HISTORY (2/17/2023):  Sukumar Tyson is a 67 y.o. male who presents today with a dull constant discomfort in his upper thighs for a couple of months and left big toe for several years. This doesn't limit him from doing activities. He is still able to ski without difficulty or exacerbation of pain. He denies noticeable numbness/tingling or weakness in his legs. He notes a history of right sciatica but denies current symptoms of this.  He was referred for an EMG by his PCP. He remembers getting an EMG at least 20 years ago in [place] which was normal. Notes that his A1c has typically been in the 6% range but his most recent A1c was 7.4%. This is the highest it's been.      Pain right now is 2/10 on the numeric pain scale. His pain at its best-worse level during the course of the day is typically 2-3/10, respectively.  Pain worsens with sitting and side bending or twisting and improves with standing, walking, bending forward, and bending backwards. His pain does not interfere with ADLs. The patient denies weakness, numbness, or bladder/bowel incontinence.     Notes that he has been limited by low back pain and hip pain in the past.  An x-ray in 2017 showed signs of very mild hip OA.  He remembers seeing a doctor who mentioned that his pain could be related to OA.  He has not done dedicated physical therapy for this in the past.     The patient has not done physical therapy for this problem. Has done PT for his back and still does home exercise program      Patient has tried the following medications with varied success (current meds in bold):   Oral naproxen PRN prior to physical  activity primarily for back and hip pain     Medical history includes T1DM, HLD, HTN.    HPI  Patient identification: Sukumar Tyson ,  1955,   With The encounter diagnosis was Myalgia.     Presents today after 2025 Medial Branch Radiofrequency neurotomy targeting the right and left L4-L5 and L5-S1 facet joint(s).  He reports he does not have pain at his right low back anymore but has newly developed a more constant pain at his left low back.  He recently saw the knee surgeon in regards to his knee left knee pain and was diagnosed with an MCL tear and meniscus tear.  He is planning to manage this conservatively at this time.  He is currently doing physical therapy for this.  He has completed physical therapy for his low back previously.    Procedure history:  -5/15/2023 trigger point injections with Dr. Watters  -significant relief.   -24 trigger point injections -1 day of relief  -2025 diagnostic lumbar medial branch blocks targeting the bilateral L4-L5 and L5-S1 facet joints-at least 80% improvement in index pain  -25 diagnostic lumbar medial branch blocks targeting the bilateral L4-L5 and L5-S1 facet joints-at least 80% improvement in index pain  -2025 medial Branch Radiofrequency neurotomy targeting the right and left L4-L5 and L5-S1 facet joint(s) -over 50% improvement in pain at targeted lumbar facets  - 25 trigger point injections       ROS Red Flags :   Fever, Chills, Sweats: Denies  Involuntary Weight Loss: Denies  Bowel/Bladder Incontinence: Denies  Saddle Anesthesia: Denies        PMHx:   Past Medical History:   Diagnosis Date    Arrhythmia 10/04/2024    History of SVT 20 years ago.    Diabetes (HCC)     GERD (gastroesophageal reflux disease)     Glaucoma     High cholesterol     HTN (hypertension)     Neuropathy        PSHx:   Past Surgical History:   Procedure Laterality Date    NC DSTR NROLYTC AGNT PARVERTEB FCT SNGL LMBR/SACRAL Bilateral 2025     Procedure: RIGHT and LEFT  radiofrequency neurotomies medial branch targeting the L4-5 and L5-S1 facet joints with fluoroscopic guidance and sedation…Note: Plan for 80 °C for 90 seconds for each neurotomy;  Surgeon: Haley Herron M.D.;  [facility];  Service: Pain Management    LUMBAR MEDIAL BRANCH BLOCKS Bilateral 2/6/2025    Procedure: Diagnostic medial branch blocks targeting the BILATERAL L4-5 and L5-S1 facet joints with fluoroscopic guidance #2;  Surgeon: Haley Herron M.D.;  Location: [facility];  Service: Pain Management    LUMBAR MEDIAL BRANCH BLOCKS  1/22/2025    Procedure: Diagnostic medial branch blocks targeting the BILATERAL L4-5 and L5-S1 facet joints with fluoroscopic guidance #1;  Surgeon: Haley Herron M.D.;  Location: [facility];  Service: Pain Management    MS LARYNGOSCOPY,DIRCT,OP SCOP,EXC TUMR Right 10/10/2024    Procedure: RIGHT MICRO LARYNGOSCOPY WITH TRUE VOCAL CORD STRIPPING, LASER;  Surgeon: Roni Vidales M.D.;  Location: SURGERY SAME DAY [facility];  Service: Ent    OTHER CARDIAC SURGERY  10/04/2024    Ablation 20 years ago for SVT    TONSILLECTOMY      as a child       Family history   Family History   Problem Relation Age of Onset    No Known Problems Mother     No Known Problems Father          Medications:   Outpatient Medications Marked as Taking for the 3/20/25 encounter (Office Visit) with Haley Herron M.D.   Medication Sig Dispense Refill    insulin glargine 100 UNIT/ML Solution Pen-injector injection 15      amLODIPine (NORVASC) 5 MG Tab 90      aspirin 81 MG EC tablet 0      atorvastatin (LIPITOR) 10 MG Tab 90      insulin lispro 100 UNIT/ML SC SOPN injection PEN 63      Insulin Degludec (TRESIBA FLEXTOUCH) 100 UNIT/ML Solution Pen-injector 15      omeprazole (PRILOSEC) 40 MG delayed-release capsule 90      amLODIPine (NORVASC) 5 MG Tab Take 1 tablet by mouth once daily 90 Tablet 0    olmesartan  (BENICAR) 20 MG Tab Take 1 Tablet by mouth every day. 100 Tablet 3    naproxen (NAPROSYN) 250 MG Tab Take 250 mg by mouth 2 times a day with meals.      ibuprofen (MOTRIN) 200 MG Tab Take 200 mg by mouth every 6 hours as needed.      insulin lispro 100 UNIT/ML SC SOPN injection PEN Inject 10 Units under the skin 3 times a day before meals. **MEDICATION INSTRUCTIONS FOR SURGERY/PROCEDURE SCHEDULED FOR 10/10/2024 COORDINATE MEDICATION INSTRUCTIONS FROM PRESCRIBING PHYSICIAN 10 Each 3    atorvastatin (LIPITOR) 10 MG Tab Take 1 Tablet by mouth every day. 90 Tablet 3    loratadine (CLARITIN) 10 MG Tab Take 10 mg by mouth every day.        **MEDICATION INSTRUCTIONS FOR SURGERY/PROCEDURE SCHEDULED FOR 10/10/2024   OK TO CONTINUE TAKING PRIOR TO SURGERY AND DAY OF SURGERY      COLLAGEN PO Take  by mouth every day.          **MEDICATION INSTRUCTIONS FOR SURGERY/PROCEDURE SCHEDULED FOR 10/10/2024   DO NOT TAKE 7 DAYS PRIOR TO SURGERY      omeprazole (PRILOSEC) 40 MG delayed-release capsule Take 1 Capsule by mouth every day. 90 Capsule 3    latanoprost (XALATAN) 0.005 % Solution        **MEDICATION INSTRUCTIONS FOR SURGERY/PROCEDURE SCHEDULED FOR 10/10/2024   OK TO CONTINUE TAKING PRIOR TO SURGERY AND NIGHT BEFORE SURGERY      tizanidine (ZANAFLEX) 4 MG Tab Take 0.5-1 Tablets by mouth at bedtime as needed (muscle spasms). 30 Tablet 2    Continuous Blood Gluc Sensor (FREESTYLE DON 14 DAY SENSOR) Misc 1 Each every 14 days. 6 Each 3    Insulin Degludec (TRESIBA FLEXTOUCH) 100 UNIT/ML Solution Pen-injector INJECT 50 SUBCUTANEOUSLY ONCE DAILY 15 mL 11    Glucagon (GVOKE HYPOPEN 1-PACK) 1 MG/0.2ML Solution Auto-injector Inject 1 mg under the skin one time as needed (hypoglycemia) for up to 1 dose. 0.2 mL 1    tadalafil (CIALIS) 20 MG tablet Take 1 Tablet by mouth as needed for Erectile Dysfunction. 30 Tablet 3    Insulin Pen Needle (PEN NEEDLES) 32G X 5 MM Misc Use to inject insulin 4x daily subq 100 Each 3    Continuous Blood  Gluc  (FREESTYLE DON 2 READER) Device Follow box instructions 1 Each 3    Multiple Vitamin (DAILY-JEANNA) Tab Take 1 Tablet by mouth every day.          **MEDICATION INSTRUCTIONS FOR SURGERY/PROCEDURE SCHEDULED FOR 10/10/2024   DO NOT TAKE 7 DAYS PRIOR TO SURGERY      glucosamine Sulfate 500 MG Cap Take 500 mg by mouth 3 times a day with meals. Once a day 1500 mg       **MEDICATION INSTRUCTIONS FOR SURGERY/PROCEDURE SCHEDULED FOR 10/10/2024   DO NOT TAKE 7 DAYS PRIOR TO SURGERY      aspirin (ASA) 81 MG Chew Tab chewable tablet Chew 81 mg every day.          **MEDICATION INSTRUCTIONS FOR SURGERY/PROCEDURE SCHEDULED FOR 10/10/2024   COORDINATE MEDICATION INSTRUCTIONS FROM PRESCRIBING PHYSICIAN.  PATIENT STATES LAST TAKEN ON 10/2/2024          Current Outpatient Medications on File Prior to Visit   Medication Sig Dispense Refill    insulin glargine 100 UNIT/ML Solution Pen-injector injection 15      amLODIPine (NORVASC) 5 MG Tab 90      aspirin 81 MG EC tablet 0      atorvastatin (LIPITOR) 10 MG Tab 90      insulin lispro 100 UNIT/ML SC SOPN injection PEN 63      Insulin Degludec (TRESIBA FLEXTOUCH) 100 UNIT/ML Solution Pen-injector 15      omeprazole (PRILOSEC) 40 MG delayed-release capsule 90      amLODIPine (NORVASC) 5 MG Tab Take 1 tablet by mouth once daily 90 Tablet 0    olmesartan (BENICAR) 20 MG Tab Take 1 Tablet by mouth every day. 100 Tablet 3    naproxen (NAPROSYN) 250 MG Tab Take 250 mg by mouth 2 times a day with meals.      ibuprofen (MOTRIN) 200 MG Tab Take 200 mg by mouth every 6 hours as needed.      insulin lispro 100 UNIT/ML SC SOPN injection PEN Inject 10 Units under the skin 3 times a day before meals. **MEDICATION INSTRUCTIONS FOR SURGERY/PROCEDURE SCHEDULED FOR 10/10/2024 COORDINATE MEDICATION INSTRUCTIONS FROM PRESCRIBING PHYSICIAN 10 Each 3    atorvastatin (LIPITOR) 10 MG Tab Take 1 Tablet by mouth every day. 90 Tablet 3    loratadine (CLARITIN) 10 MG Tab Take 10 mg by mouth every day.         **MEDICATION INSTRUCTIONS FOR SURGERY/PROCEDURE SCHEDULED FOR 10/10/2024   OK TO CONTINUE TAKING PRIOR TO SURGERY AND DAY OF SURGERY      COLLAGEN PO Take  by mouth every day.          **MEDICATION INSTRUCTIONS FOR SURGERY/PROCEDURE SCHEDULED FOR 10/10/2024   DO NOT TAKE 7 DAYS PRIOR TO SURGERY      omeprazole (PRILOSEC) 40 MG delayed-release capsule Take 1 Capsule by mouth every day. 90 Capsule 3    latanoprost (XALATAN) 0.005 % Solution        **MEDICATION INSTRUCTIONS FOR SURGERY/PROCEDURE SCHEDULED FOR 10/10/2024   OK TO CONTINUE TAKING PRIOR TO SURGERY AND NIGHT BEFORE SURGERY      tizanidine (ZANAFLEX) 4 MG Tab Take 0.5-1 Tablets by mouth at bedtime as needed (muscle spasms). 30 Tablet 2    Continuous Blood Gluc Sensor (FREESTYLE DON 14 DAY SENSOR) Misc 1 Each every 14 days. 6 Each 3    Insulin Degludec (TRESIBA FLEXTOUCH) 100 UNIT/ML Solution Pen-injector INJECT 50 SUBCUTANEOUSLY ONCE DAILY 15 mL 11    Glucagon (GVOKE HYPOPEN 1-PACK) 1 MG/0.2ML Solution Auto-injector Inject 1 mg under the skin one time as needed (hypoglycemia) for up to 1 dose. 0.2 mL 1    tadalafil (CIALIS) 20 MG tablet Take 1 Tablet by mouth as needed for Erectile Dysfunction. 30 Tablet 3    Insulin Pen Needle (PEN NEEDLES) 32G X 5 MM Misc Use to inject insulin 4x daily subq 100 Each 3    Continuous Blood Gluc  (FREESTYLE DON 2 READER) Device Follow box instructions 1 Each 3    Multiple Vitamin (DAILY-JEANNA) Tab Take 1 Tablet by mouth every day.          **MEDICATION INSTRUCTIONS FOR SURGERY/PROCEDURE SCHEDULED FOR 10/10/2024   DO NOT TAKE 7 DAYS PRIOR TO SURGERY      glucosamine Sulfate 500 MG Cap Take 500 mg by mouth 3 times a day with meals. Once a day 1500 mg       **MEDICATION INSTRUCTIONS FOR SURGERY/PROCEDURE SCHEDULED FOR 10/10/2024   DO NOT TAKE 7 DAYS PRIOR TO SURGERY      aspirin (ASA) 81 MG Chew Tab chewable tablet Chew 81 mg every day.          **MEDICATION INSTRUCTIONS FOR SURGERY/PROCEDURE SCHEDULED FOR  10/10/2024   COORDINATE MEDICATION INSTRUCTIONS FROM PRESCRIBING PHYSICIAN.  PATIENT STATES LAST TAKEN ON 10/2/2024       No current facility-administered medications on file prior to visit.         Allergies:   No Known Allergies    Social Hx:   Social History     Socioeconomic History    Marital status:      Spouse name: Not on file    Number of children: Not on file    Years of education: Not on file    Highest education level: Bachelor's degree (e.g., BA, AB, BS)   Occupational History    Not on file   Tobacco Use    Smoking status: Never     Passive exposure: Never    Smokeless tobacco: Never   Vaping Use    Vaping status: Never Used   Substance and Sexual Activity    Alcohol use: Yes     Alcohol/week: 1.2 oz     Types: 2 Cans of beer per week     Comment: Occ. beer or wine    Drug use: Never    Sexual activity: Yes     Partners: Female   Other Topics Concern    Not on file   Social History Narrative    Not on file     Social Drivers of Health     Financial Resource Strain: Low Risk  (12/9/2023)    Overall Financial Resource Strain (CARDIA)     Difficulty of Paying Living Expenses: Not hard at all   Food Insecurity: No Food Insecurity (12/9/2023)    Hunger Vital Sign     Worried About Running Out of Food in the Last Year: Never true     Ran Out of Food in the Last Year: Never true   Transportation Needs: No Transportation Needs (12/9/2023)    PRAPARE - Transportation     Lack of Transportation (Medical): No     Lack of Transportation (Non-Medical): No   Physical Activity: Sufficiently Active (12/9/2023)    Exercise Vital Sign     Days of Exercise per Week: 7 days     Minutes of Exercise per Session: 50 min   Stress: No Stress Concern Present (12/9/2023)    [employer] [place] [employer] - Occupational Stress Questionnaire     Feeling of Stress : Not at all   Social Connections: Socially Integrated (12/9/2023)    Social Connection and Isolation Panel [NHANES]     Frequency of Communication with  Friends and Family: More than three times a week     Frequency of Social Gatherings with Friends and Family: More than three times a week     Attends [employer] Services: 1 to 4 times per year     Active Member of Clubs or Organizations: Yes     Attends Club or Organization Meetings: More than 4 times per year     Marital Status:    Intimate Partner Violence: Not on file   Housing Stability: Low Risk  (12/9/2023)    Housing Stability Vital Sign     Unable to Pay for Housing in the Last Year: No     Number of Places Lived in the Last Year: 1     Unstable Housing in the Last Year: No         EXAMINATION     Physical Exam:   Vitals: /66 (BP Location: Right arm, Patient Position: Sitting, BP Cuff Size: Adult)   Pulse (!) 52   Temp 36.8 °C (98.2 °F) (Temporal)   Ht 1.829 m (6')   Wt 77 kg (169 lb 12.1 oz)   SpO2 96%     Constitutional:   Body Habitus: Body mass index is 23.02 kg/m².  Cooperation: Fully cooperates with exam  Appearance: Well-groomed, well-nourished.     Eyes: No scleral icterus to suggest severe liver disease, no proptosis to suggest severe hyperthyroidism     ENT -no obvious auditory deficits, no noticeable facial droop      Skin -no rashes or lesions noted      Respiratory-  breathing comfortably on room air, no audible wheezing     Cardiovascular-distal extremities warm and well perfused.  No lower extremity edema is noted.      Gastrointestinal - no obvious abdominal masses, non-distended     Psychiatric- alert and oriented ×3. Normal affect.      Gait - normal gait, no use of ambulatory device, nonantalgic.      Musculoskeletal and Neuro -      Thoracic/Lumbar Spine/Sacral Spine/Hips   Inspection: No evidence of atrophy in bilateral lower extremities throughout      Palpation:   Tenderness to palpation over left sacroiliac joint and tru sacroiliac area     Facet loading maneuver positive on left, negative on right    Lumbar spine /hip provocative exam maneuvers  Straight leg raise  "negative bilaterally  FADIR test negative bilaterally     SI joint tests  GRACIE test negative bilaterally        Key points for the international standards for neurological classification of spinal cord injury (ISNCSCI) to light touch.   Dermatome R L   L2 2 2   L3 2 2   L4 2 2   L5 2 2   S1 2 2   S2 2 2         Motor Exam Lower Extremities  ? Myotome R L   Hip flexion L2 5 5   Knee extension L3 5 5   Ankle dorsiflexion L4 5 5   Toe extension L5 5 5   Ankle plantarflexion S1 5 5      Previous exam  Reflexes  ?   R L   Patella   1+ 1+   Achilles    1+ 1+      Clonus of the ankle negative bilaterally                MEDICAL DECISION MAKING    DATA    Labs:   Lab Results   Component Value Date/Time    SODIUM 142 10/04/2024 11:21 AM    POTASSIUM 4.2 10/04/2024 11:21 AM    CHLORIDE 107 10/04/2024 11:21 AM    CO2 26 10/04/2024 11:21 AM    GLUCOSE 114 (H) 10/04/2024 11:21 AM    BUN 23 (H) 10/04/2024 11:21 AM    CREATININE 0.92 10/04/2024 11:21 AM        No results found for: \"PROTHROMBTM\", \"INR\"     Lab Results   Component Value Date/Time    WBC 6.7 02/29/2024 07:34 AM    RBC 4.95 02/29/2024 07:34 AM    HEMOGLOBIN 15.4 02/29/2024 07:34 AM    HEMATOCRIT 45.9 02/29/2024 07:34 AM    MCV 92.7 02/29/2024 07:34 AM    MCH 31.1 02/29/2024 07:34 AM    MCHC 33.6 02/29/2024 07:34 AM    MPV 11.1 02/29/2024 07:34 AM    NEUTSPOLYS 45.30 02/29/2024 07:34 AM    LYMPHOCYTES 40.50 02/29/2024 07:34 AM    MONOCYTES 9.50 02/29/2024 07:34 AM    EOSINOPHILS 4.20 02/29/2024 07:34 AM    BASOPHILS 0.40 02/29/2024 07:34 AM        Lab Results   Component Value Date/Time    HBA1C 6.8 (H) 12/20/2024 08:38 AM        Limited diagnostic ultrasound 2/11/2025 by Dr. Herron of the patient's left knee  Moderate joint effusion seen at suprapatellar recess. No obvious derangement of visualized meniscus at medial and lateral joint lines.  Images uploaded for permanent storage.    EMG 3/24/2023  Impression:  This is a normal electrodiagnostic study.  There is no " electrodiagnostic evidence of a generalized peripheral polyneuropathy, right median or ulnar mononeuropathy, sural mononeuropathy on either side, tibial mononeuropathy on either side, fibular mononeuropathy on either side, or lumbar plexopathy on either side.        Imaging:   I personally reviewed following images  MRI left knee 2/21/2025  Partial-thickness tear of MCL.  Medial meniscus tear.  Small joint effusion.  Mild ACL sprain. See formal radiology report for further details.      XR lumbar spine 6/7/23  Facet arthropathy of the bilateral lower lumbar levels.  Anterior body osteophyte at L4 and L5.  Disc space narrowing most notable at L3-4, L4-5, and L5-S1. See formal radiology report for further details.    XR hips 6/7/23  Moderate OA bilaterally. See formal radiology report for further details.     MRI lumbar spine 12/14/2024  At L1-2 there is mild right moderate left neuroforaminal stenosis.  At L2-3 there is mild to moderate central canal stenosis and bilateral neuroforaminal stenosis.  At L3-4 there is moderate central canal stenosis and moderate to severe bilateral neuroforaminal stenosis.  At L4-5 there is severe right and moderate left neuroforaminal stenosis and mild central canal stenosis.  At L5-S1 there is mild central canal stenosis and moderate to severe bilateral neuroforaminal stenosis.  Facet arthropathy at bilateral lower lumbar levels.  See formal radiology report for further details.    I reviewed the following radiology reports  MRI left knee 2/21/2025    FINDINGS:     Menisci: There is a horizontal type tear involving the posterior horn and body of the medial meniscus. The lateral meniscus is intact.     Tendons and Ligaments: There is some increased signal of the anterior cruciate ligament consistent with mild sprain. The posterior cruciate ligament is intact. There is high-grade partial thickness tear of the femoral origin of the medial collateral   ligament. Fibular collateral  ligament is intact.     Extensor Mechanism/Patella: The extensor mechanism is intact.   The articular cartilage is intact.     Osseous Structures/Cartilaginous surfaces: Intact. No evidence of marrow edema or fracture. There are small osteophytes involving the medial and lateral femorotibial articulations as well as the patellofemoral articulation.     Miscellaneous: There is a small joint effusion. There is no significant popliteal cyst.     IMPRESSION:     1.  High-grade partial-thickness tear of the femoral origin of the medial collateral ligament.     2.  Horizontal type tear of the posterior horn and body of the medial meniscus.     3.  Mild sprain of the anterior cruciate ligament.     4.  Small joint effusion.     5.  Mild degenerative change.     MRI lumbar spine 12/14/2024  FINDINGS: The lowest formed intervertebral disc will be designated L5-S1 for the purposes of this report and vertebral levels numbered accordingly.        The lumbar vertebral bodies have unremarkable height and no gross malalignment.  No acute or suspicious osseous lesion is seen.  There is loss of intervertebral disc height throughout the lumbar spine, mild at L1-L2 and moderate at the other lumbar levels.  There are prominent Modic type I/II degenerative endplate changes adjacent to the L2-L3 intervertebral disc with Modic type II degenerative endplate changes at L3-L4, L4-L5 and L5-S1.  There are small Schmorl nodes in the inferior endplate of L2, both endplates of L3 and the superior endplate of L4.  The conus medullaris has a normal caliber course and signal intensity.     Level specific findings as follows:     L1-2: Mild diffuse disc bulge. Mild RIGHT and moderate LEFT neural foraminal narrowing.  L2-3: Diffuse disc bulge. Mild to moderate BILATERAL facet arthropathy. Mild to moderate central canal narrowing and BILATERAL neural foraminal narrowing.  L3-4: Diffuse disc bulge. Moderate BILATERAL facet arthropathy. Mild to moderate  central canal narrowing. Moderate to severe BILATERAL neural foraminal narrowing.  L4-5: Diffuse disc bulge. Severe RIGHT and moderate to severe LEFT facet arthropathy. Severe RIGHT and moderate LEFT neural foraminal narrowing. Mild central canal narrowing.  L5-S1: Diffuse disc bulge which approximates and probably contacts the traversing BILATERAL S1 nerve roots. Moderate BILATERAL facet arthropathy. Mild central canal narrowing. Moderate to severe BILATERAL neural foraminal narrowing.     There are changes of BILATERAL sacroiliac arthropathy.     Soft tissues: No abdominal aortic aneurysm or soft tissue mass is seen.     IMPRESSION:     1.  Multilevel multifactorial degenerative changes  2.  No areas of high-grade central canal narrowing  3.  Areas of central canal and neural foraminal narrowing as described above  4.  Disc disease at L5-S1 probably contacts the BILATERAL traversing S1 nerve roots.    X-ray hip 6/22/2017  AP pelvis and left lateral hip films were ordered performed and   interpreted by us today and reveal moderate heart osteoarthritic changes   in both hips with femoral head flattening noticed bilaterally with the   right mi reviewed left and sclerotic changes at the superior acetabular   dome bilaterally and early spur formation superolaterally consistent with   osteoarthritis.                                 XR lumbar spine 6/7/23  FINDINGS:  Vertebral body height is well maintained.  There is no evidence of fracture.  Vertebral alignment is normal.  There is moderate to severe degenerative disc disease and facet arthropathy mainly at the L3-S1 levels.        IMPRESSION:     1.  No compression deformity or acute fracture is identified.     2.  There is significant degenerative disc disease and facet arthropathy throughout the lower lumbar spine.             XR hips 6/7/23  FINDINGS:  There is no evidence of acute fracture involving the pelvis. No proximal femoral fracture is identified.  There is  moderate joint space narrowing. A core sclerosis and marginal spurring. There is mild flattening of the right femoral head. There is some linear lucency within the right femoral head.     IMPRESSION:     1.  No radiographic evidence of acute traumatic injury.     2.  Findings are consistent with moderate osteoarthritis bilaterally.     3.  There is some flattening of the right femoral head as well as some lucency in the mid femoral head area. Avascular necrosis is a possibility. Prior right femoral head fracture is also possible.           DIAGNOSIS   Visit Diagnoses     ICD-10-CM   1. Myalgia  M79.10                   ASSESSMENT and PLAN:     Sukumar Tyson  1955 male with low back pain and left knee pain      Darrius was seen today for follow-up.    Diagnoses and all orders for this visit:    Myalgia  -     Consent for all Surgical, Special Diagnostic or Therapeutic Procedures    Other orders  -     lidocaine (Xylocaine) 1 % injection 5 mL  -     bupivacaine (pf) (Marcaine/Sensorcaine) 0.5 % injection 5 mL            Physical Therapy: Discussed my recommendation to continue home exercise program for low back pain.  It is my impression that some of his low back pain is contributed by altered biomechanics while he has been wanting due to his new left knee pain      Diagnostic workup:Personally reviewed at today's visit:   MRI left knee 2025   Personally reviewed at today's visit: Fluoroscopic images from 25 indicating successful  medial Branch Radiofrequency neurotomy targeting the right and left L4-L5 and L5-S1 facet joint(s)    Medications:   -Continue tizanidine and mobic PRN which he has taken with some relief.    Interventions:    -Trigger point injections today to target pain at low back  - discussed radiofrequency ablation of lumbar medial branch nerves targeting Bilateral L4-L5 and L5-S1 facet joints given over 80% relief of index pain after diagnostic lumbar medial branch blocks  targeting the bilateral L4-L5 and L5-S1 facet joints #1 and #2.    -Discussed possible epidural if his sciatica pain returns and is severe enough to warrant an injection    Other  -Continue follow-up with orthopedics regarding left knee    Follow up: 3- 4 weeks after lumbar radiofrequency ablation    Haley Herron MD  Interventional Pain and Spine  Physical Medicine and Rehabilitation  [facility]

## 2025-04-14 ENCOUNTER — HOSPITAL ENCOUNTER (OUTPATIENT)
Dept: LAB | Facility: MEDICAL CENTER | Age: 70
End: 2025-04-14
Attending: INTERNAL MEDICINE
Payer: MEDICARE

## 2025-04-14 DIAGNOSIS — E10.9 TYPE 1 DIABETES MELLITUS WITHOUT COMPLICATION (HCC): ICD-10-CM

## 2025-04-14 PROCEDURE — 82306 VITAMIN D 25 HYDROXY: CPT

## 2025-04-14 PROCEDURE — 36415 COLL VENOUS BLD VENIPUNCTURE: CPT

## 2025-04-14 PROCEDURE — 83036 HEMOGLOBIN GLYCOSYLATED A1C: CPT | Mod: GA

## 2025-04-15 LAB
25(OH)D3 SERPL-MCNC: 43 NG/ML (ref 30–100)
EST. AVERAGE GLUCOSE BLD GHB EST-MCNC: 154 MG/DL
HBA1C MFR BLD: 7 % (ref 4–5.6)

## 2025-04-21 ENCOUNTER — OFFICE VISIT (OUTPATIENT)
Dept: MEDICAL GROUP | Facility: LAB | Age: 70
End: 2025-04-21
Payer: MEDICARE

## 2025-04-21 VITALS
OXYGEN SATURATION: 96 % | HEART RATE: 54 BPM | RESPIRATION RATE: 16 BRPM | DIASTOLIC BLOOD PRESSURE: 64 MMHG | HEIGHT: 72 IN | TEMPERATURE: 96.7 F | SYSTOLIC BLOOD PRESSURE: 130 MMHG | BODY MASS INDEX: 22.46 KG/M2 | WEIGHT: 165.8 LBS

## 2025-04-21 DIAGNOSIS — N52.9 ERECTILE DYSFUNCTION, UNSPECIFIED ERECTILE DYSFUNCTION TYPE: ICD-10-CM

## 2025-04-21 DIAGNOSIS — Z12.5 SCREENING FOR PROSTATE CANCER: ICD-10-CM

## 2025-04-21 DIAGNOSIS — E10.9 TYPE 1 DIABETES MELLITUS WITHOUT COMPLICATION (HCC): ICD-10-CM

## 2025-04-21 DIAGNOSIS — E78.2 MIXED HYPERLIPIDEMIA: ICD-10-CM

## 2025-04-21 DIAGNOSIS — I10 ESSENTIAL HYPERTENSION: ICD-10-CM

## 2025-04-21 PROCEDURE — 99214 OFFICE O/P EST MOD 30 MIN: CPT | Performed by: INTERNAL MEDICINE

## 2025-04-21 PROCEDURE — 3075F SYST BP GE 130 - 139MM HG: CPT | Performed by: INTERNAL MEDICINE

## 2025-04-21 PROCEDURE — 3078F DIAST BP <80 MM HG: CPT | Performed by: INTERNAL MEDICINE

## 2025-04-21 RX ORDER — TADALAFIL 20 MG/1
20 TABLET ORAL PRN
Qty: 30 TABLET | Refills: 3 | Status: SHIPPED | OUTPATIENT
Start: 2025-04-21

## 2025-04-21 RX ORDER — OLMESARTAN MEDOXOMIL 40 MG/1
40 TABLET ORAL DAILY
Qty: 100 TABLET | Refills: 3 | Status: SHIPPED | OUTPATIENT
Start: 2025-04-21 | End: 2026-05-26

## 2025-04-21 ASSESSMENT — FIBROSIS 4 INDEX: FIB4 SCORE: 1.49

## 2025-04-22 NOTE — PROGRESS NOTES
CC: Sukumar Tyson is a 70 y.o. male is suffering from   Chief Complaint   Patient presents with    Follow-Up         SUBJECTIVE:  1. Mixed hyperlipidemia  Darrius is here for follow-up has a history of dyslipidemia, will recheck lipid profile    2. Type 1 diabetes mellitus without complication (HCC)  History of type 1 diabetes clinically stable    3. Essential hypertension  Increase Benicar from 20 to 40 mg    4. Screening for prostate cancer  PSA ordered    5. Erectile dysfunction, unspecified erectile dysfunction type  Continue tadalafil    History of Present Illness              Past social, family, history:   Social History     Tobacco Use    Smoking status: Never     Passive exposure: Never    Smokeless tobacco: Never   Vaping Use    Vaping status: Never Used   Substance Use Topics    Alcohol use: Yes     Alcohol/week: 1.2 oz     Types: 2 Cans of beer per week     Comment: Occ. beer or wine    Drug use: Never         MEDICATIONS:    Current Outpatient Medications:     olmesartan (BENICAR) 40 MG Tab, Take 1 Tablet by mouth every day., Disp: 100 Tablet, Rfl: 3    tadalafil 20 MG tablet, Take 1 Tablet by mouth as needed for Erectile Dysfunction., Disp: 30 Tablet, Rfl: 3    insulin glargine 100 UNIT/ML Solution Pen-injector injection, 15, Disp: , Rfl:     amLODIPine (NORVASC) 5 MG Tab, 90, Disp: , Rfl:     aspirin 81 MG EC tablet, 0, Disp: , Rfl:     atorvastatin (LIPITOR) 10 MG Tab, 90, Disp: , Rfl:     insulin lispro 100 UNIT/ML SC SOPN injection PEN, 63, Disp: , Rfl:     Insulin Degludec (TRESIBA FLEXTOUCH) 100 UNIT/ML Solution Pen-injector, 15, Disp: , Rfl:     omeprazole (PRILOSEC) 40 MG delayed-release capsule, 90, Disp: , Rfl:     amLODIPine (NORVASC) 5 MG Tab, Take 1 tablet by mouth once daily, Disp: 90 Tablet, Rfl: 0    naproxen (NAPROSYN) 250 MG Tab, Take 250 mg by mouth 2 times a day with meals., Disp: , Rfl:     ibuprofen (MOTRIN) 200 MG Tab, Take 200 mg by mouth every 6 hours as needed.,  Disp: , Rfl:     insulin lispro 100 UNIT/ML SC SOPN injection PEN, Inject 10 Units under the skin 3 times a day before meals. **MEDICATION INSTRUCTIONS FOR SURGERY/PROCEDURE SCHEDULED FOR 10/10/2024 COORDINATE MEDICATION INSTRUCTIONS FROM PRESCRIBING PHYSICIAN, Disp: 10 Each, Rfl: 3    atorvastatin (LIPITOR) 10 MG Tab, Take 1 Tablet by mouth every day., Disp: 90 Tablet, Rfl: 3    loratadine (CLARITIN) 10 MG Tab, Take 10 mg by mouth every day.     **MEDICATION INSTRUCTIONS FOR SURGERY/PROCEDURE SCHEDULED FOR 10/10/2024  OK TO CONTINUE TAKING PRIOR TO SURGERY AND DAY OF SURGERY, Disp: , Rfl:     COLLAGEN PO, Take  by mouth every day.       **MEDICATION INSTRUCTIONS FOR SURGERY/PROCEDURE SCHEDULED FOR 10/10/2024  DO NOT TAKE 7 DAYS PRIOR TO SURGERY, Disp: , Rfl:     omeprazole (PRILOSEC) 40 MG delayed-release capsule, Take 1 Capsule by mouth every day., Disp: 90 Capsule, Rfl: 3    latanoprost (XALATAN) 0.005 % Solution,      **MEDICATION INSTRUCTIONS FOR SURGERY/PROCEDURE SCHEDULED FOR 10/10/2024  OK TO CONTINUE TAKING PRIOR TO SURGERY AND NIGHT BEFORE SURGERY, Disp: , Rfl:     tizanidine (ZANAFLEX) 4 MG Tab, Take 0.5-1 Tablets by mouth at bedtime as needed (muscle spasms)., Disp: 30 Tablet, Rfl: 2    Continuous Blood Gluc Sensor (FREESTYLE DON 14 DAY SENSOR) Misc, 1 Each every 14 days., Disp: 6 Each, Rfl: 3    Insulin Degludec (TRESIBA FLEXTOUCH) 100 UNIT/ML Solution Pen-injector, INJECT 50 SUBCUTANEOUSLY ONCE DAILY, Disp: 15 mL, Rfl: 11    Glucagon (GVOKE HYPOPEN 1-PACK) 1 MG/0.2ML Solution Auto-injector, Inject 1 mg under the skin one time as needed (hypoglycemia) for up to 1 dose., Disp: 0.2 mL, Rfl: 1    Insulin Pen Needle (PEN NEEDLES) 32G X 5 MM Misc, Use to inject insulin 4x daily subq, Disp: 100 Each, Rfl: 3    Continuous Blood Gluc  (FREESTYLE DON 2 READER) Device, Follow box instructions, Disp: 1 Each, Rfl: 3    Multiple Vitamin (DAILY-JEANNA) Tab, Take 1 Tablet by mouth every day.        **MEDICATION INSTRUCTIONS FOR SURGERY/PROCEDURE SCHEDULED FOR 10/10/2024  DO NOT TAKE 7 DAYS PRIOR TO SURGERY, Disp: , Rfl:     glucosamine Sulfate 500 MG Cap, Take 500 mg by mouth 3 times a day with meals. Once a day 1500 mg    **MEDICATION INSTRUCTIONS FOR SURGERY/PROCEDURE SCHEDULED FOR 10/10/2024  DO NOT TAKE 7 DAYS PRIOR TO SURGERY, Disp: , Rfl:     aspirin (ASA) 81 MG Chew Tab chewable tablet, Chew 81 mg every day.       **MEDICATION INSTRUCTIONS FOR SURGERY/PROCEDURE SCHEDULED FOR 10/10/2024  COORDINATE MEDICATION INSTRUCTIONS FROM PRESCRIBING PHYSICIAN. PATIENT STATES LAST TAKEN ON 10/2/2024, Disp: , Rfl:     PROBLEMS:  Patient Active Problem List    Diagnosis Date Noted    Type 1 diabetes mellitus with diabetic neuropathy, unspecified (HCC) 07/24/2023    Laryngitis 04/10/2023    Neuropathy 01/23/2023    Gastroesophageal reflux disease without esophagitis 04/18/2022    Other chest pain 01/20/2022    Chronic low back pain without sciatica 12/01/2020    Type 1 diabetes mellitus without complication (HCC) 06/01/2020    Mixed hyperlipidemia 06/01/2020    Essential hypertension 06/01/2020    Other male erectile dysfunction 06/01/2020       REVIEW OF SYSTEMS:  Gen.:  No Nausea, Vomiting, fever, Chills.  Heart: No chest pain.  Lungs:  No shortness of Breath.  Psychological: Regan unusual Anxiety depression     PHYSICAL EXAM   Physical Exam             Constitutional: Alert, cooperative, not in acute distress.  Cardiovascular:  Rate Rhythm is regular without murmurs rubs clicks.     Thorax & Lungs: Clear to auscultation, no wheezing, rhonchi, or rales  HENT: Normocephalic, Atraumatic.  Eyes: PERRLA, EOMI, Conjunctiva normal.   Neck: Trachia is midline no swelling of the thyroid.   Lymphatic: No lymphadenopathy noted.   Musculoskeletal: No evidence of diabetic neuropathy to monofilament wire testing  Neurologic: Alert & oriented x 3, cranial nerves II through XII are intact, Normal motor function, Normal sensory  function, No focal deficits noted.   Psychiatric: Affect normal, Judgment normal, Mood normal.     VITAL SIGNS:/64 (BP Location: Right arm, Patient Position: Sitting, BP Cuff Size: Adult)   Pulse (!) 54   Temp 35.9 °C (96.7 °F) (Temporal)   Resp 16   Ht 1.829 m (6')   Wt 75.2 kg (165 lb 12.8 oz)   SpO2 96%   BMI 22.49 kg/m²     Labs: Reviewed    Assessment:                                                     Plan:  [unfilled]             1. Mixed hyperlipidemia  Recheck lipid profile  - Lipid Profile; Future    2. Type 1 diabetes mellitus without complication (HCC)  Clinically stable  - Diabetic Monofilament Lower Extremity Exam  - HEMOGLOBIN A1C; Future  - PROSTATE SPECIFIC AG SCREENING; Future  - Comp Metabolic Panel; Future    3. Essential hypertension  Increase Benicar to 40 mg  - olmesartan (BENICAR) 40 MG Tab; Take 1 Tablet by mouth every day.  Dispense: 100 Tablet; Refill: 3  - HEMOGLOBIN A1C; Future    4. Screening for prostate cancer  Screening for prostate cancer ordered  - PROSTATE SPECIFIC AG SCREENING; Future    5. Erectile dysfunction, unspecified erectile dysfunction type  Continue tadalafil  - tadalafil 20 MG tablet; Take 1 Tablet by mouth as needed for Erectile Dysfunction.  Dispense: 30 Tablet; Refill: 3

## 2025-05-20 ENCOUNTER — APPOINTMENT (OUTPATIENT)
Dept: PHYSICAL MEDICINE AND REHAB | Facility: MEDICAL CENTER | Age: 70
End: 2025-05-20
Payer: MEDICARE

## 2025-05-24 DIAGNOSIS — I15.9 SECONDARY HYPERTENSION: ICD-10-CM

## 2025-05-27 RX ORDER — AMLODIPINE BESYLATE 5 MG/1
5 TABLET ORAL DAILY
Qty: 90 TABLET | Refills: 3 | Status: SHIPPED | OUTPATIENT
Start: 2025-05-27

## 2025-05-27 NOTE — TELEPHONE ENCOUNTER
Received request via: Pharmacy    Was the patient seen in the last year in this department? Yes    Does the patient have an active prescription (recently filled or refills available) for medication(s) requested? No    Pharmacy Name: [facility]    Does the patient have intermediate Plus and need 100-day supply? (This applies to ALL medications) Patient does not have SCP

## 2025-06-03 ENCOUNTER — APPOINTMENT (OUTPATIENT)
Dept: PHYSICAL MEDICINE AND REHAB | Facility: MEDICAL CENTER | Age: 70
End: 2025-06-03
Payer: MEDICARE

## 2025-06-03 VITALS
WEIGHT: 165 LBS | OXYGEN SATURATION: 96 % | HEART RATE: 54 BPM | DIASTOLIC BLOOD PRESSURE: 72 MMHG | TEMPERATURE: 98.3 F | BODY MASS INDEX: 22.35 KG/M2 | HEIGHT: 72 IN | SYSTOLIC BLOOD PRESSURE: 144 MMHG

## 2025-06-03 DIAGNOSIS — M25.552 CHRONIC PAIN OF BOTH HIPS: ICD-10-CM

## 2025-06-03 DIAGNOSIS — G89.29 CHRONIC PAIN OF LEFT KNEE: ICD-10-CM

## 2025-06-03 DIAGNOSIS — M47.816 LUMBAR SPONDYLOSIS: ICD-10-CM

## 2025-06-03 DIAGNOSIS — G89.29 CHRONIC BILATERAL LOW BACK PAIN WITHOUT SCIATICA: ICD-10-CM

## 2025-06-03 DIAGNOSIS — M54.50 CHRONIC BILATERAL LOW BACK PAIN WITHOUT SCIATICA: ICD-10-CM

## 2025-06-03 DIAGNOSIS — M25.562 ACUTE PAIN OF LEFT KNEE: ICD-10-CM

## 2025-06-03 DIAGNOSIS — M25.551 CHRONIC PAIN OF BOTH HIPS: ICD-10-CM

## 2025-06-03 DIAGNOSIS — M25.562 CHRONIC PAIN OF LEFT KNEE: ICD-10-CM

## 2025-06-03 DIAGNOSIS — G89.29 CHRONIC PAIN OF BOTH HIPS: ICD-10-CM

## 2025-06-03 DIAGNOSIS — M79.10 MYALGIA: Primary | ICD-10-CM

## 2025-06-03 DIAGNOSIS — M54.50 ACUTE BILATERAL LOW BACK PAIN WITHOUT SCIATICA: ICD-10-CM

## 2025-06-03 DIAGNOSIS — M25.362 KNEE INSTABILITY, LEFT: ICD-10-CM

## 2025-06-03 DIAGNOSIS — M67.951 TENDINOPATHY OF RIGHT GLUTEUS MEDIUS: ICD-10-CM

## 2025-06-03 DIAGNOSIS — G62.9 NEUROPATHY: ICD-10-CM

## 2025-06-03 DIAGNOSIS — M79.18 GLUTEAL PAIN: ICD-10-CM

## 2025-06-03 PROCEDURE — 3078F DIAST BP <80 MM HG: CPT | Performed by: STUDENT IN AN ORGANIZED HEALTH CARE EDUCATION/TRAINING PROGRAM

## 2025-06-03 PROCEDURE — 1125F AMNT PAIN NOTED PAIN PRSNT: CPT | Performed by: STUDENT IN AN ORGANIZED HEALTH CARE EDUCATION/TRAINING PROGRAM

## 2025-06-03 PROCEDURE — 99214 OFFICE O/P EST MOD 30 MIN: CPT | Performed by: STUDENT IN AN ORGANIZED HEALTH CARE EDUCATION/TRAINING PROGRAM

## 2025-06-03 PROCEDURE — 3077F SYST BP >= 140 MM HG: CPT | Performed by: STUDENT IN AN ORGANIZED HEALTH CARE EDUCATION/TRAINING PROGRAM

## 2025-06-03 ASSESSMENT — FIBROSIS 4 INDEX: FIB4 SCORE: 1.49

## 2025-06-03 ASSESSMENT — PATIENT HEALTH QUESTIONNAIRE - PHQ9: CLINICAL INTERPRETATION OF PHQ2 SCORE: 0

## 2025-06-03 ASSESSMENT — PAIN SCALES - GENERAL: PAINLEVEL_OUTOF10: 4=SLIGHT-MODERATE PAIN

## 2025-06-03 NOTE — PROGRESS NOTES
Verbal consent was acquired by the patient to use [employer] ambient listening note generation during this visit Yes     Follow up patient note  Interventional spine and Pain  Physiatry (physical medicine and  Rehabilitation)       Chief complaint:   Chief Complaint   Patient presents with    Follow-Up     2m fv           HISTORY (2/17/2023):  Sukumar Tyson is a 67 y.o. male who presents today with a dull constant discomfort in his upper thighs for a couple of months and left big toe for several years. This doesn't limit him from doing activities. He is still able to ski without difficulty or exacerbation of pain. He denies noticeable numbness/tingling or weakness in his legs. He notes a history of right sciatica but denies current symptoms of this.  He was referred for an EMG by his PCP. He remembers getting an EMG at least 20 years ago in [place] which was normal. Notes that his A1c has typically been in the 6% range but his most recent A1c was 7.4%. This is the highest it's been.      Pain right now is 2/10 on the numeric pain scale. His pain at its best-worse level during the course of the day is typically 2-3/10, respectively.  Pain worsens with sitting and side bending or twisting and improves with standing, walking, bending forward, and bending backwards. His pain does not interfere with ADLs. The patient denies weakness, numbness, or bladder/bowel incontinence.     Notes that he has been limited by low back pain and hip pain in the past.  An x-ray in 2017 showed signs of very mild hip OA.  He remembers seeing a doctor who mentioned that his pain could be related to OA.  He has not done dedicated physical therapy for this in the past.     The patient has not done physical therapy for this problem. Has done PT for his back and still does home exercise program      Patient has tried the following medications with varied success (current meds in bold):   Oral naproxen PRN prior to physical activity  primarily for back and hip pain     Medical history includes T1DM, HLD, HTN.    HPI  Patient identification: Sukumar Tyson ,  1955,   With The primary encounter diagnosis was Myalgia. Diagnoses of Lumbar spondylosis, Knee instability, left, Chronic pain of left knee, Chronic bilateral low back pain without sciatica, Chronic pain of both hips, Neuropathy, Gluteal pain, Tendinopathy of right gluteus medius, Acute pain of left knee, and Acute bilateral low back pain without sciatica were also pertinent to this visit.     History of Present Illness  The patient is a 70-year-old male who presents for follow-up.    Left Lower Back Pain  - Trigger point injections did not alleviate left lower back pain, but overall back condition improved.  - Pain occurs when transitioning from seated/reclining to standing, less intense than before.  - Pain when driving is less severe.  - No pain when touching toes or walking for 30 minutes.  - Prolonged sitting does not cause discomfort.  - No numbness or tingling in legs.  - Manages pain with meloxicam as needed, discontinued ibuprofen, occasionally takes Tylenol in the morning.    Left knee Pain  - Previously experienced knee pain, used brace, now discontinued with improvement.  - Knee pain persists but does not prevent him from pursuing his usual recreational activities  - Plans to resume skiing next year.  -He continues with physical therapy exercises for his left knee    Supplemental information: Previously tried Voltaren gel for knee pain.    MEDICATIONS  - Meloxicam  - Tylenol      Procedure history:  -5/15/2023 trigger point injections with Dr. Watters  -significant relief.   -24 trigger point injections -1 day of relief  -2025 diagnostic lumbar medial branch blocks targeting the bilateral L4-L5 and L5-S1 facet joints-at least 80% improvement in index pain  -25 diagnostic lumbar medial branch blocks targeting the bilateral L4-L5 and L5-S1 facet  joints-at least 80% improvement in index pain  -2/20/2025 medial Branch Radiofrequency neurotomy targeting the right and left L4-L5 and L5-S1 facet joint(s) -over 50% improvement in pain at targeted lumbar facets  - 03/20/25 trigger point injections- no significant improvement      ROS Red Flags :   Fever, Chills, Sweats: Denies  Involuntary Weight Loss: Denies  Bowel/Bladder Incontinence: Denies  Saddle Anesthesia: Denies        PMHx:   Past Medical History:   Diagnosis Date    Arrhythmia 10/04/2024    History of SVT 20 years ago.    Diabetes (HCC)     GERD (gastroesophageal reflux disease)     Glaucoma     High cholesterol     HTN (hypertension)     Neuropathy        PSHx:   Past Surgical History:   Procedure Laterality Date    OK DSTR NROLYTC AGNT PARVERTEB FCT SNGL LMBR/SACRAL Bilateral 2/20/2025    Procedure: RIGHT and LEFT  radiofrequency neurotomies medial branch targeting the L4-5 and L5-S1 facet joints with fluoroscopic guidance and sedation…Note: Plan for 80 °C for 90 seconds for each neurotomy;  Surgeon: Haley Herron M.D.;  [facility];  Service: Pain Management    LUMBAR MEDIAL BRANCH BLOCKS Bilateral 2/6/2025    Procedure: Diagnostic medial branch blocks targeting the BILATERAL L4-5 and L5-S1 facet joints with fluoroscopic guidance #2;  Surgeon: Haley Herron M.D.;  Location: [facility];  Service: Pain Management    LUMBAR MEDIAL BRANCH BLOCKS  1/22/2025    Procedure: Diagnostic medial branch blocks targeting the BILATERAL L4-5 and L5-S1 facet joints with fluoroscopic guidance #1;  Surgeon: Haley Herron M.D.;  Location: [facility];  Service: Pain Management    OK LARYNGOSCOPY,DIRCT,OP SCOP,EXC TUMR Right 10/10/2024    Procedure: RIGHT MICRO LARYNGOSCOPY WITH TRUE VOCAL CORD STRIPPING, LASER;  Surgeon: Roni Vidales M.D.;  Location: SURGERY [facility];  Service: Ent    OTHER CARDIAC SURGERY  10/04/2024    Ablation 20  years ago for SVT    TONSILLECTOMY      as a child       Family history   Family History   Problem Relation Age of Onset    No Known Problems Mother     No Known Problems Father          Medications:   Outpatient Medications Marked as Taking for the 6/3/25 encounter (Office Visit) with Haley Herron M.D.   Medication Sig Dispense Refill    amLODIPine (NORVASC) 5 MG Tab Take 1 tablet by mouth once daily 90 Tablet 3    olmesartan (BENICAR) 40 MG Tab Take 1 Tablet by mouth every day. 100 Tablet 3    tadalafil 20 MG tablet Take 1 Tablet by mouth as needed for Erectile Dysfunction. 30 Tablet 3    aspirin 81 MG EC tablet 0      atorvastatin (LIPITOR) 10 MG Tab 90      insulin lispro 100 UNIT/ML SC SOPN injection PEN 63      Insulin Degludec (TRESIBA FLEXTOUCH) 100 UNIT/ML Solution Pen-injector 15      ibuprofen (MOTRIN) 200 MG Tab Take 200 mg by mouth every 6 hours as needed.      insulin lispro 100 UNIT/ML SC SOPN injection PEN Inject 10 Units under the skin 3 times a day before meals. **MEDICATION INSTRUCTIONS FOR SURGERY/PROCEDURE SCHEDULED FOR 10/10/2024 COORDINATE MEDICATION INSTRUCTIONS FROM PRESCRIBING PHYSICIAN 10 Each 3    atorvastatin (LIPITOR) 10 MG Tab Take 1 Tablet by mouth every day. 90 Tablet 3    COLLAGEN PO Take  by mouth every day.          **MEDICATION INSTRUCTIONS FOR SURGERY/PROCEDURE SCHEDULED FOR 10/10/2024   DO NOT TAKE 7 DAYS PRIOR TO SURGERY      latanoprost (XALATAN) 0.005 % Solution        **MEDICATION INSTRUCTIONS FOR SURGERY/PROCEDURE SCHEDULED FOR 10/10/2024   OK TO CONTINUE TAKING PRIOR TO SURGERY AND NIGHT BEFORE SURGERY      Continuous Blood Gluc Sensor (FREESTYLE DON 14 DAY SENSOR) Misc 1 Each every 14 days. 6 Each 3    Insulin Degludec (TRESIBA FLEXTOUCH) 100 UNIT/ML Solution Pen-injector INJECT 50 SUBCUTANEOUSLY ONCE DAILY 15 mL 11    Glucagon (GVOKE HYPOPEN 1-PACK) 1 MG/0.2ML Solution Auto-injector Inject 1 mg under the skin one time as needed (hypoglycemia) for up to 1 dose. 0.2  mL 1    Insulin Pen Needle (PEN NEEDLES) 32G X 5 MM Misc Use to inject insulin 4x daily subq 100 Each 3    Continuous Blood Gluc  (FREESTYLE DON 2 READER) Device Follow box instructions 1 Each 3    Multiple Vitamin (DAILY-JEANNA) Tab Take 1 Tablet by mouth every day.          **MEDICATION INSTRUCTIONS FOR SURGERY/PROCEDURE SCHEDULED FOR 10/10/2024   DO NOT TAKE 7 DAYS PRIOR TO SURGERY      glucosamine Sulfate 500 MG Cap Take 500 mg by mouth 3 times a day with meals. Once a day 1500 mg       **MEDICATION INSTRUCTIONS FOR SURGERY/PROCEDURE SCHEDULED FOR 10/10/2024   DO NOT TAKE 7 DAYS PRIOR TO SURGERY      aspirin (ASA) 81 MG Chew Tab chewable tablet Chew 81 mg every day.          **MEDICATION INSTRUCTIONS FOR SURGERY/PROCEDURE SCHEDULED FOR 10/10/2024   COORDINATE MEDICATION INSTRUCTIONS FROM PRESCRIBING PHYSICIAN.  PATIENT STATES LAST TAKEN ON 10/2/2024          Current Outpatient Medications on File Prior to Visit   Medication Sig Dispense Refill    amLODIPine (NORVASC) 5 MG Tab Take 1 tablet by mouth once daily 90 Tablet 3    olmesartan (BENICAR) 40 MG Tab Take 1 Tablet by mouth every day. 100 Tablet 3    tadalafil 20 MG tablet Take 1 Tablet by mouth as needed for Erectile Dysfunction. 30 Tablet 3    aspirin 81 MG EC tablet 0      atorvastatin (LIPITOR) 10 MG Tab 90      insulin lispro 100 UNIT/ML SC SOPN injection PEN 63      Insulin Degludec (TRESIBA FLEXTOUCH) 100 UNIT/ML Solution Pen-injector 15      ibuprofen (MOTRIN) 200 MG Tab Take 200 mg by mouth every 6 hours as needed.      insulin lispro 100 UNIT/ML SC SOPN injection PEN Inject 10 Units under the skin 3 times a day before meals. **MEDICATION INSTRUCTIONS FOR SURGERY/PROCEDURE SCHEDULED FOR 10/10/2024 COORDINATE MEDICATION INSTRUCTIONS FROM PRESCRIBING PHYSICIAN 10 Each 3    atorvastatin (LIPITOR) 10 MG Tab Take 1 Tablet by mouth every day. 90 Tablet 3    COLLAGEN PO Take  by mouth every day.          **MEDICATION INSTRUCTIONS FOR  SURGERY/PROCEDURE SCHEDULED FOR 10/10/2024   DO NOT TAKE 7 DAYS PRIOR TO SURGERY      latanoprost (XALATAN) 0.005 % Solution        **MEDICATION INSTRUCTIONS FOR SURGERY/PROCEDURE SCHEDULED FOR 10/10/2024   OK TO CONTINUE TAKING PRIOR TO SURGERY AND NIGHT BEFORE SURGERY      Continuous Blood Gluc Sensor (FREESTYLE DON 14 DAY SENSOR) Misc 1 Each every 14 days. 6 Each 3    Insulin Degludec (TRESIBA FLEXTOUCH) 100 UNIT/ML Solution Pen-injector INJECT 50 SUBCUTANEOUSLY ONCE DAILY 15 mL 11    Glucagon (GVOKE HYPOPEN 1-PACK) 1 MG/0.2ML Solution Auto-injector Inject 1 mg under the skin one time as needed (hypoglycemia) for up to 1 dose. 0.2 mL 1    Insulin Pen Needle (PEN NEEDLES) 32G X 5 MM Misc Use to inject insulin 4x daily subq 100 Each 3    Continuous Blood Gluc  (FREESTYLE DON 2 READER) Device Follow box instructions 1 Each 3    Multiple Vitamin (DAILY-JEANNA) Tab Take 1 Tablet by mouth every day.          **MEDICATION INSTRUCTIONS FOR SURGERY/PROCEDURE SCHEDULED FOR 10/10/2024   DO NOT TAKE 7 DAYS PRIOR TO SURGERY      glucosamine Sulfate 500 MG Cap Take 500 mg by mouth 3 times a day with meals. Once a day 1500 mg       **MEDICATION INSTRUCTIONS FOR SURGERY/PROCEDURE SCHEDULED FOR 10/10/2024   DO NOT TAKE 7 DAYS PRIOR TO SURGERY      aspirin (ASA) 81 MG Chew Tab chewable tablet Chew 81 mg every day.          **MEDICATION INSTRUCTIONS FOR SURGERY/PROCEDURE SCHEDULED FOR 10/10/2024   COORDINATE MEDICATION INSTRUCTIONS FROM PRESCRIBING PHYSICIAN.  PATIENT STATES LAST TAKEN ON 10/2/2024      insulin glargine 100 UNIT/ML Solution Pen-injector injection 15      amLODIPine (NORVASC) 5 MG Tab 90      omeprazole (PRILOSEC) 40 MG delayed-release capsule 90      naproxen (NAPROSYN) 250 MG Tab Take 250 mg by mouth 2 times a day with meals.      loratadine (CLARITIN) 10 MG Tab Take 10 mg by mouth every day.        **MEDICATION INSTRUCTIONS FOR SURGERY/PROCEDURE SCHEDULED FOR 10/10/2024   OK TO CONTINUE TAKING PRIOR TO  SURGERY AND DAY OF SURGERY      omeprazole (PRILOSEC) 40 MG delayed-release capsule Take 1 Capsule by mouth every day. 90 Capsule 3    tizanidine (ZANAFLEX) 4 MG Tab Take 0.5-1 Tablets by mouth at bedtime as needed (muscle spasms). 30 Tablet 2     No current facility-administered medications on file prior to visit.         Allergies:   No Known Allergies    Social Hx:   Social History     Socioeconomic History    Marital status:      Spouse name: Not on file    Number of children: Not on file    Years of education: Not on file    Highest education level: Bachelor's degree (e.g., BA, AB, BS)   Occupational History    Not on file   Tobacco Use    Smoking status: Never     Passive exposure: Never    Smokeless tobacco: Never   Vaping Use    Vaping status: Never Used   Substance and Sexual Activity    Alcohol use: Yes     Alcohol/week: 1.2 oz     Types: 2 Cans of beer per week     Comment: Occ. beer or wine    Drug use: Never    Sexual activity: Yes     Partners: Female   Other Topics Concern    Not on file   Social History Narrative    Not on file     Social Drivers of Health     Financial Resource Strain: Low Risk  (12/9/2023)    Overall Financial Resource Strain (CARDIA)     Difficulty of Paying Living Expenses: Not hard at all   Food Insecurity: No Food Insecurity (12/9/2023)    Hunger Vital Sign     Worried About Running Out of Food in the Last Year: Never true     Ran Out of Food in the Last Year: Never true   Transportation Needs: No Transportation Needs (12/9/2023)    PRAPARE - Transportation     Lack of Transportation (Medical): No     Lack of Transportation (Non-Medical): No   Physical Activity: Sufficiently Active (12/9/2023)    Exercise Vital Sign     Days of Exercise per Week: 7 days     Minutes of Exercise per Session: 50 min   Stress: No Stress Concern Present (12/9/2023)    [employer] - Occupational Stress Questionnaire     Feeling of Stress : Not at all   Social  Connections: Socially Integrated (12/9/2023)    Social Connection and Isolation Panel [NHANES]     Frequency of Communication with Friends and Family: More than three times a week     Frequency of Social Gatherings with Friends and Family: More than three times a week     Attends [employer] Services: 1 to 4 times per year     Active Member of Clubs or Organizations: Yes     Attends Club or Organization Meetings: More than 4 times per year     Marital Status:    Intimate Partner Violence: Not on file   Housing Stability: Low Risk  (12/9/2023)    Housing Stability Vital Sign     Unable to Pay for Housing in the Last Year: No     Number of Places Lived in the Last Year: 1     Unstable Housing in the Last Year: No         EXAMINATION     Physical Exam:   Vitals: BP (!) 144/72 (BP Location: Right arm, Patient Position: Sitting, BP Cuff Size: Adult)   Pulse (!) 54   Temp 36.8 °C (98.3 °F) (Temporal)   Ht 1.829 m (6')   Wt 74.8 kg (165 lb)   SpO2 96%     Constitutional:   Body Habitus: Body mass index is 22.38 kg/m².  Cooperation: Fully cooperates with exam  Appearance: Well-groomed, well-nourished.     Eyes: No scleral icterus to suggest severe liver disease, no proptosis to suggest severe hyperthyroidism     ENT -no obvious auditory deficits, no noticeable facial droop      Skin -no rashes or lesions noted      Respiratory-  breathing comfortably on room air, no audible wheezing     Cardiovascular-distal extremities warm and well perfused.  No lower extremity edema is noted.      Gastrointestinal - no obvious abdominal masses, non-distended     Psychiatric- alert and oriented ×3. Normal affect.      Gait - normal gait, no use of ambulatory device, nonantalgic.      Musculoskeletal and Neuro -      Thoracic/Lumbar Spine/Sacral Spine/Hips   Inspection: No evidence of atrophy in bilateral lower extremities throughout      Palpation:   Tenderness to palpation over left sacroiliac joint and tru sacroiliac area    "  Facet loading maneuver negative bilaterally    Lumbar spine /hip provocative exam maneuvers  Straight leg raise negative bilaterally  FADIR test negative bilaterally     SI joint tests  GRACIE test negative bilaterally        Key points for the international standards for neurological classification of spinal cord injury (ISNCSCI) to light touch.   Dermatome R L   L2 2 2   L3 2 2   L4 2 2   L5 2 2   S1 2 2   S2 2 2         Motor Exam Lower Extremities  ? Myotome R L   Hip flexion L2 5 5   Knee extension L3 5 5   Ankle dorsiflexion L4 5 5   Toe extension L5 5 5   Ankle plantarflexion S1 5 5      Previous exam  Reflexes  ?   R L   Patella   1+ 1+   Achilles    1+ 1+      Clonus of the ankle negative bilaterally                MEDICAL DECISION MAKING    DATA    Labs:   Lab Results   Component Value Date/Time    SODIUM 142 10/04/2024 11:21 AM    POTASSIUM 4.2 10/04/2024 11:21 AM    CHLORIDE 107 10/04/2024 11:21 AM    CO2 26 10/04/2024 11:21 AM    GLUCOSE 114 (H) 10/04/2024 11:21 AM    BUN 23 (H) 10/04/2024 11:21 AM    CREATININE 0.92 10/04/2024 11:21 AM        No results found for: \"PROTHROMBTM\", \"INR\"     Lab Results   Component Value Date/Time    WBC 6.7 02/29/2024 07:34 AM    RBC 4.95 02/29/2024 07:34 AM    HEMOGLOBIN 15.4 02/29/2024 07:34 AM    HEMATOCRIT 45.9 02/29/2024 07:34 AM    MCV 92.7 02/29/2024 07:34 AM    MCH 31.1 02/29/2024 07:34 AM    MCHC 33.6 02/29/2024 07:34 AM    MPV 11.1 02/29/2024 07:34 AM    NEUTSPOLYS 45.30 02/29/2024 07:34 AM    LYMPHOCYTES 40.50 02/29/2024 07:34 AM    MONOCYTES 9.50 02/29/2024 07:34 AM    EOSINOPHILS 4.20 02/29/2024 07:34 AM    BASOPHILS 0.40 02/29/2024 07:34 AM        Lab Results   Component Value Date/Time    HBA1C 7.0 (H) 04/14/2025 04:08 PM        Limited diagnostic ultrasound 2/11/2025 by Dr. Herron of the patient's left knee  Moderate joint effusion seen at suprapatellar recess. No obvious derangement of visualized meniscus at medial and lateral joint lines.  Images uploaded " for permanent storage.    EMG 3/24/2023  Impression:  This is a normal electrodiagnostic study.  There is no electrodiagnostic evidence of a generalized peripheral polyneuropathy, right median or ulnar mononeuropathy, sural mononeuropathy on either side, tibial mononeuropathy on either side, fibular mononeuropathy on either side, or lumbar plexopathy on either side.        Imaging:   I personally reviewed following images  MRI left knee 2/21/2025  Partial-thickness tear of MCL.  Medial meniscus tear.  Small joint effusion.  Mild ACL sprain. See formal radiology report for further details.      XR lumbar spine 6/7/23  Facet arthropathy of the bilateral lower lumbar levels.  Anterior body osteophyte at L4 and L5.  Disc space narrowing most notable at L3-4, L4-5, and L5-S1. See formal radiology report for further details.    XR hips 6/7/23  Moderate OA bilaterally. See formal radiology report for further details.     MRI lumbar spine 12/14/2024  At L1-2 there is mild right moderate left neuroforaminal stenosis.  At L2-3 there is mild to moderate central canal stenosis and bilateral neuroforaminal stenosis.  At L3-4 there is moderate central canal stenosis and moderate to severe bilateral neuroforaminal stenosis.  At L4-5 there is severe right and moderate left neuroforaminal stenosis and mild central canal stenosis.  At L5-S1 there is mild central canal stenosis and moderate to severe bilateral neuroforaminal stenosis.  Facet arthropathy at bilateral lower lumbar levels.  See formal radiology report for further details.    I reviewed the following radiology reports  MRI left knee 2/21/2025    FINDINGS:     Menisci: There is a horizontal type tear involving the posterior horn and body of the medial meniscus. The lateral meniscus is intact.     Tendons and Ligaments: There is some increased signal of the anterior cruciate ligament consistent with mild sprain. The posterior cruciate ligament is intact. There is high-grade  partial thickness tear of the femoral origin of the medial collateral   ligament. Fibular collateral ligament is intact.     Extensor Mechanism/Patella: The extensor mechanism is intact.   The articular cartilage is intact.     Osseous Structures/Cartilaginous surfaces: Intact. No evidence of marrow edema or fracture. There are small osteophytes involving the medial and lateral femorotibial articulations as well as the patellofemoral articulation.     Miscellaneous: There is a small joint effusion. There is no significant popliteal cyst.     IMPRESSION:     1.  High-grade partial-thickness tear of the femoral origin of the medial collateral ligament.     2.  Horizontal type tear of the posterior horn and body of the medial meniscus.     3.  Mild sprain of the anterior cruciate ligament.     4.  Small joint effusion.     5.  Mild degenerative change.     MRI lumbar spine 12/14/2024  FINDINGS: The lowest formed intervertebral disc will be designated L5-S1 for the purposes of this report and vertebral levels numbered accordingly.        The lumbar vertebral bodies have unremarkable height and no gross malalignment.  No acute or suspicious osseous lesion is seen.  There is loss of intervertebral disc height throughout the lumbar spine, mild at L1-L2 and moderate at the other lumbar levels.  There are prominent Modic type I/II degenerative endplate changes adjacent to the L2-L3 intervertebral disc with Modic type II degenerative endplate changes at L3-L4, L4-L5 and L5-S1.  There are small Schmorl nodes in the inferior endplate of L2, both endplates of L3 and the superior endplate of L4.  The conus medullaris has a normal caliber course and signal intensity.     Level specific findings as follows:     L1-2: Mild diffuse disc bulge. Mild RIGHT and moderate LEFT neural foraminal narrowing.  L2-3: Diffuse disc bulge. Mild to moderate BILATERAL facet arthropathy. Mild to moderate central canal narrowing and BILATERAL neural  foraminal narrowing.  L3-4: Diffuse disc bulge. Moderate BILATERAL facet arthropathy. Mild to moderate central canal narrowing. Moderate to severe BILATERAL neural foraminal narrowing.  L4-5: Diffuse disc bulge. Severe RIGHT and moderate to severe LEFT facet arthropathy. Severe RIGHT and moderate LEFT neural foraminal narrowing. Mild central canal narrowing.  L5-S1: Diffuse disc bulge which approximates and probably contacts the traversing BILATERAL S1 nerve roots. Moderate BILATERAL facet arthropathy. Mild central canal narrowing. Moderate to severe BILATERAL neural foraminal narrowing.     There are changes of BILATERAL sacroiliac arthropathy.     Soft tissues: No abdominal aortic aneurysm or soft tissue mass is seen.     IMPRESSION:     1.  Multilevel multifactorial degenerative changes  2.  No areas of high-grade central canal narrowing  3.  Areas of central canal and neural foraminal narrowing as described above  4.  Disc disease at L5-S1 probably contacts the BILATERAL traversing S1 nerve roots.    X-ray hip 6/22/2017  AP pelvis and left lateral hip films were ordered performed and   interpreted by us today and reveal moderate heart osteoarthritic changes   in both hips with femoral head flattening noticed bilaterally with the   right mi reviewed left and sclerotic changes at the superior acetabular   dome bilaterally and early spur formation superolaterally consistent with   osteoarthritis.                                 XR lumbar spine 6/7/23  FINDINGS:  Vertebral body height is well maintained.  There is no evidence of fracture.  Vertebral alignment is normal.  There is moderate to severe degenerative disc disease and facet arthropathy mainly at the L3-S1 levels.        IMPRESSION:     1.  No compression deformity or acute fracture is identified.     2.  There is significant degenerative disc disease and facet arthropathy throughout the lower lumbar spine.             XR hips 6/7/23  FINDINGS:  There is no  evidence of acute fracture involving the pelvis. No proximal femoral fracture is identified.  There is moderate joint space narrowing. A core sclerosis and marginal spurring. There is mild flattening of the right femoral head. There is some linear lucency within the right femoral head.     IMPRESSION:     1.  No radiographic evidence of acute traumatic injury.     2.  Findings are consistent with moderate osteoarthritis bilaterally.     3.  There is some flattening of the right femoral head as well as some lucency in the mid femoral head area. Avascular necrosis is a possibility. Prior right femoral head fracture is also possible.           DIAGNOSIS   Visit Diagnoses     ICD-10-CM   1. Myalgia  M79.10   2. Lumbar spondylosis  M47.816   3. Knee instability, left  M25.362   4. Chronic pain of left knee  M25.562    G89.29   5. Chronic bilateral low back pain without sciatica  M54.50    G89.29   6. Chronic pain of both hips  M25.551    M25.552    G89.29   7. Neuropathy  G62.9   8. Gluteal pain  M79.18   9. Tendinopathy of right gluteus medius  M67.951   10. Acute pain of left knee  M25.562   11. Acute bilateral low back pain without sciatica  M54.50                     ASSESSMENT and PLAN:     Sukumar Lunayudith  1955 male with low back pain and left knee pain      Darrius was seen today for follow-up.    Diagnoses and all orders for this visit:    Myalgia    Lumbar spondylosis    Knee instability, left    Chronic pain of left knee    Chronic bilateral low back pain without sciatica    Chronic pain of both hips    Neuropathy    Gluteal pain    Tendinopathy of right gluteus medius    Acute pain of left knee    Acute bilateral low back pain without sciatica              Physical Therapy: Discussed my recommendation to continue home exercise program for low back pain and left knee pain      Diagnostic workup:no new imaging needed at this time      Medications:   -Continue tizanidine and mobic PRN which he has  taken with some relief.    Interventions:    - S/p trigger point injections with no significant improvement  - radiofrequency ablation of lumbar medial branch nerves targeting Bilateral L4-L5 and L5-S1 facet joints as needed  -Discussed possible epidural if his sciatica pain returns and is severe enough to warrant an injection  - Despite Modic changes seen on his MRI, it does not seem that his low back pain is vertebrogenic at this time    Other  -Continue follow-up with orthopedics regarding left knee  - Discussed consideration of PRP for left knee meniscus tear and ACL sprain    Follow up: 6 months or sooner as needed    Haley Herron MD  Interventional Pain and Spine  Physical Medicine and Rehabilitation  [facility]

## 2025-07-08 DIAGNOSIS — K21.9 GASTROESOPHAGEAL REFLUX DISEASE WITHOUT ESOPHAGITIS: ICD-10-CM

## 2025-07-08 RX ORDER — OMEPRAZOLE 40 MG/1
40 CAPSULE, DELAYED RELEASE ORAL DAILY
Qty: 90 CAPSULE | Refills: 0 | Status: SHIPPED | OUTPATIENT
Start: 2025-07-08

## 2025-07-24 DIAGNOSIS — E10.9 TYPE 1 DIABETES MELLITUS WITHOUT COMPLICATION (HCC): ICD-10-CM

## 2025-07-24 DIAGNOSIS — Z76.89 ESTABLISHING CARE WITH NEW DOCTOR, ENCOUNTER FOR: ICD-10-CM

## 2025-07-24 DIAGNOSIS — E55.9 VITAMIN D DEFICIENCY: ICD-10-CM

## 2025-07-24 DIAGNOSIS — G89.29 CHRONIC RIGHT SHOULDER PAIN: ICD-10-CM

## 2025-07-24 DIAGNOSIS — M25.511 CHRONIC RIGHT SHOULDER PAIN: ICD-10-CM

## 2025-07-24 RX ORDER — INSULIN DEGLUDEC 100 U/ML
INJECTION, SOLUTION SUBCUTANEOUS
Qty: 15 ML | Refills: 0 | Status: SHIPPED | OUTPATIENT
Start: 2025-07-24

## 2025-07-24 NOTE — TELEPHONE ENCOUNTER
Received request via: Pharmacy    Was the patient seen in the last year in this department? Yes    Does the patient have an active prescription (recently filled or refills available) for medication(s) requested? No    Pharmacy Name:  [facility] [address] [place] [address], [place] - [id number]     Does the patient have senior living Plus and need 100-day supply? (This applies to ALL medications) Patient does not have SCP

## 2025-07-25 ENCOUNTER — HOSPITAL ENCOUNTER (OUTPATIENT)
Dept: LAB | Facility: MEDICAL CENTER | Age: 70
End: 2025-07-25
Attending: INTERNAL MEDICINE
Payer: MEDICARE

## 2025-07-25 DIAGNOSIS — E10.9 TYPE 1 DIABETES MELLITUS WITHOUT COMPLICATION (HCC): ICD-10-CM

## 2025-07-25 DIAGNOSIS — Z12.5 SCREENING FOR PROSTATE CANCER: ICD-10-CM

## 2025-07-25 DIAGNOSIS — E78.2 MIXED HYPERLIPIDEMIA: ICD-10-CM

## 2025-07-25 DIAGNOSIS — I10 ESSENTIAL HYPERTENSION: ICD-10-CM

## 2025-07-25 LAB
ALBUMIN SERPL BCP-MCNC: 4.1 G/DL (ref 3.2–4.9)
ALBUMIN/GLOB SERPL: 1.9 G/DL
ALP SERPL-CCNC: 70 U/L (ref 30–99)
ALT SERPL-CCNC: 31 U/L (ref 2–50)
ANION GAP SERPL CALC-SCNC: 8 MMOL/L (ref 7–16)
AST SERPL-CCNC: 28 U/L (ref 12–45)
BILIRUB SERPL-MCNC: 0.4 MG/DL (ref 0.1–1.5)
BUN SERPL-MCNC: 27 MG/DL (ref 8–22)
CALCIUM ALBUM COR SERPL-MCNC: 9 MG/DL (ref 8.5–10.5)
CALCIUM SERPL-MCNC: 9.1 MG/DL (ref 8.5–10.5)
CHLORIDE SERPL-SCNC: 108 MMOL/L (ref 96–112)
CHOLEST SERPL-MCNC: 127 MG/DL (ref 100–199)
CO2 SERPL-SCNC: 25 MMOL/L (ref 20–33)
CREAT SERPL-MCNC: 0.87 MG/DL (ref 0.5–1.4)
EST. AVERAGE GLUCOSE BLD GHB EST-MCNC: 143 MG/DL
GFR SERPLBLD CREATININE-BSD FMLA CKD-EPI: 94 ML/MIN/1.73 M 2
GLOBULIN SER CALC-MCNC: 2.2 G/DL (ref 1.9–3.5)
GLUCOSE SERPL-MCNC: 144 MG/DL (ref 65–99)
HBA1C MFR BLD: 6.6 % (ref 4–5.6)
HDLC SERPL-MCNC: 54 MG/DL
LDLC SERPL CALC-MCNC: 65 MG/DL
POTASSIUM SERPL-SCNC: 4.8 MMOL/L (ref 3.6–5.5)
PROT SERPL-MCNC: 6.3 G/DL (ref 6–8.2)
PSA SERPL DL<=0.01 NG/ML-MCNC: 2.08 NG/ML (ref 0–4)
SODIUM SERPL-SCNC: 141 MMOL/L (ref 135–145)
TRIGL SERPL-MCNC: 39 MG/DL (ref 0–149)

## 2025-07-25 PROCEDURE — 84153 ASSAY OF PSA TOTAL: CPT | Mod: GA

## 2025-07-25 PROCEDURE — 83036 HEMOGLOBIN GLYCOSYLATED A1C: CPT | Mod: GA

## 2025-07-25 PROCEDURE — 80061 LIPID PANEL: CPT

## 2025-07-25 PROCEDURE — 36415 COLL VENOUS BLD VENIPUNCTURE: CPT

## 2025-07-25 PROCEDURE — 80053 COMPREHEN METABOLIC PANEL: CPT

## 2025-07-28 ENCOUNTER — PATIENT MESSAGE (OUTPATIENT)
Dept: MEDICAL GROUP | Facility: LAB | Age: 70
End: 2025-07-28

## 2025-07-28 ENCOUNTER — OFFICE VISIT (OUTPATIENT)
Dept: MEDICAL GROUP | Facility: LAB | Age: 70
End: 2025-07-28
Payer: MEDICARE

## 2025-07-28 VITALS
HEIGHT: 72 IN | HEART RATE: 58 BPM | BODY MASS INDEX: 22.01 KG/M2 | TEMPERATURE: 97.9 F | DIASTOLIC BLOOD PRESSURE: 62 MMHG | WEIGHT: 162.48 LBS | SYSTOLIC BLOOD PRESSURE: 138 MMHG | RESPIRATION RATE: 16 BRPM | OXYGEN SATURATION: 96 %

## 2025-07-28 DIAGNOSIS — E10.9 TYPE 1 DIABETES MELLITUS WITHOUT COMPLICATION (HCC): ICD-10-CM

## 2025-07-28 DIAGNOSIS — I1A.0 RESISTANT HYPERTENSION: Primary | ICD-10-CM

## 2025-07-28 DIAGNOSIS — E10.40 TYPE 1 DIABETES MELLITUS WITH DIABETIC NEUROPATHY, UNSPECIFIED (HCC): ICD-10-CM

## 2025-07-28 PROCEDURE — 99213 OFFICE O/P EST LOW 20 MIN: CPT | Performed by: INTERNAL MEDICINE

## 2025-07-28 PROCEDURE — 3075F SYST BP GE 130 - 139MM HG: CPT | Performed by: INTERNAL MEDICINE

## 2025-07-28 PROCEDURE — 3078F DIAST BP <80 MM HG: CPT | Performed by: INTERNAL MEDICINE

## 2025-07-28 RX ORDER — INSULIN LISPRO 100 [IU]/ML
10 INJECTION, SOLUTION INTRAVENOUS; SUBCUTANEOUS
Qty: 10 EACH | Refills: 3 | Status: SHIPPED | OUTPATIENT
Start: 2025-07-28

## 2025-07-28 RX ORDER — HYDROCHLOROTHIAZIDE 12.5 MG/1
12.5 TABLET ORAL DAILY
Qty: 90 TABLET | Refills: 3 | Status: SHIPPED | OUTPATIENT
Start: 2025-07-28 | End: 2026-07-23

## 2025-07-28 RX ORDER — MELOXICAM 15 MG/1
15 TABLET ORAL
COMMUNITY

## 2025-07-28 ASSESSMENT — FIBROSIS 4 INDEX: FIB4 SCORE: 1.45

## 2025-07-28 NOTE — PROGRESS NOTES
CC: Sukumar Tyson is a 70 y.o. male is suffering from   Chief Complaint   Patient presents with    Medication Refill    Hypertension Follow-up         SUBJECTIVE:  1. Resistant hypertension  Darrius is here for follow-up, continues to have modestly elevated blood pressure we will add hydrochlorothiazide    2. Type 1 diabetes mellitus with diabetic neuropathy, unspecified ([facility])  History of type 1 diabetes with very good control    History of Present Illness              Past social, family, history: Social History[1] , health retired insurance    MEDICATIONS:  Current Medications[2]    PROBLEMS:  Patient Active Problem List    Diagnosis Date Noted    Type 1 diabetes mellitus with diabetic neuropathy, unspecified ([facility]) 07/24/2023    Laryngitis 04/10/2023    Neuropathy 01/23/2023    Gastroesophageal reflux disease without esophagitis 04/18/2022    Other chest pain 01/20/2022    Chronic low back pain without sciatica 12/01/2020    Type 1 diabetes mellitus without complication ([facility]) 06/01/2020    Mixed hyperlipidemia 06/01/2020    Essential hypertension 06/01/2020    Other male erectile dysfunction 06/01/2020       REVIEW OF SYSTEMS:  Gen.:  No Nausea, Vomiting, fever, Chills.  Heart: No chest pain.  Lungs:  No shortness of Breath.  Psychological: Regan unusual Anxiety depression     PHYSICAL EXAM   Physical Exam             Constitutional: Alert, cooperative, not in acute distress.  Cardiovascular:  Rate Rhythm is regular without murmurs rubs clicks.     Thorax & Lungs: Clear to auscultation, no wheezing, rhonchi, or rales  HENT: Normocephalic, Atraumatic.  Eyes: PERRLA, EOMI, Conjunctiva normal.   Neck: Trachia is midline no swelling of the thyroid.   Lymphatic: No lymphadenopathy noted.   Neurologic: Alert & oriented x 3, cranial nerves II through XII are intact, Normal motor function, Normal sensory function, No focal deficits noted.   Psychiatric: Affect normal, Judgment normal, Mood normal.     VITAL  SIGNS:/62 (BP Location: Left arm, Patient Position: Sitting, BP Cuff Size: Adult)   Pulse (!) 58   Temp 36.6 °C (97.9 °F) (Temporal)   Resp 16   Ht 1.829 m (6')   Wt 73.7 kg (162 lb 7.7 oz)   SpO2 96%   BMI 22.04 kg/m²     Labs: Reviewed    Assessment:                                                     Plan:  [unfilled]             1. Resistant hypertension (Primary)  Add hydrochlorothiazide  - hydroCHLOROthiazide 12.5 MG tablet; Take 1 Tablet by mouth every day for 360 days.  Dispense: 90 Tablet; Refill: 3  - Basic Metabolic Panel; Future  - HEMOGLOBIN A1C; Future    2. Type 1 diabetes mellitus with diabetic neuropathy, unspecified ([facility])  Recheck hemoglobin A1c in 3 months  Patient is currently using CGM               [1]   Social History  Tobacco Use    Smoking status: Never     Passive exposure: Never    Smokeless tobacco: Never   Vaping Use    Vaping status: Never Used   Substance Use Topics    Alcohol use: Yes     Alcohol/week: 1.2 oz     Types: 2 Cans of beer per week     Comment: Occ. beer or wine    Drug use: Never   [2]   Current Outpatient Medications:     meloxicam (MOBIC) 15 MG tablet, Take 15 mg by mouth 1 time a day as needed for Moderate Pain., Disp: , Rfl:     coenzyme Q-10 30 MG capsule, Take 60 mg by mouth every day., Disp: , Rfl:     hydroCHLOROthiazide 12.5 MG tablet, Take 1 Tablet by mouth every day for 360 days., Disp: 90 Tablet, Rfl: 3    insulin lispro 100 UNIT/ML SC SOPN injection PEN, Inject 10 Units under the skin 3 times a day before meals. **MEDICATION INSTRUCTIONS FOR SURGERY/PROCEDURE SCHEDULED FOR 10/10/2024 COORDINATE MEDICATION INSTRUCTIONS FROM PRESCRIBING PHYSICIAN, Disp: 10 Each, Rfl: 3    TRESIBA FLEXTOUCH 100 UNIT/ML Solution Pen-injector, INJECT 50 UNITS SUBCUTANEOUSLY ONCE DAILY, Disp: 15 mL, Rfl: 0    omeprazole (PRILOSEC) 40 MG delayed-release capsule, Take 1 capsule by mouth once daily, Disp: 90 Capsule, Rfl: 0    amLODIPine (NORVASC) 5 MG Tab, Take 1  tablet by mouth once daily, Disp: 90 Tablet, Rfl: 3    olmesartan (BENICAR) 40 MG Tab, Take 1 Tablet by mouth every day., Disp: 100 Tablet, Rfl: 3    tadalafil 20 MG tablet, Take 1 Tablet by mouth as needed for Erectile Dysfunction., Disp: 30 Tablet, Rfl: 3    insulin glargine 100 UNIT/ML Solution Pen-injector injection, 15, Disp: , Rfl:     amLODIPine (NORVASC) 5 MG Tab, 90, Disp: , Rfl:     aspirin 81 MG EC tablet, 0, Disp: , Rfl:     atorvastatin (LIPITOR) 10 MG Tab, 90, Disp: , Rfl:     insulin lispro 100 UNIT/ML SC SOPN injection PEN, 63, Disp: , Rfl:     Insulin Degludec (TRESIBA FLEXTOUCH) 100 UNIT/ML Solution Pen-injector, 15, Disp: , Rfl:     omeprazole (PRILOSEC) 40 MG delayed-release capsule, 90, Disp: , Rfl:     naproxen (NAPROSYN) 250 MG Tab, Take 250 mg by mouth 2 times a day with meals., Disp: , Rfl:     ibuprofen (MOTRIN) 200 MG Tab, Take 200 mg by mouth every 6 hours as needed., Disp: , Rfl:     atorvastatin (LIPITOR) 10 MG Tab, Take 1 Tablet by mouth every day., Disp: 90 Tablet, Rfl: 3    loratadine (CLARITIN) 10 MG Tab, Take 10 mg by mouth every day.     **MEDICATION INSTRUCTIONS FOR SURGERY/PROCEDURE SCHEDULED FOR 10/10/2024  OK TO CONTINUE TAKING PRIOR TO SURGERY AND DAY OF SURGERY, Disp: , Rfl:     COLLAGEN PO, Take  by mouth every day.       **MEDICATION INSTRUCTIONS FOR SURGERY/PROCEDURE SCHEDULED FOR 10/10/2024  DO NOT TAKE 7 DAYS PRIOR TO SURGERY, Disp: , Rfl:     latanoprost (XALATAN) 0.005 % Solution,      **MEDICATION INSTRUCTIONS FOR SURGERY/PROCEDURE SCHEDULED FOR 10/10/2024  OK TO CONTINUE TAKING PRIOR TO SURGERY AND NIGHT BEFORE SURGERY, Disp: , Rfl:     tizanidine (ZANAFLEX) 4 MG Tab, Take 0.5-1 Tablets by mouth at bedtime as needed (muscle spasms)., Disp: 30 Tablet, Rfl: 2    Continuous Blood Gluc Sensor (FREESTYLE DON 14 DAY SENSOR) Misc, 1 Each every 14 days., Disp: 6 Each, Rfl: 3    Glucagon (GVOKE HYPOPEN 1-PACK) 1 MG/0.2ML Solution Auto-injector, Inject 1 mg under the skin  one time as needed (hypoglycemia) for up to 1 dose., Disp: 0.2 mL, Rfl: 1    Insulin Pen Needle (PEN NEEDLES) 32G X 5 MM Misc, Use to inject insulin 4x daily subq, Disp: 100 Each, Rfl: 3    Continuous Blood Gluc  (FREESTYLE DON 2 READER) Device, Follow box instructions, Disp: 1 Each, Rfl: 3    Multiple Vitamin (DAILY-JEANNA) Tab, Take 1 Tablet by mouth every day.       **MEDICATION INSTRUCTIONS FOR SURGERY/PROCEDURE SCHEDULED FOR 10/10/2024  DO NOT TAKE 7 DAYS PRIOR TO SURGERY, Disp: , Rfl:     glucosamine Sulfate 500 MG Cap, Take 500 mg by mouth 3 times a day with meals. Once a day 1500 mg    **MEDICATION INSTRUCTIONS FOR SURGERY/PROCEDURE SCHEDULED FOR 10/10/2024  DO NOT TAKE 7 DAYS PRIOR TO SURGERY, Disp: , Rfl:     aspirin (ASA) 81 MG Chew Tab chewable tablet, Chew 81 mg every day.       **MEDICATION INSTRUCTIONS FOR SURGERY/PROCEDURE SCHEDULED FOR 10/10/2024  COORDINATE MEDICATION INSTRUCTIONS FROM PRESCRIBING PHYSICIAN. PATIENT STATES LAST TAKEN ON 10/2/2024, Disp: , Rfl:

## 2025-07-29 ENCOUNTER — TELEPHONE (OUTPATIENT)
Dept: MEDICAL GROUP | Facility: LAB | Age: 70
End: 2025-07-29
Payer: MEDICARE

## 2025-07-29 NOTE — TELEPHONE ENCOUNTER
"Received fax from [employer] [facility] [employer] requesting that chart notes have the phrase \"Patient is currently using CGM\" in order to cover his freestyle jesús   "

## 2025-07-30 NOTE — TELEPHONE ENCOUNTER
"Sonam:  Patient's office note from July 28, 2025 has been addended to include:    \"Patient is currently using CGM\"     Regards, Ash Condon, DO    "

## 2025-08-01 ENCOUNTER — PATIENT MESSAGE (OUTPATIENT)
Dept: MEDICAL GROUP | Facility: LAB | Age: 70
End: 2025-08-01
Payer: MEDICARE

## 2025-08-01 DIAGNOSIS — E10.9 TYPE 1 DIABETES MELLITUS WITHOUT COMPLICATION (HCC): ICD-10-CM

## 2025-08-01 RX ORDER — BLOOD SUGAR DIAGNOSTIC
STRIP MISCELLANEOUS
Qty: 100 EACH | Refills: 3 | Status: SHIPPED | OUTPATIENT
Start: 2025-08-01

## (undated) DEVICE — TUBE CONNECTING SUCTION - CLEAR PLASTIC STERILE 72 IN (50EA/CA)

## (undated) DEVICE — WATER IRRIGATION STERILE 1000ML (12EA/CA)

## (undated) DEVICE — INTRODUCER BOUGIE ADULT 15FX70CM (10EA/PK)

## (undated) DEVICE — GLOVE BIOGEL PI INDICATOR SZ 7.5 SURGICAL PF LF -(50/BX 4BX/CA)

## (undated) DEVICE — LACTATED RINGERS INJ 1000 ML - (14EA/CA 60CA/PF)

## (undated) DEVICE — ELECTRODE DUAL RETURN W/ CORD - (50/PK)

## (undated) DEVICE — SUCTION INSTRUMENT YANKAUER BULBOUS TIP W/O VENT (50EA/CA)

## (undated) DEVICE — KIT  I.V. START (100EA/CA)

## (undated) DEVICE — SENSOR OXIMETER ADULT SPO2 RD SET (20EA/BX)

## (undated) DEVICE — SLEEVE VASO DVT COMPRESSION CALF MED - (10PR/CA)

## (undated) DEVICE — TOWEL STOP TIMEOUT SAFETY FLAG (40EA/CA)

## (undated) DEVICE — CANISTER SUCTION RIGID RED 1500CC (40EA/CA)

## (undated) DEVICE — TUBING CLEARLINK DUO-VENT - C-FLO (48EA/CA)

## (undated) DEVICE — CANISTER SUCTION 3000ML MECHANICAL FILTER AUTO SHUTOFF MEDI-VAC NONSTERILE LF DISP (40EA/CA)

## (undated) DEVICE — GOWN WARMING STANDARD FLEX - (30/CA)

## (undated) DEVICE — CANNULA O2 COMFORT SOFT EAR ADULT 7 FT TUBING (50/CA)

## (undated) DEVICE — MASK OXYGEN VNYL ADLT MED CONC WITH 7 FOOT TUBING - (50EA/CA)

## (undated) DEVICE — TEETHGUARD ENT -2BX MIN ORDER- (6EA/BX)

## (undated) DEVICE — JELLY SURGILUBE STERILE TUBE 4.25 OZ (1/EA)

## (undated) DEVICE — SODIUM CHL IRRIGATION 0.9% 1000ML (12EA/CA)

## (undated) DEVICE — SET LEADWIRE 5 LEAD BEDSIDE DISPOSABLE ECG (1SET OF 5/EA)

## (undated) DEVICE — PACK ENT OR - (2EA/CA)

## (undated) DEVICE — Device